# Patient Record
Sex: FEMALE | Race: WHITE | NOT HISPANIC OR LATINO
[De-identification: names, ages, dates, MRNs, and addresses within clinical notes are randomized per-mention and may not be internally consistent; named-entity substitution may affect disease eponyms.]

---

## 2017-05-15 ENCOUNTER — APPOINTMENT (OUTPATIENT)
Dept: OBGYN | Facility: CLINIC | Age: 37
End: 2017-05-15

## 2017-05-15 VITALS
HEIGHT: 67 IN | WEIGHT: 172 LBS | DIASTOLIC BLOOD PRESSURE: 64 MMHG | HEART RATE: 73 BPM | BODY MASS INDEX: 27 KG/M2 | SYSTOLIC BLOOD PRESSURE: 118 MMHG

## 2017-05-15 DIAGNOSIS — Z80.51 FAMILY HISTORY OF MALIGNANT NEOPLASM OF KIDNEY: ICD-10-CM

## 2017-05-24 ENCOUNTER — TRANSCRIPTION ENCOUNTER (OUTPATIENT)
Age: 37
End: 2017-05-24

## 2017-06-19 ENCOUNTER — APPOINTMENT (OUTPATIENT)
Dept: INTERNAL MEDICINE | Facility: CLINIC | Age: 37
End: 2017-06-19

## 2017-06-19 VITALS
HEIGHT: 67 IN | BODY MASS INDEX: 26.68 KG/M2 | HEART RATE: 72 BPM | RESPIRATION RATE: 12 BRPM | SYSTOLIC BLOOD PRESSURE: 91 MMHG | OXYGEN SATURATION: 98 % | DIASTOLIC BLOOD PRESSURE: 60 MMHG | WEIGHT: 170 LBS

## 2017-06-24 ENCOUNTER — EMERGENCY (EMERGENCY)
Facility: HOSPITAL | Age: 37
LOS: 1 days | Discharge: HOME | End: 2017-06-24
Attending: EMERGENCY MEDICINE | Admitting: EMERGENCY MEDICINE
Payer: COMMERCIAL

## 2017-06-24 VITALS
HEIGHT: 67 IN | WEIGHT: 167.99 LBS | SYSTOLIC BLOOD PRESSURE: 136 MMHG | OXYGEN SATURATION: 97 % | HEART RATE: 74 BPM | TEMPERATURE: 98 F | RESPIRATION RATE: 17 BRPM | DIASTOLIC BLOOD PRESSURE: 84 MMHG

## 2017-06-24 VITALS
OXYGEN SATURATION: 98 % | RESPIRATION RATE: 16 BRPM | TEMPERATURE: 100 F | HEART RATE: 68 BPM | SYSTOLIC BLOOD PRESSURE: 108 MMHG | DIASTOLIC BLOOD PRESSURE: 68 MMHG

## 2017-06-24 DIAGNOSIS — M67.922 UNSPECIFIED DISORDER OF SYNOVIUM AND TENDON, LEFT UPPER ARM: Chronic | ICD-10-CM

## 2017-06-24 LAB
ALBUMIN SERPL ELPH-MCNC: 3.6 G/DL — SIGNIFICANT CHANGE UP (ref 3.3–5)
ALP SERPL-CCNC: 42 U/L — SIGNIFICANT CHANGE UP (ref 30–120)
ALT FLD-CCNC: 15 U/L DA — SIGNIFICANT CHANGE UP (ref 10–60)
ANION GAP SERPL CALC-SCNC: 6 MMOL/L — SIGNIFICANT CHANGE UP (ref 5–17)
AST SERPL-CCNC: 14 U/L — SIGNIFICANT CHANGE UP (ref 10–40)
BASOPHILS # BLD AUTO: 0.1 K/UL — SIGNIFICANT CHANGE UP (ref 0–0.2)
BASOPHILS NFR BLD AUTO: 1.1 % — SIGNIFICANT CHANGE UP (ref 0–2)
BILIRUB SERPL-MCNC: 0.3 MG/DL — SIGNIFICANT CHANGE UP (ref 0.2–1.2)
BUN SERPL-MCNC: 15 MG/DL — SIGNIFICANT CHANGE UP (ref 7–23)
CALCIUM SERPL-MCNC: 9.1 MG/DL — SIGNIFICANT CHANGE UP (ref 8.4–10.5)
CHLORIDE SERPL-SCNC: 105 MMOL/L — SIGNIFICANT CHANGE UP (ref 96–108)
CK SERPL-CCNC: 27 U/L — SIGNIFICANT CHANGE UP (ref 26–192)
CO2 SERPL-SCNC: 27 MMOL/L — SIGNIFICANT CHANGE UP (ref 22–31)
CREAT SERPL-MCNC: 0.58 MG/DL — SIGNIFICANT CHANGE UP (ref 0.5–1.3)
EOSINOPHIL # BLD AUTO: 0.1 K/UL — SIGNIFICANT CHANGE UP (ref 0–0.5)
EOSINOPHIL NFR BLD AUTO: 0.7 % — SIGNIFICANT CHANGE UP (ref 0–6)
GLUCOSE SERPL-MCNC: 93 MG/DL — SIGNIFICANT CHANGE UP (ref 70–99)
HCT VFR BLD CALC: 37.7 % — SIGNIFICANT CHANGE UP (ref 34.5–45)
HGB BLD-MCNC: 12.3 G/DL — SIGNIFICANT CHANGE UP (ref 11.5–15.5)
LYMPHOCYTES # BLD AUTO: 1.9 K/UL — SIGNIFICANT CHANGE UP (ref 1–3.3)
LYMPHOCYTES # BLD AUTO: 20.6 % — SIGNIFICANT CHANGE UP (ref 13–44)
MCHC RBC-ENTMCNC: 29.3 PG — SIGNIFICANT CHANGE UP (ref 27–34)
MCHC RBC-ENTMCNC: 32.5 GM/DL — SIGNIFICANT CHANGE UP (ref 32–36)
MCV RBC AUTO: 90 FL — SIGNIFICANT CHANGE UP (ref 80–100)
MONOCYTES # BLD AUTO: 0.5 K/UL — SIGNIFICANT CHANGE UP (ref 0–0.9)
MONOCYTES NFR BLD AUTO: 5.7 % — SIGNIFICANT CHANGE UP (ref 2–14)
NEUTROPHILS # BLD AUTO: 6.8 K/UL — SIGNIFICANT CHANGE UP (ref 1.8–7.4)
NEUTROPHILS NFR BLD AUTO: 72 % — SIGNIFICANT CHANGE UP (ref 43–77)
PLATELET # BLD AUTO: 242 K/UL — SIGNIFICANT CHANGE UP (ref 150–400)
POTASSIUM SERPL-MCNC: 3.9 MMOL/L — SIGNIFICANT CHANGE UP (ref 3.5–5.3)
POTASSIUM SERPL-SCNC: 3.9 MMOL/L — SIGNIFICANT CHANGE UP (ref 3.5–5.3)
PROT SERPL-MCNC: 8 G/DL — SIGNIFICANT CHANGE UP (ref 6–8.3)
RBC # BLD: 4.19 M/UL — SIGNIFICANT CHANGE UP (ref 3.8–5.2)
RBC # FLD: 11.8 % — SIGNIFICANT CHANGE UP (ref 10.3–14.5)
SODIUM SERPL-SCNC: 138 MMOL/L — SIGNIFICANT CHANGE UP (ref 135–145)
TROPONIN I SERPL-MCNC: 0 NG/ML — LOW (ref 0.02–0.06)
WBC # BLD: 9.4 K/UL — SIGNIFICANT CHANGE UP (ref 3.8–10.5)
WBC # FLD AUTO: 9.4 K/UL — SIGNIFICANT CHANGE UP (ref 3.8–10.5)

## 2017-06-24 PROCEDURE — 93010 ELECTROCARDIOGRAM REPORT: CPT

## 2017-06-24 PROCEDURE — 99284 EMERGENCY DEPT VISIT MOD MDM: CPT | Mod: 25

## 2017-06-24 PROCEDURE — 36415 COLL VENOUS BLD VENIPUNCTURE: CPT

## 2017-06-24 PROCEDURE — 93005 ELECTROCARDIOGRAM TRACING: CPT

## 2017-06-24 PROCEDURE — 84484 ASSAY OF TROPONIN QUANT: CPT

## 2017-06-24 PROCEDURE — 85027 COMPLETE CBC AUTOMATED: CPT

## 2017-06-24 PROCEDURE — 99284 EMERGENCY DEPT VISIT MOD MDM: CPT

## 2017-06-24 PROCEDURE — 82550 ASSAY OF CK (CPK): CPT

## 2017-06-24 PROCEDURE — 80053 COMPREHEN METABOLIC PANEL: CPT

## 2017-06-24 RX ORDER — MECLIZINE HCL 12.5 MG
1 TABLET ORAL
Qty: 15 | Refills: 0 | OUTPATIENT
Start: 2017-06-24 | End: 2017-06-29

## 2017-06-24 RX ORDER — SODIUM CHLORIDE 9 MG/ML
3 INJECTION INTRAMUSCULAR; INTRAVENOUS; SUBCUTANEOUS ONCE
Qty: 0 | Refills: 0 | Status: COMPLETED | OUTPATIENT
Start: 2017-06-24 | End: 2017-06-24

## 2017-06-24 RX ORDER — MECLIZINE HCL 12.5 MG
25 TABLET ORAL ONCE
Qty: 0 | Refills: 0 | Status: COMPLETED | OUTPATIENT
Start: 2017-06-24 | End: 2017-06-24

## 2017-06-24 RX ORDER — SODIUM CHLORIDE 9 MG/ML
1000 INJECTION INTRAMUSCULAR; INTRAVENOUS; SUBCUTANEOUS ONCE
Qty: 0 | Refills: 0 | Status: COMPLETED | OUTPATIENT
Start: 2017-06-24 | End: 2017-06-24

## 2017-06-24 RX ADMIN — SODIUM CHLORIDE 3 MILLILITER(S): 9 INJECTION INTRAMUSCULAR; INTRAVENOUS; SUBCUTANEOUS at 14:28

## 2017-06-24 RX ADMIN — Medication 25 MILLIGRAM(S): at 14:29

## 2017-06-24 RX ADMIN — SODIUM CHLORIDE 250 MILLILITER(S): 9 INJECTION INTRAMUSCULAR; INTRAVENOUS; SUBCUTANEOUS at 14:29

## 2017-06-24 NOTE — ED PROVIDER NOTE - OBJECTIVE STATEMENT
35 y/o F presents to the ED c/o "heart beating hard" and dizziness today. Pt states that she was at work when she suddenly felt her heart "beating harder than usual" and experienced dizziness. Pt tried to rest, thinking that the symptoms would resolve on their own. However, when she sat up, the sx worsened. Pt also notes that before she arrived to the ED, she experienced tingling in her fingertips and her lips. Pt notes that a couple of years ago, she experienced a similar episode. She notes that today's episode lasted a little longer in length than the previous episode years ago.  Pt denies CP or HA. Nonsmoker. Social ETOH usage. Allergic to penicillin, sulfur and Zithromax. PSHx: ablation for SVT (8 years ago)

## 2017-06-24 NOTE — ED ADULT NURSE NOTE - CHPI ED SYMPTOMS NEG
no tingling/no pain/no vomiting/no chills/no numbness/no fever/no decreased eating/drinking/no nausea

## 2017-06-24 NOTE — ED PROVIDER NOTE - MEDICAL DECISION MAKING DETAILS
Will evaluate the etiology of pt's sudden onset of dizziness and racing heart and correlate with physical exam, lab tests and imaging studies. Will treat accordingly.

## 2017-06-24 NOTE — ED ADULT NURSE NOTE - OBJECTIVE STATEMENT
Pt states while at work she was in a hot environment felt headace and felt she was going to pass out. States she "got herself to a good place" with no LOC. Alert oriented x 3 Skin warm and dry to touch. Color good JOSE CM RSR with no ectopics noted

## 2018-01-08 ENCOUNTER — ASOB RESULT (OUTPATIENT)
Age: 38
End: 2018-01-08

## 2018-01-08 ENCOUNTER — APPOINTMENT (OUTPATIENT)
Dept: OBGYN | Facility: CLINIC | Age: 38
End: 2018-01-08
Payer: COMMERCIAL

## 2018-01-08 VITALS
BODY MASS INDEX: 27.15 KG/M2 | DIASTOLIC BLOOD PRESSURE: 78 MMHG | WEIGHT: 173 LBS | HEIGHT: 67 IN | HEART RATE: 66 BPM | SYSTOLIC BLOOD PRESSURE: 127 MMHG

## 2018-01-08 DIAGNOSIS — Z78.9 OTHER SPECIFIED HEALTH STATUS: ICD-10-CM

## 2018-01-08 DIAGNOSIS — Z31.69 ENCOUNTER FOR OTHER GENERAL COUNSELING AND ADVICE ON PROCREATION: ICD-10-CM

## 2018-01-08 DIAGNOSIS — A09 INFECTIOUS GASTROENTERITIS AND COLITIS, UNSPECIFIED: ICD-10-CM

## 2018-01-08 DIAGNOSIS — Z30.41 ENCOUNTER FOR SURVEILLANCE OF CONTRACEPTIVE PILLS: ICD-10-CM

## 2018-01-08 PROCEDURE — 76817 TRANSVAGINAL US OBSTETRIC: CPT

## 2018-01-08 PROCEDURE — 99214 OFFICE O/P EST MOD 30 MIN: CPT

## 2018-01-08 RX ORDER — DESOGESTREL/ETHINYL ESTRADIOL AND ETHINYL ESTRADIOL 21-5 (28)
0.15-0.02/0.01 KIT ORAL
Qty: 28 | Refills: 6 | Status: COMPLETED | COMMUNITY
Start: 2017-05-15 | End: 2018-01-08

## 2018-01-22 ENCOUNTER — ASOB RESULT (OUTPATIENT)
Age: 38
End: 2018-01-22

## 2018-01-22 ENCOUNTER — TRANSCRIPTION ENCOUNTER (OUTPATIENT)
Age: 38
End: 2018-01-22

## 2018-01-22 ENCOUNTER — APPOINTMENT (OUTPATIENT)
Dept: OBGYN | Facility: CLINIC | Age: 38
End: 2018-01-22
Payer: COMMERCIAL

## 2018-01-22 PROCEDURE — 76817 TRANSVAGINAL US OBSTETRIC: CPT

## 2018-02-04 PROBLEM — Z78.9 NON-SMOKER: Status: ACTIVE | Noted: 2018-02-04

## 2018-02-04 PROBLEM — Z31.69 PRE-CONCEPTION COUNSELING: Status: RESOLVED | Noted: 2017-05-15 | Resolved: 2018-02-04

## 2018-02-05 ENCOUNTER — APPOINTMENT (OUTPATIENT)
Dept: OBGYN | Facility: CLINIC | Age: 38
End: 2018-02-05
Payer: COMMERCIAL

## 2018-02-05 ENCOUNTER — NON-APPOINTMENT (OUTPATIENT)
Age: 38
End: 2018-02-05

## 2018-02-05 ENCOUNTER — RESULT REVIEW (OUTPATIENT)
Age: 38
End: 2018-02-05

## 2018-02-05 ENCOUNTER — TRANSCRIPTION ENCOUNTER (OUTPATIENT)
Age: 38
End: 2018-02-05

## 2018-02-05 VITALS
DIASTOLIC BLOOD PRESSURE: 70 MMHG | WEIGHT: 175 LBS | BODY MASS INDEX: 27.47 KG/M2 | HEIGHT: 67 IN | SYSTOLIC BLOOD PRESSURE: 119 MMHG

## 2018-02-05 LAB
ABO + RH PNL BLD: NORMAL
BACTERIA UR CULT: NORMAL
BASOPHILS # BLD AUTO: 0.04 K/UL
BASOPHILS NFR BLD AUTO: 0.3 %
BLD GP AB SCN SERPL QL: NORMAL
C TRACH RRNA SPEC QL NAA+PROBE: NOT DETECTED
CYTOLOGY CVX/VAG DOC THIN PREP: NORMAL
EOSINOPHIL # BLD AUTO: 0.08 K/UL
EOSINOPHIL NFR BLD AUTO: 0.6 %
HBV SURFACE AG SER QL: NONREACTIVE
HCT VFR BLD CALC: 37.4 %
HCV AB SER QL: NONREACTIVE
HCV S/CO RATIO: 0.15 S/CO
HGB BLD-MCNC: 12.5 G/DL
HIV1+2 AB SPEC QL IA.RAPID: NONREACTIVE
HPV HIGH+LOW RISK DNA PNL CVX: NOT DETECTED
IMM GRANULOCYTES NFR BLD AUTO: 0.2 %
LYMPHOCYTES # BLD AUTO: 1.75 K/UL
LYMPHOCYTES NFR BLD AUTO: 13 %
MAN DIFF?: NORMAL
MCHC RBC-ENTMCNC: 29.4 PG
MCHC RBC-ENTMCNC: 33.4 GM/DL
MCV RBC AUTO: 88 FL
MONOCYTES # BLD AUTO: 0.79 K/UL
MONOCYTES NFR BLD AUTO: 5.9 %
N GONORRHOEA RRNA SPEC QL NAA+PROBE: NOT DETECTED
NEUTROPHILS # BLD AUTO: 10.76 K/UL
NEUTROPHILS NFR BLD AUTO: 80 %
PLATELET # BLD AUTO: 301 K/UL
RBC # BLD: 4.25 M/UL
RBC # FLD: 12.9 %
RUBV IGG FLD-ACNC: 0.7 INDEX
RUBV IGG SER-IMP: NEGATIVE
SOURCE AMPLIFICATION: NORMAL
T GONDII AB SER-IMP: NEGATIVE
T GONDII AB SER-IMP: NEGATIVE
T GONDII IGG SER QL: <3 IU/ML
T GONDII IGM SER QL: <3 AU/ML
VZV AB TITR SER: NEGATIVE
VZV IGG SER IF-ACNC: 97.9 INDEX
WBC # FLD AUTO: 13.45 K/UL

## 2018-02-05 PROCEDURE — 0501F PRENATAL FLOW SHEET: CPT

## 2018-02-06 LAB
B19V IGG SER QL IA: 6.7 INDEX
B19V IGG+IGM SER-IMP: NORMAL
B19V IGG+IGM SER-IMP: POSITIVE
B19V IGM FLD-ACNC: 0.2 INDEX
B19V IGM SER-ACNC: NEGATIVE
BACTERIA UR CULT: NORMAL
LEAD BLD-MCNC: <1 UG/DL
T PALLIDUM AB SER QL IA: NEGATIVE

## 2018-02-08 ENCOUNTER — OTHER (OUTPATIENT)
Age: 38
End: 2018-02-08

## 2018-02-09 LAB
FMR1 GENE MUT ANL BLD/T: NORMAL
HGB A MFR BLD: 97.3 %
HGB A2 MFR BLD: 2.7 %
HGB FRACT BLD-IMP: NORMAL

## 2018-02-21 ENCOUNTER — ASOB RESULT (OUTPATIENT)
Age: 38
End: 2018-02-21

## 2018-02-21 ENCOUNTER — LABORATORY RESULT (OUTPATIENT)
Age: 38
End: 2018-02-21

## 2018-02-21 ENCOUNTER — APPOINTMENT (OUTPATIENT)
Dept: ANTEPARTUM | Facility: CLINIC | Age: 38
End: 2018-02-21
Payer: COMMERCIAL

## 2018-02-21 LAB — CFTR MUT TESTED BLD/T: NORMAL

## 2018-02-21 PROCEDURE — 76813 OB US NUCHAL MEAS 1 GEST: CPT

## 2018-02-21 PROCEDURE — 76801 OB US < 14 WKS SINGLE FETUS: CPT

## 2018-02-21 PROCEDURE — 36416 COLLJ CAPILLARY BLOOD SPEC: CPT

## 2018-03-12 ENCOUNTER — NON-APPOINTMENT (OUTPATIENT)
Age: 38
End: 2018-03-12

## 2018-03-12 ENCOUNTER — APPOINTMENT (OUTPATIENT)
Dept: OBGYN | Facility: CLINIC | Age: 38
End: 2018-03-12
Payer: COMMERCIAL

## 2018-03-12 VITALS
WEIGHT: 175.31 LBS | HEIGHT: 67 IN | SYSTOLIC BLOOD PRESSURE: 117 MMHG | BODY MASS INDEX: 27.52 KG/M2 | DIASTOLIC BLOOD PRESSURE: 72 MMHG

## 2018-03-12 PROCEDURE — 0502F SUBSEQUENT PRENATAL CARE: CPT

## 2018-04-09 ENCOUNTER — NON-APPOINTMENT (OUTPATIENT)
Age: 38
End: 2018-04-09

## 2018-04-09 ENCOUNTER — LABORATORY RESULT (OUTPATIENT)
Age: 38
End: 2018-04-09

## 2018-04-09 ENCOUNTER — APPOINTMENT (OUTPATIENT)
Dept: OBGYN | Facility: CLINIC | Age: 38
End: 2018-04-09
Payer: COMMERCIAL

## 2018-04-09 VITALS
SYSTOLIC BLOOD PRESSURE: 124 MMHG | WEIGHT: 180 LBS | DIASTOLIC BLOOD PRESSURE: 74 MMHG | BODY MASS INDEX: 28.25 KG/M2 | HEIGHT: 67 IN

## 2018-04-09 PROCEDURE — 0502F SUBSEQUENT PRENATAL CARE: CPT

## 2018-04-16 ENCOUNTER — ASOB RESULT (OUTPATIENT)
Age: 38
End: 2018-04-16

## 2018-04-16 ENCOUNTER — APPOINTMENT (OUTPATIENT)
Dept: ANTEPARTUM | Facility: CLINIC | Age: 38
End: 2018-04-16
Payer: COMMERCIAL

## 2018-04-16 PROCEDURE — 76811 OB US DETAILED SNGL FETUS: CPT

## 2018-05-06 ENCOUNTER — OUTPATIENT (OUTPATIENT)
Dept: OUTPATIENT SERVICES | Facility: HOSPITAL | Age: 38
LOS: 1 days | End: 2018-05-06
Payer: COMMERCIAL

## 2018-05-06 DIAGNOSIS — O26.899 OTHER SPECIFIED PREGNANCY RELATED CONDITIONS, UNSPECIFIED TRIMESTER: ICD-10-CM

## 2018-05-06 DIAGNOSIS — Z3A.00 WEEKS OF GESTATION OF PREGNANCY NOT SPECIFIED: ICD-10-CM

## 2018-05-06 DIAGNOSIS — M67.922 UNSPECIFIED DISORDER OF SYNOVIUM AND TENDON, LEFT UPPER ARM: Chronic | ICD-10-CM

## 2018-05-06 LAB
ALBUMIN SERPL ELPH-MCNC: 3.3 G/DL — SIGNIFICANT CHANGE UP (ref 3.3–5)
ALP SERPL-CCNC: 46 U/L — SIGNIFICANT CHANGE UP (ref 40–120)
ALT FLD-CCNC: 25 U/L — SIGNIFICANT CHANGE UP (ref 4–33)
APPEARANCE UR: CLEAR — SIGNIFICANT CHANGE UP
AST SERPL-CCNC: 18 U/L — SIGNIFICANT CHANGE UP (ref 4–32)
BASOPHILS # BLD AUTO: 0.02 K/UL — SIGNIFICANT CHANGE UP (ref 0–0.2)
BASOPHILS NFR BLD AUTO: 0.2 % — SIGNIFICANT CHANGE UP (ref 0–2)
BILIRUB SERPL-MCNC: 0.3 MG/DL — SIGNIFICANT CHANGE UP (ref 0.2–1.2)
BILIRUB UR-MCNC: NEGATIVE — SIGNIFICANT CHANGE UP
BLOOD UR QL VISUAL: NEGATIVE — SIGNIFICANT CHANGE UP
BUN SERPL-MCNC: 7 MG/DL — SIGNIFICANT CHANGE UP (ref 7–23)
CALCIUM SERPL-MCNC: 8.1 MG/DL — LOW (ref 8.4–10.5)
CHLORIDE SERPL-SCNC: 101 MMOL/L — SIGNIFICANT CHANGE UP (ref 98–107)
CO2 SERPL-SCNC: 18 MMOL/L — LOW (ref 22–31)
COLOR SPEC: YELLOW — SIGNIFICANT CHANGE UP
CREAT SERPL-MCNC: 0.51 MG/DL — SIGNIFICANT CHANGE UP (ref 0.5–1.3)
EOSINOPHIL # BLD AUTO: 0.01 K/UL — SIGNIFICANT CHANGE UP (ref 0–0.5)
EOSINOPHIL NFR BLD AUTO: 0.1 % — SIGNIFICANT CHANGE UP (ref 0–6)
GLUCOSE SERPL-MCNC: 108 MG/DL — HIGH (ref 70–99)
GLUCOSE UR-MCNC: NEGATIVE — SIGNIFICANT CHANGE UP
HCT VFR BLD CALC: 33.1 % — LOW (ref 34.5–45)
HGB BLD-MCNC: 10.9 G/DL — LOW (ref 11.5–15.5)
IMM GRANULOCYTES # BLD AUTO: 0.03 # — SIGNIFICANT CHANGE UP
IMM GRANULOCYTES NFR BLD AUTO: 0.3 % — SIGNIFICANT CHANGE UP (ref 0–1.5)
KETONES UR-MCNC: NEGATIVE — SIGNIFICANT CHANGE UP
LEUKOCYTE ESTERASE UR-ACNC: NEGATIVE — SIGNIFICANT CHANGE UP
LYMPHOCYTES # BLD AUTO: 0.48 K/UL — LOW (ref 1–3.3)
LYMPHOCYTES # BLD AUTO: 4.2 % — LOW (ref 13–44)
MCHC RBC-ENTMCNC: 30.1 PG — SIGNIFICANT CHANGE UP (ref 27–34)
MCHC RBC-ENTMCNC: 32.9 % — SIGNIFICANT CHANGE UP (ref 32–36)
MCV RBC AUTO: 91.4 FL — SIGNIFICANT CHANGE UP (ref 80–100)
MONOCYTES # BLD AUTO: 0.29 K/UL — SIGNIFICANT CHANGE UP (ref 0–0.9)
MONOCYTES NFR BLD AUTO: 2.5 % — SIGNIFICANT CHANGE UP (ref 2–14)
MUCOUS THREADS # UR AUTO: SIGNIFICANT CHANGE UP
NEUTROPHILS # BLD AUTO: 10.72 K/UL — HIGH (ref 1.8–7.4)
NEUTROPHILS NFR BLD AUTO: 92.7 % — HIGH (ref 43–77)
NITRITE UR-MCNC: NEGATIVE — SIGNIFICANT CHANGE UP
NON-SQ EPI CELLS # UR AUTO: <1 — SIGNIFICANT CHANGE UP
NRBC # FLD: 0 — SIGNIFICANT CHANGE UP
PH UR: 6 — SIGNIFICANT CHANGE UP (ref 4.6–8)
PLATELET # BLD AUTO: 187 K/UL — SIGNIFICANT CHANGE UP (ref 150–400)
PMV BLD: 11.3 FL — SIGNIFICANT CHANGE UP (ref 7–13)
POTASSIUM SERPL-MCNC: 3.7 MMOL/L — SIGNIFICANT CHANGE UP (ref 3.5–5.3)
POTASSIUM SERPL-SCNC: 3.7 MMOL/L — SIGNIFICANT CHANGE UP (ref 3.5–5.3)
PROT SERPL-MCNC: 6.6 G/DL — SIGNIFICANT CHANGE UP (ref 6–8.3)
PROT UR-MCNC: 10 MG/DL — SIGNIFICANT CHANGE UP
RBC # BLD: 3.62 M/UL — LOW (ref 3.8–5.2)
RBC # FLD: 13.4 % — SIGNIFICANT CHANGE UP (ref 10.3–14.5)
RBC CASTS # UR COMP ASSIST: SIGNIFICANT CHANGE UP (ref 0–?)
SODIUM SERPL-SCNC: 133 MMOL/L — LOW (ref 135–145)
SP GR SPEC: 1.02 — SIGNIFICANT CHANGE UP (ref 1–1.04)
SQUAMOUS # UR AUTO: SIGNIFICANT CHANGE UP
UROBILINOGEN FLD QL: NORMAL MG/DL — SIGNIFICANT CHANGE UP
WBC # BLD: 11.55 K/UL — HIGH (ref 3.8–10.5)
WBC # FLD AUTO: 11.55 K/UL — HIGH (ref 3.8–10.5)
WBC UR QL: SIGNIFICANT CHANGE UP (ref 0–?)

## 2018-05-06 PROCEDURE — 76815 OB US LIMITED FETUS(S): CPT | Mod: 26

## 2018-05-06 PROCEDURE — 76817 TRANSVAGINAL US OBSTETRIC: CPT | Mod: 26

## 2018-05-06 PROCEDURE — 99202 OFFICE O/P NEW SF 15 MIN: CPT | Mod: 25

## 2018-05-06 RX ORDER — SODIUM CHLORIDE 9 MG/ML
1000 INJECTION, SOLUTION INTRAVENOUS ONCE
Qty: 0 | Refills: 0 | Status: COMPLETED | OUTPATIENT
Start: 2018-05-06 | End: 2018-05-06

## 2018-05-06 RX ADMIN — SODIUM CHLORIDE 2000 MILLILITER(S): 9 INJECTION, SOLUTION INTRAVENOUS at 16:20

## 2018-05-07 ENCOUNTER — NON-APPOINTMENT (OUTPATIENT)
Age: 38
End: 2018-05-07

## 2018-05-07 ENCOUNTER — APPOINTMENT (OUTPATIENT)
Dept: OBGYN | Facility: CLINIC | Age: 38
End: 2018-05-07
Payer: COMMERCIAL

## 2018-05-07 VITALS
SYSTOLIC BLOOD PRESSURE: 107 MMHG | HEIGHT: 67 IN | DIASTOLIC BLOOD PRESSURE: 72 MMHG | WEIGHT: 186.25 LBS | BODY MASS INDEX: 29.23 KG/M2

## 2018-05-07 VITALS — DIASTOLIC BLOOD PRESSURE: 71 MMHG | SYSTOLIC BLOOD PRESSURE: 118 MMHG

## 2018-05-07 VITALS — DIASTOLIC BLOOD PRESSURE: 73 MMHG | SYSTOLIC BLOOD PRESSURE: 68 MMHG

## 2018-05-07 VITALS — DIASTOLIC BLOOD PRESSURE: 66 MMHG | SYSTOLIC BLOOD PRESSURE: 98 MMHG

## 2018-05-07 VITALS — TEMPERATURE: 98.1 F

## 2018-05-07 PROCEDURE — 0502F SUBSEQUENT PRENATAL CARE: CPT

## 2018-05-08 LAB — SPECIMEN SOURCE: SIGNIFICANT CHANGE UP

## 2018-05-09 LAB
-  AMIKACIN: SIGNIFICANT CHANGE UP
-  AMPICILLIN/SULBACTAM: SIGNIFICANT CHANGE UP
-  AMPICILLIN: SIGNIFICANT CHANGE UP
-  AZTREONAM: SIGNIFICANT CHANGE UP
-  CEFAZOLIN: SIGNIFICANT CHANGE UP
-  CEFEPIME: SIGNIFICANT CHANGE UP
-  CEFOXITIN: SIGNIFICANT CHANGE UP
-  CEFTAZIDIME: SIGNIFICANT CHANGE UP
-  CEFTRIAXONE: SIGNIFICANT CHANGE UP
-  CIPROFLOXACIN: SIGNIFICANT CHANGE UP
-  ERTAPENEM: SIGNIFICANT CHANGE UP
-  GENTAMICIN: SIGNIFICANT CHANGE UP
-  IMIPENEM: SIGNIFICANT CHANGE UP
-  LEVOFLOXACIN: SIGNIFICANT CHANGE UP
-  MEROPENEM: SIGNIFICANT CHANGE UP
-  NITROFURANTOIN: SIGNIFICANT CHANGE UP
-  PIPERACILLIN/TAZOBACTAM: SIGNIFICANT CHANGE UP
-  TIGECYCLINE: SIGNIFICANT CHANGE UP
-  TOBRAMYCIN: SIGNIFICANT CHANGE UP
-  TRIMETHOPRIM/SULFAMETHOXAZOLE: SIGNIFICANT CHANGE UP
BACTERIA UR CULT: SIGNIFICANT CHANGE UP
METHOD TYPE: SIGNIFICANT CHANGE UP
ORGANISM # SPEC MICROSCOPIC CNT: SIGNIFICANT CHANGE UP
ORGANISM # SPEC MICROSCOPIC CNT: SIGNIFICANT CHANGE UP

## 2018-05-14 ENCOUNTER — OTHER (OUTPATIENT)
Age: 38
End: 2018-05-14

## 2018-05-19 ENCOUNTER — OUTPATIENT (OUTPATIENT)
Dept: OUTPATIENT SERVICES | Facility: HOSPITAL | Age: 38
LOS: 1 days | End: 2018-05-19

## 2018-05-19 DIAGNOSIS — O26.899 OTHER SPECIFIED PREGNANCY RELATED CONDITIONS, UNSPECIFIED TRIMESTER: ICD-10-CM

## 2018-05-19 DIAGNOSIS — M67.922 UNSPECIFIED DISORDER OF SYNOVIUM AND TENDON, LEFT UPPER ARM: Chronic | ICD-10-CM

## 2018-05-19 DIAGNOSIS — Z3A.00 WEEKS OF GESTATION OF PREGNANCY NOT SPECIFIED: ICD-10-CM

## 2018-06-01 ENCOUNTER — RECORD ABSTRACTING (OUTPATIENT)
Age: 38
End: 2018-06-01

## 2018-06-04 ENCOUNTER — APPOINTMENT (OUTPATIENT)
Dept: OBGYN | Facility: CLINIC | Age: 38
End: 2018-06-04
Payer: COMMERCIAL

## 2018-06-04 VITALS
SYSTOLIC BLOOD PRESSURE: 112 MMHG | DIASTOLIC BLOOD PRESSURE: 76 MMHG | WEIGHT: 191 LBS | HEIGHT: 67 IN | BODY MASS INDEX: 29.98 KG/M2

## 2018-06-04 PROCEDURE — 76815 OB US LIMITED FETUS(S): CPT

## 2018-06-04 PROCEDURE — 0502F SUBSEQUENT PRENATAL CARE: CPT

## 2018-06-05 ENCOUNTER — NON-APPOINTMENT (OUTPATIENT)
Age: 38
End: 2018-06-05

## 2018-06-05 LAB
ABO + RH PNL BLD: NORMAL
BASOPHILS # BLD AUTO: 0.02 K/UL
BASOPHILS NFR BLD AUTO: 0.2 %
BLD GP AB SCN SERPL QL: NORMAL
EOSINOPHIL # BLD AUTO: 0.09 K/UL
EOSINOPHIL NFR BLD AUTO: 0.9 %
GLUCOSE 1H P 50 G GLC PO SERPL-MCNC: 134 MG/DL
HCT VFR BLD CALC: 31 %
HGB BLD-MCNC: 10.4 G/DL
IMM GRANULOCYTES NFR BLD AUTO: 0.3 %
LYMPHOCYTES # BLD AUTO: 1.42 K/UL
LYMPHOCYTES NFR BLD AUTO: 14.2 %
MAN DIFF?: NORMAL
MCHC RBC-ENTMCNC: 30.1 PG
MCHC RBC-ENTMCNC: 33.5 GM/DL
MCV RBC AUTO: 89.9 FL
MONOCYTES # BLD AUTO: 0.39 K/UL
MONOCYTES NFR BLD AUTO: 3.9 %
NEUTROPHILS # BLD AUTO: 8.07 K/UL
NEUTROPHILS NFR BLD AUTO: 80.5 %
PLATELET # BLD AUTO: 175 K/UL
RBC # BLD: 3.45 M/UL
RBC # FLD: 13.9 %
WBC # FLD AUTO: 10.02 K/UL

## 2018-06-08 ENCOUNTER — OTHER (OUTPATIENT)
Age: 38
End: 2018-06-08

## 2018-06-25 ENCOUNTER — NON-APPOINTMENT (OUTPATIENT)
Age: 38
End: 2018-06-25

## 2018-06-25 ENCOUNTER — APPOINTMENT (OUTPATIENT)
Dept: OBGYN | Facility: CLINIC | Age: 38
End: 2018-06-25
Payer: COMMERCIAL

## 2018-06-25 VITALS
DIASTOLIC BLOOD PRESSURE: 81 MMHG | SYSTOLIC BLOOD PRESSURE: 128 MMHG | BODY MASS INDEX: 30.29 KG/M2 | HEIGHT: 67 IN | WEIGHT: 193 LBS

## 2018-06-25 VITALS — HEIGHT: 67 IN

## 2018-06-25 PROCEDURE — 90715 TDAP VACCINE 7 YRS/> IM: CPT

## 2018-06-25 PROCEDURE — 90471 IMMUNIZATION ADMIN: CPT

## 2018-06-25 PROCEDURE — 0502F SUBSEQUENT PRENATAL CARE: CPT

## 2018-07-11 ENCOUNTER — APPOINTMENT (OUTPATIENT)
Dept: OBGYN | Facility: CLINIC | Age: 38
End: 2018-07-11
Payer: COMMERCIAL

## 2018-07-11 ENCOUNTER — ASOB RESULT (OUTPATIENT)
Age: 38
End: 2018-07-11

## 2018-07-11 ENCOUNTER — APPOINTMENT (OUTPATIENT)
Dept: ANTEPARTUM | Facility: CLINIC | Age: 38
End: 2018-07-11
Payer: COMMERCIAL

## 2018-07-11 ENCOUNTER — NON-APPOINTMENT (OUTPATIENT)
Age: 38
End: 2018-07-11

## 2018-07-11 VITALS
SYSTOLIC BLOOD PRESSURE: 116 MMHG | DIASTOLIC BLOOD PRESSURE: 74 MMHG | HEIGHT: 67 IN | WEIGHT: 194 LBS | BODY MASS INDEX: 30.45 KG/M2

## 2018-07-11 PROCEDURE — 0502F SUBSEQUENT PRENATAL CARE: CPT

## 2018-07-11 PROCEDURE — 76816 OB US FOLLOW-UP PER FETUS: CPT

## 2018-07-11 PROCEDURE — 76819 FETAL BIOPHYS PROFIL W/O NST: CPT

## 2018-07-23 ENCOUNTER — APPOINTMENT (OUTPATIENT)
Dept: OBGYN | Facility: CLINIC | Age: 38
End: 2018-07-23
Payer: COMMERCIAL

## 2018-07-23 ENCOUNTER — NON-APPOINTMENT (OUTPATIENT)
Age: 38
End: 2018-07-23

## 2018-07-23 VITALS
DIASTOLIC BLOOD PRESSURE: 82 MMHG | WEIGHT: 199 LBS | SYSTOLIC BLOOD PRESSURE: 126 MMHG | HEIGHT: 67 IN | BODY MASS INDEX: 31.23 KG/M2

## 2018-07-23 PROCEDURE — 0502F SUBSEQUENT PRENATAL CARE: CPT

## 2018-08-06 ENCOUNTER — NON-APPOINTMENT (OUTPATIENT)
Age: 38
End: 2018-08-06

## 2018-08-06 ENCOUNTER — APPOINTMENT (OUTPATIENT)
Dept: OBGYN | Facility: CLINIC | Age: 38
End: 2018-08-06
Payer: COMMERCIAL

## 2018-08-06 VITALS
HEIGHT: 67 IN | SYSTOLIC BLOOD PRESSURE: 119 MMHG | DIASTOLIC BLOOD PRESSURE: 80 MMHG | WEIGHT: 198 LBS | BODY MASS INDEX: 31.08 KG/M2

## 2018-08-06 PROCEDURE — 0502F SUBSEQUENT PRENATAL CARE: CPT

## 2018-08-08 LAB
GP B STREP DNA SPEC QL NAA+PROBE: NORMAL
GP B STREP DNA SPEC QL NAA+PROBE: NOT DETECTED
SOURCE GBS: NORMAL

## 2018-08-13 ENCOUNTER — APPOINTMENT (OUTPATIENT)
Dept: OBGYN | Facility: CLINIC | Age: 38
End: 2018-08-13
Payer: COMMERCIAL

## 2018-08-13 VITALS
DIASTOLIC BLOOD PRESSURE: 74 MMHG | WEIGHT: 201.06 LBS | BODY MASS INDEX: 31.56 KG/M2 | SYSTOLIC BLOOD PRESSURE: 115 MMHG | HEIGHT: 67 IN

## 2018-08-13 PROCEDURE — 0502F SUBSEQUENT PRENATAL CARE: CPT

## 2018-08-20 ENCOUNTER — APPOINTMENT (OUTPATIENT)
Dept: OBGYN | Facility: CLINIC | Age: 38
End: 2018-08-20

## 2018-08-20 VITALS
BODY MASS INDEX: 31.55 KG/M2 | WEIGHT: 201 LBS | DIASTOLIC BLOOD PRESSURE: 93 MMHG | HEIGHT: 67 IN | SYSTOLIC BLOOD PRESSURE: 133 MMHG

## 2018-08-20 VITALS — SYSTOLIC BLOOD PRESSURE: 108 MMHG | DIASTOLIC BLOOD PRESSURE: 75 MMHG

## 2018-08-27 ENCOUNTER — NON-APPOINTMENT (OUTPATIENT)
Age: 38
End: 2018-08-27

## 2018-08-27 ENCOUNTER — APPOINTMENT (OUTPATIENT)
Dept: OBGYN | Facility: CLINIC | Age: 38
End: 2018-08-27
Payer: COMMERCIAL

## 2018-08-27 VITALS
SYSTOLIC BLOOD PRESSURE: 118 MMHG | DIASTOLIC BLOOD PRESSURE: 75 MMHG | HEIGHT: 67 IN | BODY MASS INDEX: 31.52 KG/M2 | WEIGHT: 200.8 LBS

## 2018-08-27 PROCEDURE — 0502F SUBSEQUENT PRENATAL CARE: CPT

## 2018-08-29 PROBLEM — R55 SYNCOPE AND COLLAPSE: Chronic | Status: ACTIVE | Noted: 2017-06-24

## 2018-08-29 PROBLEM — I49.9 CARDIAC ARRHYTHMIA, UNSPECIFIED: Chronic | Status: ACTIVE | Noted: 2017-06-24

## 2018-09-04 ENCOUNTER — ASOB RESULT (OUTPATIENT)
Age: 38
End: 2018-09-04

## 2018-09-04 ENCOUNTER — APPOINTMENT (OUTPATIENT)
Dept: ANTEPARTUM | Facility: HOSPITAL | Age: 38
End: 2018-09-04

## 2018-09-04 ENCOUNTER — APPOINTMENT (OUTPATIENT)
Dept: ANTEPARTUM | Facility: CLINIC | Age: 38
End: 2018-09-04
Payer: COMMERCIAL

## 2018-09-04 PROCEDURE — 76816 OB US FOLLOW-UP PER FETUS: CPT

## 2018-09-04 PROCEDURE — 76818 FETAL BIOPHYS PROFILE W/NST: CPT | Mod: 26

## 2018-09-06 ENCOUNTER — APPOINTMENT (OUTPATIENT)
Dept: OBGYN | Facility: CLINIC | Age: 38
End: 2018-09-06
Payer: COMMERCIAL

## 2018-09-06 ENCOUNTER — ASOB RESULT (OUTPATIENT)
Age: 38
End: 2018-09-06

## 2018-09-06 ENCOUNTER — APPOINTMENT (OUTPATIENT)
Dept: ANTEPARTUM | Facility: HOSPITAL | Age: 38
End: 2018-09-06
Payer: COMMERCIAL

## 2018-09-06 VITALS
SYSTOLIC BLOOD PRESSURE: 120 MMHG | HEIGHT: 67 IN | BODY MASS INDEX: 32.05 KG/M2 | WEIGHT: 204.19 LBS | DIASTOLIC BLOOD PRESSURE: 78 MMHG

## 2018-09-06 PROCEDURE — 0502F SUBSEQUENT PRENATAL CARE: CPT

## 2018-09-06 PROCEDURE — 59025 FETAL NON-STRESS TEST: CPT | Mod: 26

## 2018-09-08 ENCOUNTER — NON-APPOINTMENT (OUTPATIENT)
Age: 38
End: 2018-09-08

## 2018-09-08 ENCOUNTER — INPATIENT (INPATIENT)
Facility: HOSPITAL | Age: 38
LOS: 9 days | Discharge: HOME | End: 2018-09-18
Attending: OBSTETRICS & GYNECOLOGY | Admitting: OBSTETRICS & GYNECOLOGY
Payer: COMMERCIAL

## 2018-09-08 VITALS — HEIGHT: 67 IN | WEIGHT: 202.83 LBS

## 2018-09-08 DIAGNOSIS — Z3A.00 WEEKS OF GESTATION OF PREGNANCY NOT SPECIFIED: ICD-10-CM

## 2018-09-08 DIAGNOSIS — O26.899 OTHER SPECIFIED PREGNANCY RELATED CONDITIONS, UNSPECIFIED TRIMESTER: ICD-10-CM

## 2018-09-08 DIAGNOSIS — M67.922 UNSPECIFIED DISORDER OF SYNOVIUM AND TENDON, LEFT UPPER ARM: Chronic | ICD-10-CM

## 2018-09-08 LAB
BASOPHILS # BLD AUTO: 0.04 K/UL — SIGNIFICANT CHANGE UP (ref 0–0.2)
BASOPHILS NFR BLD AUTO: 0.3 % — SIGNIFICANT CHANGE UP (ref 0–2)
BLD GP AB SCN SERPL QL: NEGATIVE — SIGNIFICANT CHANGE UP
EOSINOPHIL # BLD AUTO: 0.06 K/UL — SIGNIFICANT CHANGE UP (ref 0–0.5)
EOSINOPHIL NFR BLD AUTO: 0.5 % — SIGNIFICANT CHANGE UP (ref 0–6)
HCT VFR BLD CALC: 36.7 % — SIGNIFICANT CHANGE UP (ref 34.5–45)
HGB BLD-MCNC: 11.9 G/DL — SIGNIFICANT CHANGE UP (ref 11.5–15.5)
IMM GRANULOCYTES # BLD AUTO: 0.06 # — SIGNIFICANT CHANGE UP
IMM GRANULOCYTES NFR BLD AUTO: 0.5 % — SIGNIFICANT CHANGE UP (ref 0–1.5)
LYMPHOCYTES # BLD AUTO: 18.6 % — SIGNIFICANT CHANGE UP (ref 13–44)
LYMPHOCYTES # BLD AUTO: 2.23 K/UL — SIGNIFICANT CHANGE UP (ref 1–3.3)
MCHC RBC-ENTMCNC: 27.7 PG — SIGNIFICANT CHANGE UP (ref 27–34)
MCHC RBC-ENTMCNC: 32.4 % — SIGNIFICANT CHANGE UP (ref 32–36)
MCV RBC AUTO: 85.5 FL — SIGNIFICANT CHANGE UP (ref 80–100)
MONOCYTES # BLD AUTO: 0.67 K/UL — SIGNIFICANT CHANGE UP (ref 0–0.9)
MONOCYTES NFR BLD AUTO: 5.6 % — SIGNIFICANT CHANGE UP (ref 2–14)
NEUTROPHILS # BLD AUTO: 8.9 K/UL — HIGH (ref 1.8–7.4)
NEUTROPHILS NFR BLD AUTO: 74.5 % — SIGNIFICANT CHANGE UP (ref 43–77)
NRBC # FLD: 0 — SIGNIFICANT CHANGE UP
PLATELET # BLD AUTO: 233 K/UL — SIGNIFICANT CHANGE UP (ref 150–400)
PMV BLD: 11.6 FL — SIGNIFICANT CHANGE UP (ref 7–13)
RBC # BLD: 4.29 M/UL — SIGNIFICANT CHANGE UP (ref 3.8–5.2)
RBC # FLD: 13.7 % — SIGNIFICANT CHANGE UP (ref 10.3–14.5)
RH IG SCN BLD-IMP: NEGATIVE — SIGNIFICANT CHANGE UP
RH IG SCN BLD-IMP: NEGATIVE — SIGNIFICANT CHANGE UP
WBC # BLD: 11.96 K/UL — HIGH (ref 3.8–10.5)
WBC # FLD AUTO: 11.96 K/UL — HIGH (ref 3.8–10.5)

## 2018-09-08 RX ORDER — OXYTOCIN 10 UNIT/ML
333.33 VIAL (ML) INJECTION
Qty: 20 | Refills: 0 | Status: DISCONTINUED | OUTPATIENT
Start: 2018-09-08 | End: 2018-09-08

## 2018-09-08 RX ORDER — SODIUM CHLORIDE 9 MG/ML
1000 INJECTION, SOLUTION INTRAVENOUS ONCE
Qty: 0 | Refills: 0 | Status: DISCONTINUED | OUTPATIENT
Start: 2018-09-08 | End: 2018-09-08

## 2018-09-08 RX ORDER — CETIRIZINE HYDROCHLORIDE 10 MG/1
1 TABLET ORAL
Qty: 0 | Refills: 0 | COMMUNITY

## 2018-09-08 RX ORDER — SODIUM CHLORIDE 9 MG/ML
1000 INJECTION, SOLUTION INTRAVENOUS
Qty: 0 | Refills: 0 | Status: DISCONTINUED | OUTPATIENT
Start: 2018-09-08 | End: 2018-09-08

## 2018-09-08 RX ORDER — INFLUENZA VIRUS VACCINE 15; 15; 15; 15 UG/.5ML; UG/.5ML; UG/.5ML; UG/.5ML
0.5 SUSPENSION INTRAMUSCULAR ONCE
Qty: 0 | Refills: 0 | Status: DISCONTINUED | OUTPATIENT
Start: 2018-09-08 | End: 2018-09-18

## 2018-09-08 NOTE — PATIENT PROFILE OB - ALERT: PERTINENT HISTORY
1st Trimester Sonogram/Fetal Non-Stress Test (NST)/20 Week Level II Sonogram/Ultra Screen at 12 Weeks

## 2018-09-09 ENCOUNTER — TRANSCRIPTION ENCOUNTER (OUTPATIENT)
Age: 38
End: 2018-09-09

## 2018-09-09 DIAGNOSIS — O41.00X0 OLIGOHYDRAMNIOS, UNSPECIFIED TRIMESTER, NOT APPLICABLE OR UNSPECIFIED: ICD-10-CM

## 2018-09-09 LAB
GP B STREP DNA SPEC QL NAA+PROBE: NORMAL
GP B STREP DNA SPEC QL NAA+PROBE: NOT DETECTED
SOURCE GBS: NORMAL
T PALLIDUM AB TITR SER: NEGATIVE — SIGNIFICANT CHANGE UP

## 2018-09-09 RX ORDER — ONDANSETRON 8 MG/1
4 TABLET, FILM COATED ORAL ONCE
Qty: 0 | Refills: 0 | Status: COMPLETED | OUTPATIENT
Start: 2018-09-09 | End: 2018-09-09

## 2018-09-09 RX ORDER — SODIUM CHLORIDE 9 MG/ML
1000 INJECTION, SOLUTION INTRAVENOUS ONCE
Qty: 0 | Refills: 0 | Status: COMPLETED | OUTPATIENT
Start: 2018-09-09 | End: 2018-09-09

## 2018-09-09 RX ORDER — SODIUM CHLORIDE 9 MG/ML
1000 INJECTION, SOLUTION INTRAVENOUS
Qty: 0 | Refills: 0 | Status: DISCONTINUED | OUTPATIENT
Start: 2018-09-09 | End: 2018-09-10

## 2018-09-09 RX ORDER — OXYTOCIN 10 UNIT/ML
333.33 VIAL (ML) INJECTION
Qty: 20 | Refills: 0 | Status: DISCONTINUED | OUTPATIENT
Start: 2018-09-09 | End: 2018-09-10

## 2018-09-09 RX ORDER — CITRIC ACID/SODIUM CITRATE 300-500 MG
15 SOLUTION, ORAL ORAL EVERY 4 HOURS
Qty: 0 | Refills: 0 | Status: DISCONTINUED | OUTPATIENT
Start: 2018-09-09 | End: 2018-09-10

## 2018-09-09 RX ADMIN — ONDANSETRON 4 MILLIGRAM(S): 8 TABLET, FILM COATED ORAL at 13:42

## 2018-09-09 RX ADMIN — SODIUM CHLORIDE 2000 MILLILITER(S): 9 INJECTION, SOLUTION INTRAVENOUS at 19:00

## 2018-09-10 ENCOUNTER — APPOINTMENT (OUTPATIENT)
Dept: ANTEPARTUM | Facility: CLINIC | Age: 38
End: 2018-09-10

## 2018-09-10 ENCOUNTER — TRANSCRIPTION ENCOUNTER (OUTPATIENT)
Age: 38
End: 2018-09-10

## 2018-09-10 ENCOUNTER — APPOINTMENT (OUTPATIENT)
Dept: ANTEPARTUM | Facility: HOSPITAL | Age: 38
End: 2018-09-10

## 2018-09-10 LAB
ALBUMIN SERPL ELPH-MCNC: 2.7 G/DL — LOW (ref 3.3–5)
ALP SERPL-CCNC: 109 U/L — SIGNIFICANT CHANGE UP (ref 40–120)
ALT FLD-CCNC: 8 U/L — SIGNIFICANT CHANGE UP (ref 4–33)
APPEARANCE UR: CLEAR — SIGNIFICANT CHANGE UP
APTT BLD: 26.8 SEC — LOW (ref 27.5–37.4)
AST SERPL-CCNC: 19 U/L — SIGNIFICANT CHANGE UP (ref 4–32)
BACTERIA # UR AUTO: NEGATIVE — SIGNIFICANT CHANGE UP
BASOPHILS # BLD AUTO: 0.06 K/UL — SIGNIFICANT CHANGE UP (ref 0–0.2)
BASOPHILS NFR BLD AUTO: 0.5 % — SIGNIFICANT CHANGE UP (ref 0–2)
BILIRUB SERPL-MCNC: 0.6 MG/DL — SIGNIFICANT CHANGE UP (ref 0.2–1.2)
BILIRUB UR-MCNC: NEGATIVE — SIGNIFICANT CHANGE UP
BLD GP AB SCN SERPL QL: NEGATIVE — SIGNIFICANT CHANGE UP
BLOOD UR QL VISUAL: HIGH
BUN SERPL-MCNC: 7 MG/DL — SIGNIFICANT CHANGE UP (ref 7–23)
CALCIUM SERPL-MCNC: 8.5 MG/DL — SIGNIFICANT CHANGE UP (ref 8.4–10.5)
CHLORIDE SERPL-SCNC: 105 MMOL/L — SIGNIFICANT CHANGE UP (ref 98–107)
CO2 SERPL-SCNC: 19 MMOL/L — LOW (ref 22–31)
COLOR SPEC: SIGNIFICANT CHANGE UP
CREAT ?TM UR-MCNC: 85.1 MG/DL — SIGNIFICANT CHANGE UP
CREAT SERPL-MCNC: 0.66 MG/DL — SIGNIFICANT CHANGE UP (ref 0.5–1.3)
EOSINOPHIL # BLD AUTO: 0.02 K/UL — SIGNIFICANT CHANGE UP (ref 0–0.5)
EOSINOPHIL NFR BLD AUTO: 0.2 % — SIGNIFICANT CHANGE UP (ref 0–6)
FIBRINOGEN PPP-MCNC: 792 MG/DL — HIGH (ref 310–510)
GLUCOSE SERPL-MCNC: 66 MG/DL — LOW (ref 70–99)
GLUCOSE UR-MCNC: NEGATIVE — SIGNIFICANT CHANGE UP
HCT VFR BLD CALC: 32.8 % — LOW (ref 34.5–45)
HGB BLD-MCNC: 10.8 G/DL — LOW (ref 11.5–15.5)
HYALINE CASTS # UR AUTO: NEGATIVE — SIGNIFICANT CHANGE UP
IMM GRANULOCYTES # BLD AUTO: 0.05 # — SIGNIFICANT CHANGE UP
IMM GRANULOCYTES NFR BLD AUTO: 0.4 % — SIGNIFICANT CHANGE UP (ref 0–1.5)
INR BLD: 0.98 — SIGNIFICANT CHANGE UP (ref 0.88–1.17)
KETONES UR-MCNC: SIGNIFICANT CHANGE UP
LDH SERPL L TO P-CCNC: 169 U/L — SIGNIFICANT CHANGE UP (ref 135–225)
LEUKOCYTE ESTERASE UR-ACNC: NEGATIVE — SIGNIFICANT CHANGE UP
LYMPHOCYTES # BLD AUTO: 1.94 K/UL — SIGNIFICANT CHANGE UP (ref 1–3.3)
LYMPHOCYTES # BLD AUTO: 16.4 % — SIGNIFICANT CHANGE UP (ref 13–44)
MCHC RBC-ENTMCNC: 28.4 PG — SIGNIFICANT CHANGE UP (ref 27–34)
MCHC RBC-ENTMCNC: 32.9 % — SIGNIFICANT CHANGE UP (ref 32–36)
MCV RBC AUTO: 86.3 FL — SIGNIFICANT CHANGE UP (ref 80–100)
MONOCYTES # BLD AUTO: 0.74 K/UL — SIGNIFICANT CHANGE UP (ref 0–0.9)
MONOCYTES NFR BLD AUTO: 6.3 % — SIGNIFICANT CHANGE UP (ref 2–14)
NEUTROPHILS # BLD AUTO: 9.01 K/UL — HIGH (ref 1.8–7.4)
NEUTROPHILS NFR BLD AUTO: 76.2 % — SIGNIFICANT CHANGE UP (ref 43–77)
NITRITE UR-MCNC: NEGATIVE — SIGNIFICANT CHANGE UP
NRBC # FLD: 0 — SIGNIFICANT CHANGE UP
PH UR: 6.5 — SIGNIFICANT CHANGE UP (ref 5–8)
PLATELET # BLD AUTO: 191 K/UL — SIGNIFICANT CHANGE UP (ref 150–400)
PMV BLD: 12 FL — SIGNIFICANT CHANGE UP (ref 7–13)
POTASSIUM SERPL-MCNC: 4 MMOL/L — SIGNIFICANT CHANGE UP (ref 3.5–5.3)
POTASSIUM SERPL-SCNC: 4 MMOL/L — SIGNIFICANT CHANGE UP (ref 3.5–5.3)
PROT SERPL-MCNC: 6.2 G/DL — SIGNIFICANT CHANGE UP (ref 6–8.3)
PROT UR-MCNC: 10 — SIGNIFICANT CHANGE UP
PROT UR-MCNC: 15.7 MG/DL — SIGNIFICANT CHANGE UP
PROTHROM AB SERPL-ACNC: 10.9 SEC — SIGNIFICANT CHANGE UP (ref 9.8–13.1)
RBC # BLD: 3.8 M/UL — SIGNIFICANT CHANGE UP (ref 3.8–5.2)
RBC # FLD: 13.9 % — SIGNIFICANT CHANGE UP (ref 10.3–14.5)
RBC CASTS # UR COMP ASSIST: SIGNIFICANT CHANGE UP (ref 0–?)
RH IG SCN BLD-IMP: NEGATIVE — SIGNIFICANT CHANGE UP
SODIUM SERPL-SCNC: 134 MMOL/L — LOW (ref 135–145)
SP GR SPEC: 1.01 — SIGNIFICANT CHANGE UP (ref 1–1.04)
SQUAMOUS # UR AUTO: SIGNIFICANT CHANGE UP
URATE SERPL-MCNC: 4.9 MG/DL — SIGNIFICANT CHANGE UP (ref 2.5–7)
UROBILINOGEN FLD QL: NORMAL — SIGNIFICANT CHANGE UP
WBC # BLD: 11.82 K/UL — HIGH (ref 3.8–10.5)
WBC # FLD AUTO: 11.82 K/UL — HIGH (ref 3.8–10.5)
WBC UR QL: SIGNIFICANT CHANGE UP (ref 0–?)

## 2018-09-10 PROCEDURE — 59510 CESAREAN DELIVERY: CPT | Mod: U9,UB,GC

## 2018-09-10 RX ORDER — OXYTOCIN 10 UNIT/ML
333.33 VIAL (ML) INJECTION
Qty: 20 | Refills: 0 | Status: DISCONTINUED | OUTPATIENT
Start: 2018-09-10 | End: 2018-09-11

## 2018-09-10 RX ORDER — DOCUSATE SODIUM 100 MG
100 CAPSULE ORAL
Qty: 0 | Refills: 0 | Status: DISCONTINUED | OUTPATIENT
Start: 2018-09-13 | End: 2018-09-18

## 2018-09-10 RX ORDER — FERROUS SULFATE 325(65) MG
325 TABLET ORAL DAILY
Qty: 0 | Refills: 0 | Status: DISCONTINUED | OUTPATIENT
Start: 2018-09-13 | End: 2018-09-16

## 2018-09-10 RX ORDER — OXYCODONE HYDROCHLORIDE 5 MG/1
5 TABLET ORAL
Qty: 0 | Refills: 0 | Status: COMPLETED | OUTPATIENT
Start: 2018-09-10 | End: 2018-09-17

## 2018-09-10 RX ORDER — OXYTOCIN 10 UNIT/ML
41.67 VIAL (ML) INJECTION
Qty: 20 | Refills: 0 | Status: DISCONTINUED | OUTPATIENT
Start: 2018-09-10 | End: 2018-09-11

## 2018-09-10 RX ORDER — TETANUS TOXOID, REDUCED DIPHTHERIA TOXOID AND ACELLULAR PERTUSSIS VACCINE, ADSORBED 5; 2.5; 8; 8; 2.5 [IU]/.5ML; [IU]/.5ML; UG/.5ML; UG/.5ML; UG/.5ML
0.5 SUSPENSION INTRAMUSCULAR ONCE
Qty: 0 | Refills: 0 | Status: DISCONTINUED | OUTPATIENT
Start: 2018-09-13 | End: 2018-09-18

## 2018-09-10 RX ORDER — SODIUM CHLORIDE 9 MG/ML
500 INJECTION, SOLUTION INTRAVENOUS ONCE
Qty: 0 | Refills: 0 | Status: COMPLETED | OUTPATIENT
Start: 2018-09-10 | End: 2018-09-10

## 2018-09-10 RX ORDER — SODIUM CHLORIDE 9 MG/ML
1000 INJECTION, SOLUTION INTRAVENOUS
Qty: 0 | Refills: 0 | Status: DISCONTINUED | OUTPATIENT
Start: 2018-09-10 | End: 2018-09-11

## 2018-09-10 RX ORDER — SODIUM CHLORIDE 9 MG/ML
1000 INJECTION, SOLUTION INTRAVENOUS
Qty: 0 | Refills: 0 | Status: DISCONTINUED | OUTPATIENT
Start: 2018-09-11 | End: 2018-09-14

## 2018-09-10 RX ORDER — OXYCODONE HYDROCHLORIDE 5 MG/1
10 TABLET ORAL
Qty: 0 | Refills: 0 | Status: DISCONTINUED | OUTPATIENT
Start: 2018-09-13 | End: 2018-09-18

## 2018-09-10 RX ORDER — NALOXONE HYDROCHLORIDE 4 MG/.1ML
0.1 SPRAY NASAL
Qty: 0 | Refills: 0 | Status: DISCONTINUED | OUTPATIENT
Start: 2018-09-13 | End: 2018-09-18

## 2018-09-10 RX ORDER — GLYCERIN ADULT
1 SUPPOSITORY, RECTAL RECTAL AT BEDTIME
Qty: 0 | Refills: 0 | Status: DISCONTINUED | OUTPATIENT
Start: 2018-09-13 | End: 2018-09-18

## 2018-09-10 RX ORDER — KETOROLAC TROMETHAMINE 30 MG/ML
30 SYRINGE (ML) INJECTION EVERY 6 HOURS
Qty: 0 | Refills: 0 | Status: DISCONTINUED | OUTPATIENT
Start: 2018-09-10 | End: 2018-09-11

## 2018-09-10 RX ORDER — LANOLIN
1 OINTMENT (GRAM) TOPICAL
Qty: 0 | Refills: 0 | Status: DISCONTINUED | OUTPATIENT
Start: 2018-09-13 | End: 2018-09-18

## 2018-09-10 RX ORDER — OXYCODONE HYDROCHLORIDE 5 MG/1
5 TABLET ORAL
Qty: 0 | Refills: 0 | Status: DISCONTINUED | OUTPATIENT
Start: 2018-09-13 | End: 2018-09-18

## 2018-09-10 RX ORDER — ONDANSETRON 8 MG/1
4 TABLET, FILM COATED ORAL EVERY 6 HOURS
Qty: 0 | Refills: 0 | Status: DISCONTINUED | OUTPATIENT
Start: 2018-09-10 | End: 2018-09-11

## 2018-09-10 RX ORDER — DIPHENHYDRAMINE HCL 50 MG
25 CAPSULE ORAL EVERY 6 HOURS
Qty: 0 | Refills: 0 | Status: DISCONTINUED | OUTPATIENT
Start: 2018-09-13 | End: 2018-09-18

## 2018-09-10 RX ORDER — SIMETHICONE 80 MG/1
80 TABLET, CHEWABLE ORAL EVERY 4 HOURS
Qty: 0 | Refills: 0 | Status: DISCONTINUED | OUTPATIENT
Start: 2018-09-13 | End: 2018-09-18

## 2018-09-10 RX ORDER — IBUPROFEN 200 MG
600 TABLET ORAL EVERY 6 HOURS
Qty: 0 | Refills: 0 | Status: DISCONTINUED | OUTPATIENT
Start: 2018-09-10 | End: 2018-09-11

## 2018-09-10 RX ORDER — HYDROMORPHONE HYDROCHLORIDE 2 MG/ML
0.5 INJECTION INTRAMUSCULAR; INTRAVENOUS; SUBCUTANEOUS
Qty: 0 | Refills: 0 | Status: DISCONTINUED | OUTPATIENT
Start: 2018-09-10 | End: 2018-09-11

## 2018-09-10 RX ORDER — OXYCODONE HYDROCHLORIDE 5 MG/1
5 TABLET ORAL EVERY 4 HOURS
Qty: 0 | Refills: 0 | Status: DISCONTINUED | OUTPATIENT
Start: 2018-09-10 | End: 2018-09-11

## 2018-09-10 RX ORDER — OXYTOCIN 10 UNIT/ML
2 VIAL (ML) INJECTION
Qty: 30 | Refills: 0 | Status: DISCONTINUED | OUTPATIENT
Start: 2018-09-10 | End: 2018-09-10

## 2018-09-10 RX ADMIN — HYDROMORPHONE HYDROCHLORIDE 0.5 MILLIGRAM(S): 2 INJECTION INTRAMUSCULAR; INTRAVENOUS; SUBCUTANEOUS at 23:22

## 2018-09-10 RX ADMIN — Medication 2 MILLIUNIT(S)/MIN: at 15:49

## 2018-09-10 RX ADMIN — SODIUM CHLORIDE 1500 MILLILITER(S): 9 INJECTION, SOLUTION INTRAVENOUS at 23:50

## 2018-09-10 RX ADMIN — ONDANSETRON 4 MILLIGRAM(S): 8 TABLET, FILM COATED ORAL at 23:22

## 2018-09-10 RX ADMIN — Medication 125 MILLIUNIT(S)/MIN: at 21:38

## 2018-09-10 NOTE — PROVIDER CONTACT NOTE (OTHER) - ACTION/TREATMENT ORDERED:
Dr. Puente bedside to assess pt. V/E performed by MD. No clots expressed. EBL:200mL Will cont. to monitor pt.

## 2018-09-10 NOTE — PROVIDER CONTACT NOTE (OTHER) - ASSESSMENT
Pt. denies feeling lightheaded, dizzy, SOB, chest pain; Lungs: clear to auscultation; Fundus: Firm @umbilicus with heavy lochia; Urine: yellow concentrated

## 2018-09-10 NOTE — CHART NOTE - NSCHARTNOTEFT_GEN_A_CORE
R3 OB Postpartum Note    Patient seen and examined at bedside in PACU for result of increased post-op vaginal bleeding.  200 cc blood and clots on bib.  Patient is without complaint, denies lightheadedness, CP, SOB, and pain is well controlled.  Bimanual exam performed, no clots or tissue noted, fundus and MARCY firm.      Physical exam:    Vital Signs Last 24 Hrs  T(C): 36.7 (10 Sep 2018 21:15), Max: 36.7 (10 Sep 2018 21:15)  T(F): 98.1 (10 Sep 2018 21:15), Max: 98.1 (10 Sep 2018 21:15)  HR: 91 (10 Sep 2018 23:00) (89 - 103)  BP: 152/62 (10 Sep 2018 23:00) (104/48 - 152/62)  BP(mean): 85 (10 Sep 2018 23:00) (63 - 95)  RR: 22 (10 Sep 2018 23:00) (18 - 22)  SpO2: 98% (10 Sep 2018 23:00) (98% - 100%)    09-09 @ 07:01  -  09-10 @ 07:00  --------------------------------------------------------  IN: 4000 mL / OUT: 0 mL / NET: 4000 mL    09-10 @ 07:01  -  09-10 @ 23:32  --------------------------------------------------------  IN: 3625 mL / OUT: 2100 mL / NET: 1525 mL        Gen: NAD  CV RRR S1S2  Pulm CTAB  Abdomen: Soft, appropriate post-op tenderness, non- distended , firm uterine fundus at umbilicus.  Incision: Clean, dry, and intact with steri strips  Pelvic: Normal lochia noted  Ext: No calf tenderness    LABS:                        10.8   11.82 )-----------( 191      ( 10 Sep 2018 18:16 )             32.8     ABO Interpretation: A (09-10 @ 18:02)  Rh Interpretation: Negative (09-10 @ 18:02)  Antibody Screen: Negative (09-10 @ 18:02)    09-10-18 @ 18:16      134<L>  |  105  |  7   ----------------------------<  66<L>  4.0   |  19<L>  |  0.66        Ca    8.5      10 Sep 2018 18:16    TPro  6.2  /  Alb  2.7<L>  /  TBili  0.6  /  DBili  x   /  AST  19  /  ALT  8   /  AlkPhos  109  09-10-18 @ 18:16        37y POD0 s/p pLTCS with  now with additional 200 cc of bleeding.  Vital signs are stable, UOP is adequate, and uterus is firm and there is no further bleeding.  Patient is without complaint.  -Conitnue routine postop care  -Monitor VS, pad counts  -AM CBC    dw Dr Kg Puente PGY3

## 2018-09-11 ENCOUNTER — RESULT REVIEW (OUTPATIENT)
Age: 38
End: 2018-09-11

## 2018-09-11 LAB
ALBUMIN SERPL ELPH-MCNC: 2.2 G/DL — LOW (ref 3.3–5)
ALBUMIN SERPL ELPH-MCNC: 2.4 G/DL — LOW (ref 3.3–5)
ALBUMIN SERPL ELPH-MCNC: 2.6 G/DL — LOW (ref 3.3–5)
ALP SERPL-CCNC: 45 U/L — SIGNIFICANT CHANGE UP (ref 40–120)
ALP SERPL-CCNC: 46 U/L — SIGNIFICANT CHANGE UP (ref 40–120)
ALP SERPL-CCNC: 74 U/L — SIGNIFICANT CHANGE UP (ref 40–120)
ALP SERPL-CCNC: 77 U/L — SIGNIFICANT CHANGE UP (ref 40–120)
ALP SERPL-CCNC: 89 U/L — SIGNIFICANT CHANGE UP (ref 40–120)
ALT FLD-CCNC: 10 U/L — SIGNIFICANT CHANGE UP (ref 4–33)
ALT FLD-CCNC: 13 U/L — SIGNIFICANT CHANGE UP (ref 4–33)
ALT FLD-CCNC: 13 U/L — SIGNIFICANT CHANGE UP (ref 4–33)
ANISOCYTOSIS BLD QL: SLIGHT — SIGNIFICANT CHANGE UP
APTT BLD: 27.3 SEC — LOW (ref 27.5–37.4)
APTT BLD: 28.5 SEC — SIGNIFICANT CHANGE UP (ref 27.5–37.4)
APTT BLD: 29.4 SEC — SIGNIFICANT CHANGE UP (ref 27.5–37.4)
APTT BLD: 29.7 SEC — SIGNIFICANT CHANGE UP (ref 27.5–37.4)
APTT BLD: 35 SEC — SIGNIFICANT CHANGE UP (ref 27.5–37.4)
APTT BLD: 35.4 SEC — SIGNIFICANT CHANGE UP (ref 27.5–37.4)
APTT BLD: 42.5 SEC — HIGH (ref 27.5–37.4)
APTT BLD: 56.4 SEC — HIGH (ref 27.5–37.4)
AST SERPL-CCNC: 24 U/L — SIGNIFICANT CHANGE UP (ref 4–32)
AST SERPL-CCNC: 27 U/L — SIGNIFICANT CHANGE UP (ref 4–32)
AST SERPL-CCNC: 42 U/L — HIGH (ref 4–32)
AST SERPL-CCNC: 43 U/L — HIGH (ref 4–32)
AST SERPL-CCNC: 44 U/L — HIGH (ref 4–32)
BASE EXCESS BLDA CALC-SCNC: -3.4 MMOL/L — SIGNIFICANT CHANGE UP
BASE EXCESS BLDA CALC-SCNC: -3.8 MMOL/L — SIGNIFICANT CHANGE UP
BASE EXCESS BLDA CALC-SCNC: -4.5 MMOL/L — SIGNIFICANT CHANGE UP
BASE EXCESS BLDA CALC-SCNC: -4.7 MMOL/L — SIGNIFICANT CHANGE UP
BASE EXCESS BLDA CALC-SCNC: -5.3 MMOL/L — SIGNIFICANT CHANGE UP
BASE EXCESS BLDA CALC-SCNC: -6.5 MMOL/L — SIGNIFICANT CHANGE UP
BASE EXCESS BLDA CALC-SCNC: -6.8 MMOL/L — SIGNIFICANT CHANGE UP
BASE EXCESS BLDA CALC-SCNC: -7 MMOL/L — SIGNIFICANT CHANGE UP
BASE EXCESS BLDA CALC-SCNC: -7 MMOL/L — SIGNIFICANT CHANGE UP
BASE EXCESS BLDA CALC-SCNC: -7.7 MMOL/L — SIGNIFICANT CHANGE UP
BASOPHILS # BLD AUTO: 0.01 K/UL — SIGNIFICANT CHANGE UP (ref 0–0.2)
BASOPHILS # BLD AUTO: 0.01 K/UL — SIGNIFICANT CHANGE UP (ref 0–0.2)
BASOPHILS # BLD AUTO: 0.02 K/UL — SIGNIFICANT CHANGE UP (ref 0–0.2)
BASOPHILS # BLD AUTO: 0.03 K/UL — SIGNIFICANT CHANGE UP (ref 0–0.2)
BASOPHILS NFR BLD AUTO: 0.1 % — SIGNIFICANT CHANGE UP (ref 0–2)
BASOPHILS NFR BLD AUTO: 0.2 % — SIGNIFICANT CHANGE UP (ref 0–2)
BASOPHILS NFR SPEC: 0 % — SIGNIFICANT CHANGE UP (ref 0–2)
BILIRUB SERPL-MCNC: 1.2 MG/DL — SIGNIFICANT CHANGE UP (ref 0.2–1.2)
BILIRUB SERPL-MCNC: 1.7 MG/DL — HIGH (ref 0.2–1.2)
BILIRUB SERPL-MCNC: 1.8 MG/DL — HIGH (ref 0.2–1.2)
BILIRUB SERPL-MCNC: 2.3 MG/DL — HIGH (ref 0.2–1.2)
BILIRUB SERPL-MCNC: 2.6 MG/DL — HIGH (ref 0.2–1.2)
BLASTS # FLD: 0 % — SIGNIFICANT CHANGE UP (ref 0–0)
BUN SERPL-MCNC: 12 MG/DL — SIGNIFICANT CHANGE UP (ref 7–23)
BUN SERPL-MCNC: 14 MG/DL — SIGNIFICANT CHANGE UP (ref 7–23)
BUN SERPL-MCNC: 15 MG/DL — SIGNIFICANT CHANGE UP (ref 7–23)
BUN SERPL-MCNC: 17 MG/DL — SIGNIFICANT CHANGE UP (ref 7–23)
BUN SERPL-MCNC: 17 MG/DL — SIGNIFICANT CHANGE UP (ref 7–23)
BUN SERPL-MCNC: 19 MG/DL — SIGNIFICANT CHANGE UP (ref 7–23)
CA-I BLDA-SCNC: 0.94 MMOL/L — LOW (ref 1.15–1.29)
CA-I BLDA-SCNC: 0.95 MMOL/L — LOW (ref 1.15–1.29)
CA-I BLDA-SCNC: 1.04 MMOL/L — LOW (ref 1.15–1.29)
CA-I BLDA-SCNC: 1.12 MMOL/L — LOW (ref 1.15–1.29)
CA-I BLDA-SCNC: 1.14 MMOL/L — LOW (ref 1.15–1.29)
CA-I BLDA-SCNC: 1.14 MMOL/L — LOW (ref 1.15–1.29)
CA-I BLDA-SCNC: 1.22 MMOL/L — SIGNIFICANT CHANGE UP (ref 1.15–1.29)
CALCIUM SERPL-MCNC: 7.6 MG/DL — LOW (ref 8.4–10.5)
CALCIUM SERPL-MCNC: 7.8 MG/DL — LOW (ref 8.4–10.5)
CALCIUM SERPL-MCNC: 7.8 MG/DL — LOW (ref 8.4–10.5)
CALCIUM SERPL-MCNC: 7.9 MG/DL — LOW (ref 8.4–10.5)
CALCIUM SERPL-MCNC: 8.1 MG/DL — LOW (ref 8.4–10.5)
CALCIUM SERPL-MCNC: 8.3 MG/DL — LOW (ref 8.4–10.5)
CHLORIDE BLDA-SCNC: 108 MMOL/L — SIGNIFICANT CHANGE UP (ref 96–108)
CHLORIDE SERPL-SCNC: 102 MMOL/L — SIGNIFICANT CHANGE UP (ref 98–107)
CHLORIDE SERPL-SCNC: 103 MMOL/L — SIGNIFICANT CHANGE UP (ref 98–107)
CHLORIDE SERPL-SCNC: 104 MMOL/L — SIGNIFICANT CHANGE UP (ref 98–107)
CHLORIDE SERPL-SCNC: 104 MMOL/L — SIGNIFICANT CHANGE UP (ref 98–107)
CHLORIDE SERPL-SCNC: 106 MMOL/L — SIGNIFICANT CHANGE UP (ref 98–107)
CHLORIDE SERPL-SCNC: 106 MMOL/L — SIGNIFICANT CHANGE UP (ref 98–107)
CO2 SERPL-SCNC: 17 MMOL/L — LOW (ref 22–31)
CO2 SERPL-SCNC: 17 MMOL/L — LOW (ref 22–31)
CO2 SERPL-SCNC: 18 MMOL/L — LOW (ref 22–31)
CO2 SERPL-SCNC: 19 MMOL/L — LOW (ref 22–31)
CO2 SERPL-SCNC: 20 MMOL/L — LOW (ref 22–31)
CO2 SERPL-SCNC: 20 MMOL/L — LOW (ref 22–31)
CREAT SERPL-MCNC: 1.19 MG/DL — SIGNIFICANT CHANGE UP (ref 0.5–1.3)
CREAT SERPL-MCNC: 1.31 MG/DL — HIGH (ref 0.5–1.3)
CREAT SERPL-MCNC: 1.63 MG/DL — HIGH (ref 0.5–1.3)
CREAT SERPL-MCNC: 1.75 MG/DL — HIGH (ref 0.5–1.3)
CREAT SERPL-MCNC: 1.92 MG/DL — HIGH (ref 0.5–1.3)
CREAT SERPL-MCNC: 2.11 MG/DL — HIGH (ref 0.5–1.3)
EOSINOPHIL # BLD AUTO: 0 K/UL — SIGNIFICANT CHANGE UP (ref 0–0.5)
EOSINOPHIL NFR BLD AUTO: 0 % — SIGNIFICANT CHANGE UP (ref 0–6)
EOSINOPHIL NFR FLD: 0 % — SIGNIFICANT CHANGE UP (ref 0–6)
FIBRINOGEN PPP-MCNC: 228 MG/DL — LOW (ref 310–510)
FIBRINOGEN PPP-MCNC: 255.8 MG/DL — LOW (ref 310–510)
FIBRINOGEN PPP-MCNC: 315.5 MG/DL — SIGNIFICANT CHANGE UP (ref 310–510)
FIBRINOGEN PPP-MCNC: 328 MG/DL — SIGNIFICANT CHANGE UP (ref 310–510)
FIBRINOGEN PPP-MCNC: 384.2 MG/DL — SIGNIFICANT CHANGE UP (ref 310–510)
FIBRINOGEN PPP-MCNC: 423 MG/DL — SIGNIFICANT CHANGE UP (ref 310–510)
FIBRINOGEN PPP-MCNC: 445 MG/DL — SIGNIFICANT CHANGE UP (ref 310–510)
FIBRINOGEN PPP-MCNC: 471.9 MG/DL — SIGNIFICANT CHANGE UP (ref 310–510)
GIANT PLATELETS BLD QL SMEAR: PRESENT — SIGNIFICANT CHANGE UP
GLUCOSE BLDA-MCNC: 101 MG/DL — HIGH (ref 70–99)
GLUCOSE BLDA-MCNC: 111 MG/DL — HIGH (ref 70–99)
GLUCOSE BLDA-MCNC: 117 MG/DL — HIGH (ref 70–99)
GLUCOSE BLDA-MCNC: 120 MG/DL — HIGH (ref 70–99)
GLUCOSE BLDA-MCNC: 124 MG/DL — HIGH (ref 70–99)
GLUCOSE BLDA-MCNC: 142 MG/DL — HIGH (ref 70–99)
GLUCOSE BLDA-MCNC: 154 MG/DL — HIGH (ref 70–99)
GLUCOSE BLDA-MCNC: 99 MG/DL — SIGNIFICANT CHANGE UP (ref 70–99)
GLUCOSE SERPL-MCNC: 113 MG/DL — HIGH (ref 70–99)
GLUCOSE SERPL-MCNC: 119 MG/DL — HIGH (ref 70–99)
GLUCOSE SERPL-MCNC: 146 MG/DL — HIGH (ref 70–99)
GLUCOSE SERPL-MCNC: 88 MG/DL — SIGNIFICANT CHANGE UP (ref 70–99)
GLUCOSE SERPL-MCNC: 89 MG/DL — SIGNIFICANT CHANGE UP (ref 70–99)
GLUCOSE SERPL-MCNC: 93 MG/DL — SIGNIFICANT CHANGE UP (ref 70–99)
HCO3 BLDA-SCNC: 18 MMOL/L — LOW (ref 22–26)
HCO3 BLDA-SCNC: 19 MMOL/L — LOW (ref 22–26)
HCO3 BLDA-SCNC: 20 MMOL/L — LOW (ref 22–26)
HCO3 BLDA-SCNC: 21 MMOL/L — LOW (ref 22–26)
HCO3 BLDA-SCNC: 22 MMOL/L — SIGNIFICANT CHANGE UP (ref 22–26)
HCT VFR BLD CALC: 20 % — CRITICAL LOW (ref 34.5–45)
HCT VFR BLD CALC: 20 % — CRITICAL LOW (ref 34.5–45)
HCT VFR BLD CALC: 20.5 % — CRITICAL LOW (ref 34.5–45)
HCT VFR BLD CALC: 22.9 % — LOW (ref 34.5–45)
HCT VFR BLD CALC: 24.5 % — LOW (ref 34.5–45)
HCT VFR BLD CALC: 24.6 % — LOW (ref 34.5–45)
HCT VFR BLD CALC: 25.3 % — LOW (ref 34.5–45)
HCT VFR BLD CALC: 25.3 % — LOW (ref 34.5–45)
HCT VFR BLDA CALC: 21.7 % — LOW (ref 34.5–46.5)
HCT VFR BLDA CALC: 22.7 % — LOW (ref 34.5–46.5)
HCT VFR BLDA CALC: 23.2 % — LOW (ref 34.5–46.5)
HCT VFR BLDA CALC: 24.5 % — LOW (ref 34.5–46.5)
HCT VFR BLDA CALC: 24.6 % — LOW (ref 34.5–46.5)
HCT VFR BLDA CALC: 25.9 % — LOW (ref 34.5–46.5)
HCT VFR BLDA CALC: 26.6 % — LOW (ref 34.5–46.5)
HCT VFR BLDA CALC: 27.5 % — LOW (ref 34.5–46.5)
HGB BLD-MCNC: 6.9 G/DL — CRITICAL LOW (ref 11.5–15.5)
HGB BLD-MCNC: 7 G/DL — CRITICAL LOW (ref 11.5–15.5)
HGB BLD-MCNC: 7 G/DL — CRITICAL LOW (ref 11.5–15.5)
HGB BLD-MCNC: 8 G/DL — LOW (ref 11.5–15.5)
HGB BLD-MCNC: 8.1 G/DL — LOW (ref 11.5–15.5)
HGB BLD-MCNC: 8.4 G/DL — LOW (ref 11.5–15.5)
HGB BLD-MCNC: 8.8 G/DL — LOW (ref 11.5–15.5)
HGB BLD-MCNC: 9 G/DL — LOW (ref 11.5–15.5)
HGB BLDA-MCNC: 6.9 G/DL — CRITICAL LOW (ref 11.5–15.5)
HGB BLDA-MCNC: 7.3 G/DL — LOW (ref 11.5–15.5)
HGB BLDA-MCNC: 7.4 G/DL — LOW (ref 11.5–15.5)
HGB BLDA-MCNC: 7.9 G/DL — LOW (ref 11.5–15.5)
HGB BLDA-MCNC: 7.9 G/DL — LOW (ref 11.5–15.5)
HGB BLDA-MCNC: 8.3 G/DL — LOW (ref 11.5–15.5)
HGB BLDA-MCNC: 8.6 G/DL — LOW (ref 11.5–15.5)
HGB BLDA-MCNC: 8.9 G/DL — LOW (ref 11.5–15.5)
IMM GRANULOCYTES # BLD AUTO: 0.04 # — SIGNIFICANT CHANGE UP
IMM GRANULOCYTES # BLD AUTO: 0.05 # — SIGNIFICANT CHANGE UP
IMM GRANULOCYTES # BLD AUTO: 0.07 # — SIGNIFICANT CHANGE UP
IMM GRANULOCYTES # BLD AUTO: 0.09 # — SIGNIFICANT CHANGE UP
IMM GRANULOCYTES NFR BLD AUTO: 0.3 % — SIGNIFICANT CHANGE UP (ref 0–1.5)
IMM GRANULOCYTES NFR BLD AUTO: 0.4 % — SIGNIFICANT CHANGE UP (ref 0–1.5)
IMM GRANULOCYTES NFR BLD AUTO: 0.4 % — SIGNIFICANT CHANGE UP (ref 0–1.5)
IMM GRANULOCYTES NFR BLD AUTO: 0.5 % — SIGNIFICANT CHANGE UP (ref 0–1.5)
INR BLD: 1.01 — SIGNIFICANT CHANGE UP (ref 0.88–1.17)
INR BLD: 1.11 — SIGNIFICANT CHANGE UP (ref 0.88–1.17)
INR BLD: 1.12 — SIGNIFICANT CHANGE UP (ref 0.88–1.17)
INR BLD: 1.14 — SIGNIFICANT CHANGE UP (ref 0.88–1.17)
INR BLD: 1.19 — HIGH (ref 0.88–1.17)
INR BLD: 1.19 — HIGH (ref 0.88–1.17)
INR BLD: 1.42 — HIGH (ref 0.88–1.17)
INR BLD: 1.69 — HIGH (ref 0.88–1.17)
LACTATE BLDA-SCNC: 0.8 MMOL/L — SIGNIFICANT CHANGE UP (ref 0.5–2)
LACTATE BLDA-SCNC: 0.9 MMOL/L — SIGNIFICANT CHANGE UP (ref 0.5–2)
LACTATE BLDA-SCNC: 1.8 MMOL/L — SIGNIFICANT CHANGE UP (ref 0.5–2)
LACTATE BLDA-SCNC: 2.1 MMOL/L — HIGH (ref 0.5–2)
LACTATE BLDA-SCNC: 2.4 MMOL/L — HIGH (ref 0.5–2)
LACTATE BLDA-SCNC: 2.8 MMOL/L — HIGH (ref 0.5–2)
LACTATE SERPL-SCNC: 1.6 MMOL/L — SIGNIFICANT CHANGE UP (ref 0.5–2)
LACTATE SERPL-SCNC: 2.6 MMOL/L — HIGH (ref 0.5–2)
LACTATE SERPL-SCNC: 4.8 MMOL/L — CRITICAL HIGH (ref 0.5–2)
LDH SERPL L TO P-CCNC: 654 U/L — HIGH (ref 135–225)
LYMPHOCYTES # BLD AUTO: 0.4 K/UL — LOW (ref 1–3.3)
LYMPHOCYTES # BLD AUTO: 0.7 K/UL — LOW (ref 1–3.3)
LYMPHOCYTES # BLD AUTO: 1 K/UL — SIGNIFICANT CHANGE UP (ref 1–3.3)
LYMPHOCYTES # BLD AUTO: 1.37 K/UL — SIGNIFICANT CHANGE UP (ref 1–3.3)
LYMPHOCYTES # BLD AUTO: 3.2 % — LOW (ref 13–44)
LYMPHOCYTES # BLD AUTO: 5.4 % — LOW (ref 13–44)
LYMPHOCYTES # BLD AUTO: 6 % — LOW (ref 13–44)
LYMPHOCYTES # BLD AUTO: 7.4 % — LOW (ref 13–44)
LYMPHOCYTES NFR SPEC AUTO: 2.6 % — LOW (ref 13–44)
MAGNESIUM SERPL-MCNC: 1.4 MG/DL — LOW (ref 1.6–2.6)
MAGNESIUM SERPL-MCNC: 1.8 MG/DL — SIGNIFICANT CHANGE UP (ref 1.6–2.6)
MCHC RBC-ENTMCNC: 28.5 PG — SIGNIFICANT CHANGE UP (ref 27–34)
MCHC RBC-ENTMCNC: 28.8 PG — SIGNIFICANT CHANGE UP (ref 27–34)
MCHC RBC-ENTMCNC: 29.8 PG — SIGNIFICANT CHANGE UP (ref 27–34)
MCHC RBC-ENTMCNC: 29.8 PG — SIGNIFICANT CHANGE UP (ref 27–34)
MCHC RBC-ENTMCNC: 30.1 PG — SIGNIFICANT CHANGE UP (ref 27–34)
MCHC RBC-ENTMCNC: 30.4 PG — SIGNIFICANT CHANGE UP (ref 27–34)
MCHC RBC-ENTMCNC: 30.7 PG — SIGNIFICANT CHANGE UP (ref 27–34)
MCHC RBC-ENTMCNC: 30.7 PG — SIGNIFICANT CHANGE UP (ref 27–34)
MCHC RBC-ENTMCNC: 32.9 % — SIGNIFICANT CHANGE UP (ref 32–36)
MCHC RBC-ENTMCNC: 33.7 % — SIGNIFICANT CHANGE UP (ref 32–36)
MCHC RBC-ENTMCNC: 34.3 % — SIGNIFICANT CHANGE UP (ref 32–36)
MCHC RBC-ENTMCNC: 34.8 % — SIGNIFICANT CHANGE UP (ref 32–36)
MCHC RBC-ENTMCNC: 34.9 % — SIGNIFICANT CHANGE UP (ref 32–36)
MCHC RBC-ENTMCNC: 35 % — SIGNIFICANT CHANGE UP (ref 32–36)
MCHC RBC-ENTMCNC: 35 % — SIGNIFICANT CHANGE UP (ref 32–36)
MCHC RBC-ENTMCNC: 35.6 % — SIGNIFICANT CHANGE UP (ref 32–36)
MCV RBC AUTO: 85.1 FL — SIGNIFICANT CHANGE UP (ref 80–100)
MCV RBC AUTO: 85.4 FL — SIGNIFICANT CHANGE UP (ref 80–100)
MCV RBC AUTO: 86.3 FL — SIGNIFICANT CHANGE UP (ref 80–100)
MCV RBC AUTO: 86.6 FL — SIGNIFICANT CHANGE UP (ref 80–100)
MCV RBC AUTO: 86.6 FL — SIGNIFICANT CHANGE UP (ref 80–100)
MCV RBC AUTO: 86.9 FL — SIGNIFICANT CHANGE UP (ref 80–100)
MCV RBC AUTO: 87.1 FL — SIGNIFICANT CHANGE UP (ref 80–100)
MCV RBC AUTO: 87.7 FL — SIGNIFICANT CHANGE UP (ref 80–100)
METAMYELOCYTES # FLD: 0.9 % — SIGNIFICANT CHANGE UP (ref 0–1)
MICROCYTES BLD QL: SLIGHT — SIGNIFICANT CHANGE UP
MONOCYTES # BLD AUTO: 0.65 K/UL — SIGNIFICANT CHANGE UP (ref 0–0.9)
MONOCYTES # BLD AUTO: 0.68 K/UL — SIGNIFICANT CHANGE UP (ref 0–0.9)
MONOCYTES # BLD AUTO: 0.77 K/UL — SIGNIFICANT CHANGE UP (ref 0–0.9)
MONOCYTES # BLD AUTO: 1.05 K/UL — HIGH (ref 0–0.9)
MONOCYTES NFR BLD AUTO: 3.5 % — SIGNIFICANT CHANGE UP (ref 2–14)
MONOCYTES NFR BLD AUTO: 5.7 % — SIGNIFICANT CHANGE UP (ref 2–14)
MONOCYTES NFR BLD AUTO: 5.8 % — SIGNIFICANT CHANGE UP (ref 2–14)
MONOCYTES NFR BLD AUTO: 6.1 % — SIGNIFICANT CHANGE UP (ref 2–14)
MONOCYTES NFR BLD: 1.7 % — LOW (ref 2–9)
MYELOCYTES NFR BLD: 0 % — SIGNIFICANT CHANGE UP (ref 0–0)
NEUTROPHIL AB SER-ACNC: 77.4 % — HIGH (ref 43–77)
NEUTROPHILS # BLD AUTO: 10.3 K/UL — HIGH (ref 1.8–7.4)
NEUTROPHILS # BLD AUTO: 11.44 K/UL — HIGH (ref 1.8–7.4)
NEUTROPHILS # BLD AUTO: 15.96 K/UL — HIGH (ref 1.8–7.4)
NEUTROPHILS # BLD AUTO: 16.72 K/UL — HIGH (ref 1.8–7.4)
NEUTROPHILS NFR BLD AUTO: 86.2 % — HIGH (ref 43–77)
NEUTROPHILS NFR BLD AUTO: 87.8 % — HIGH (ref 43–77)
NEUTROPHILS NFR BLD AUTO: 90.2 % — HIGH (ref 43–77)
NEUTROPHILS NFR BLD AUTO: 90.6 % — HIGH (ref 43–77)
NEUTS BAND # BLD: 17.4 % — HIGH (ref 0–6)
NRBC # FLD: 0 — SIGNIFICANT CHANGE UP
NRBC # FLD: 0.02 — SIGNIFICANT CHANGE UP
NRBC # FLD: 0.02 — SIGNIFICANT CHANGE UP
NRBC # FLD: 0.03 — SIGNIFICANT CHANGE UP
OTHER - HEMATOLOGY %: 0 — SIGNIFICANT CHANGE UP
OVALOCYTES BLD QL SMEAR: SLIGHT — SIGNIFICANT CHANGE UP
PCO2 BLDA: 30 MMHG — LOW (ref 32–48)
PCO2 BLDA: 32 MMHG — SIGNIFICANT CHANGE UP (ref 32–48)
PCO2 BLDA: 34 MMHG — SIGNIFICANT CHANGE UP (ref 32–48)
PCO2 BLDA: 35 MMHG — SIGNIFICANT CHANGE UP (ref 32–48)
PCO2 BLDA: 37 MMHG — SIGNIFICANT CHANGE UP (ref 32–48)
PCO2 BLDA: 38 MMHG — SIGNIFICANT CHANGE UP (ref 32–48)
PCO2 BLDA: 38 MMHG — SIGNIFICANT CHANGE UP (ref 32–48)
PCO2 BLDA: 39 MMHG — SIGNIFICANT CHANGE UP (ref 32–48)
PCO2 BLDA: 40 MMHG — SIGNIFICANT CHANGE UP (ref 32–48)
PCO2 BLDA: 43 MMHG — SIGNIFICANT CHANGE UP (ref 32–48)
PH BLDA: 7.29 PH — LOW (ref 7.35–7.45)
PH BLDA: 7.29 PH — LOW (ref 7.35–7.45)
PH BLDA: 7.3 PH — LOW (ref 7.35–7.45)
PH BLDA: 7.31 PH — LOW (ref 7.35–7.45)
PH BLDA: 7.35 PH — SIGNIFICANT CHANGE UP (ref 7.35–7.45)
PH BLDA: 7.36 PH — SIGNIFICANT CHANGE UP (ref 7.35–7.45)
PH BLDA: 7.4 PH — SIGNIFICANT CHANGE UP (ref 7.35–7.45)
PHOSPHATE SERPL-MCNC: 5.7 MG/DL — HIGH (ref 2.5–4.5)
PHOSPHATE SERPL-MCNC: 6.2 MG/DL — HIGH (ref 2.5–4.5)
PLATELET # BLD AUTO: 101 K/UL — LOW (ref 150–400)
PLATELET # BLD AUTO: 116 K/UL — LOW (ref 150–400)
PLATELET # BLD AUTO: 124 K/UL — LOW (ref 150–400)
PLATELET # BLD AUTO: 129 K/UL — LOW (ref 150–400)
PLATELET # BLD AUTO: 69 K/UL — LOW (ref 150–400)
PLATELET # BLD AUTO: 72 K/UL — LOW (ref 150–400)
PLATELET # BLD AUTO: 75 K/UL — LOW (ref 150–400)
PLATELET # BLD AUTO: 79 K/UL — LOW (ref 150–400)
PLATELET COUNT - ESTIMATE: SIGNIFICANT CHANGE UP
PMV BLD: 10.4 FL — SIGNIFICANT CHANGE UP (ref 7–13)
PMV BLD: 10.6 FL — SIGNIFICANT CHANGE UP (ref 7–13)
PMV BLD: 10.7 FL — SIGNIFICANT CHANGE UP (ref 7–13)
PMV BLD: 11 FL — SIGNIFICANT CHANGE UP (ref 7–13)
PMV BLD: 11.1 FL — SIGNIFICANT CHANGE UP (ref 7–13)
PMV BLD: 11.4 FL — SIGNIFICANT CHANGE UP (ref 7–13)
PMV BLD: 11.5 FL — SIGNIFICANT CHANGE UP (ref 7–13)
PMV BLD: 11.7 FL — SIGNIFICANT CHANGE UP (ref 7–13)
PO2 BLDA: 111 MMHG — HIGH (ref 83–108)
PO2 BLDA: 131 MMHG — HIGH (ref 83–108)
PO2 BLDA: 134 MMHG — HIGH (ref 83–108)
PO2 BLDA: 174 MMHG — HIGH (ref 83–108)
PO2 BLDA: 232 MMHG — HIGH (ref 83–108)
PO2 BLDA: 252 MMHG — HIGH (ref 83–108)
PO2 BLDA: 292 MMHG — HIGH (ref 83–108)
PO2 BLDA: 334 MMHG — HIGH (ref 83–108)
PO2 BLDA: 345 MMHG — HIGH (ref 83–108)
PO2 BLDA: 369 MMHG — HIGH (ref 83–108)
POIKILOCYTOSIS BLD QL AUTO: SLIGHT — SIGNIFICANT CHANGE UP
POTASSIUM BLDA-SCNC: 4.6 MMOL/L — HIGH (ref 3.4–4.5)
POTASSIUM BLDA-SCNC: 4.6 MMOL/L — HIGH (ref 3.4–4.5)
POTASSIUM BLDA-SCNC: 5 MMOL/L — HIGH (ref 3.4–4.5)
POTASSIUM BLDA-SCNC: 5 MMOL/L — HIGH (ref 3.4–4.5)
POTASSIUM BLDA-SCNC: 5.6 MMOL/L — HIGH (ref 3.4–4.5)
POTASSIUM BLDA-SCNC: 5.8 MMOL/L — HIGH (ref 3.4–4.5)
POTASSIUM BLDA-SCNC: 5.8 MMOL/L — HIGH (ref 3.4–4.5)
POTASSIUM BLDA-SCNC: 5.9 MMOL/L — HIGH (ref 3.4–4.5)
POTASSIUM BLDA-SCNC: 6.4 MMOL/L — CRITICAL HIGH (ref 3.4–4.5)
POTASSIUM SERPL-MCNC: 4.2 MMOL/L — SIGNIFICANT CHANGE UP (ref 3.5–5.3)
POTASSIUM SERPL-MCNC: 4.7 MMOL/L — SIGNIFICANT CHANGE UP (ref 3.5–5.3)
POTASSIUM SERPL-MCNC: 4.9 MMOL/L — SIGNIFICANT CHANGE UP (ref 3.5–5.3)
POTASSIUM SERPL-MCNC: 5.4 MMOL/L — HIGH (ref 3.5–5.3)
POTASSIUM SERPL-MCNC: 5.4 MMOL/L — HIGH (ref 3.5–5.3)
POTASSIUM SERPL-MCNC: 6.4 MMOL/L — CRITICAL HIGH (ref 3.5–5.3)
POTASSIUM SERPL-SCNC: 4.2 MMOL/L — SIGNIFICANT CHANGE UP (ref 3.5–5.3)
POTASSIUM SERPL-SCNC: 4.7 MMOL/L — SIGNIFICANT CHANGE UP (ref 3.5–5.3)
POTASSIUM SERPL-SCNC: 4.9 MMOL/L — SIGNIFICANT CHANGE UP (ref 3.5–5.3)
POTASSIUM SERPL-SCNC: 5.4 MMOL/L — HIGH (ref 3.5–5.3)
POTASSIUM SERPL-SCNC: 5.4 MMOL/L — HIGH (ref 3.5–5.3)
POTASSIUM SERPL-SCNC: 6.4 MMOL/L — CRITICAL HIGH (ref 3.5–5.3)
PROMYELOCYTES # FLD: 0 % — SIGNIFICANT CHANGE UP (ref 0–0)
PROT SERPL-MCNC: 4.3 G/DL — LOW (ref 6–8.3)
PROT SERPL-MCNC: 4.6 G/DL — LOW (ref 6–8.3)
PROT SERPL-MCNC: 4.7 G/DL — LOW (ref 6–8.3)
PROTHROM AB SERPL-ACNC: 11.6 SEC — SIGNIFICANT CHANGE UP (ref 9.8–13.1)
PROTHROM AB SERPL-ACNC: 12.4 SEC — SIGNIFICANT CHANGE UP (ref 9.8–13.1)
PROTHROM AB SERPL-ACNC: 12.5 SEC — SIGNIFICANT CHANGE UP (ref 9.8–13.1)
PROTHROM AB SERPL-ACNC: 13.1 SEC — SIGNIFICANT CHANGE UP (ref 9.8–13.1)
PROTHROM AB SERPL-ACNC: 13.3 SEC — HIGH (ref 9.8–13.1)
PROTHROM AB SERPL-ACNC: 13.7 SEC — HIGH (ref 9.8–13.1)
PROTHROM AB SERPL-ACNC: 16.4 SEC — HIGH (ref 9.8–13.1)
PROTHROM AB SERPL-ACNC: 19 SEC — HIGH (ref 9.8–13.1)
RBC # BLD: 2.28 M/UL — LOW (ref 3.8–5.2)
RBC # BLD: 2.35 M/UL — LOW (ref 3.8–5.2)
RBC # BLD: 2.4 M/UL — LOW (ref 3.8–5.2)
RBC # BLD: 2.63 M/UL — LOW (ref 3.8–5.2)
RBC # BLD: 2.82 M/UL — LOW (ref 3.8–5.2)
RBC # BLD: 2.84 M/UL — LOW (ref 3.8–5.2)
RBC # BLD: 2.92 M/UL — LOW (ref 3.8–5.2)
RBC # BLD: 2.93 M/UL — LOW (ref 3.8–5.2)
RBC # FLD: 14 % — SIGNIFICANT CHANGE UP (ref 10.3–14.5)
RBC # FLD: 14.3 % — SIGNIFICANT CHANGE UP (ref 10.3–14.5)
RBC # FLD: 14.5 % — SIGNIFICANT CHANGE UP (ref 10.3–14.5)
RBC # FLD: 14.5 % — SIGNIFICANT CHANGE UP (ref 10.3–14.5)
RBC # FLD: 14.6 % — HIGH (ref 10.3–14.5)
RBC # FLD: 15.3 % — HIGH (ref 10.3–14.5)
REVIEW TO FOLLOW: YES — SIGNIFICANT CHANGE UP
SAO2 % BLDA: 98.4 % — SIGNIFICANT CHANGE UP (ref 95–99)
SAO2 % BLDA: 98.6 % — SIGNIFICANT CHANGE UP (ref 95–99)
SAO2 % BLDA: 98.9 % — SIGNIFICANT CHANGE UP (ref 95–99)
SAO2 % BLDA: 99.2 % — HIGH (ref 95–99)
SAO2 % BLDA: 99.4 % — HIGH (ref 95–99)
SAO2 % BLDA: 99.5 % — HIGH (ref 95–99)
SAO2 % BLDA: 99.6 % — HIGH (ref 95–99)
SAO2 % BLDA: 99.8 % — HIGH (ref 95–99)
SAO2 % BLDA: 99.8 % — HIGH (ref 95–99)
SAO2 % BLDA: 99.9 % — HIGH (ref 95–99)
SODIUM BLDA-SCNC: 127 MMOL/L — LOW (ref 136–146)
SODIUM BLDA-SCNC: 129 MMOL/L — LOW (ref 136–146)
SODIUM BLDA-SCNC: 131 MMOL/L — LOW (ref 136–146)
SODIUM BLDA-SCNC: 132 MMOL/L — LOW (ref 136–146)
SODIUM BLDA-SCNC: 133 MMOL/L — LOW (ref 136–146)
SODIUM BLDA-SCNC: 133 MMOL/L — LOW (ref 136–146)
SODIUM SERPL-SCNC: 133 MMOL/L — LOW (ref 135–145)
SODIUM SERPL-SCNC: 134 MMOL/L — LOW (ref 135–145)
SODIUM SERPL-SCNC: 135 MMOL/L — SIGNIFICANT CHANGE UP (ref 135–145)
SODIUM SERPL-SCNC: 136 MMOL/L — SIGNIFICANT CHANGE UP (ref 135–145)
SODIUM SERPL-SCNC: 137 MMOL/L — SIGNIFICANT CHANGE UP (ref 135–145)
SODIUM SERPL-SCNC: 137 MMOL/L — SIGNIFICANT CHANGE UP (ref 135–145)
URATE SERPL-MCNC: 5.2 MG/DL — SIGNIFICANT CHANGE UP (ref 2.5–7)
VARIANT LYMPHS # BLD: 0 % — SIGNIFICANT CHANGE UP
WBC # BLD: 11.07 K/UL — HIGH (ref 3.8–10.5)
WBC # BLD: 11.73 K/UL — HIGH (ref 3.8–10.5)
WBC # BLD: 12.67 K/UL — HIGH (ref 3.8–10.5)
WBC # BLD: 17.1 K/UL — HIGH (ref 3.8–10.5)
WBC # BLD: 18.46 K/UL — HIGH (ref 3.8–10.5)
WBC # BLD: 18.46 K/UL — HIGH (ref 3.8–10.5)
WBC # BLD: 18.5 K/UL — HIGH (ref 3.8–10.5)
WBC # BLD: 22.7 K/UL — HIGH (ref 3.8–10.5)
WBC # FLD AUTO: 11.07 K/UL — HIGH (ref 3.8–10.5)
WBC # FLD AUTO: 11.73 K/UL — HIGH (ref 3.8–10.5)
WBC # FLD AUTO: 12.67 K/UL — HIGH (ref 3.8–10.5)
WBC # FLD AUTO: 17.1 K/UL — HIGH (ref 3.8–10.5)
WBC # FLD AUTO: 18.46 K/UL — HIGH (ref 3.8–10.5)
WBC # FLD AUTO: 18.46 K/UL — HIGH (ref 3.8–10.5)
WBC # FLD AUTO: 18.5 K/UL — HIGH (ref 3.8–10.5)
WBC # FLD AUTO: 22.7 K/UL — HIGH (ref 3.8–10.5)

## 2018-09-11 PROCEDURE — 99254 IP/OBS CNSLTJ NEW/EST MOD 60: CPT | Mod: GC

## 2018-09-11 PROCEDURE — 49060 DRAIN OPEN RETROPERI ABSCESS: CPT | Mod: GC

## 2018-09-11 PROCEDURE — 49002 REOPENING OF ABDOMEN: CPT | Mod: 59,GC

## 2018-09-11 PROCEDURE — 88342 IMHCHEM/IMCYTCHM 1ST ANTB: CPT | Mod: 26

## 2018-09-11 PROCEDURE — 71045 X-RAY EXAM CHEST 1 VIEW: CPT | Mod: 26

## 2018-09-11 PROCEDURE — 74018 RADEX ABDOMEN 1 VIEW: CPT | Mod: 26

## 2018-09-11 PROCEDURE — 88307 TISSUE EXAM BY PATHOLOGIST: CPT | Mod: 26

## 2018-09-11 PROCEDURE — 58150 TOTAL HYSTERECTOMY: CPT

## 2018-09-11 RX ORDER — OXYCODONE HYDROCHLORIDE 5 MG/1
5 TABLET ORAL
Qty: 0 | Refills: 0 | Status: DISCONTINUED | OUTPATIENT
Start: 2018-09-11 | End: 2018-09-11

## 2018-09-11 RX ORDER — SODIUM CHLORIDE 9 MG/ML
500 INJECTION, SOLUTION INTRAVENOUS ONCE
Qty: 0 | Refills: 0 | Status: DISCONTINUED | OUTPATIENT
Start: 2018-09-11 | End: 2018-09-11

## 2018-09-11 RX ORDER — MIDAZOLAM HYDROCHLORIDE 1 MG/ML
2 INJECTION, SOLUTION INTRAMUSCULAR; INTRAVENOUS ONCE
Qty: 0 | Refills: 0 | Status: DISCONTINUED | OUTPATIENT
Start: 2018-09-11 | End: 2018-09-11

## 2018-09-11 RX ORDER — MAGNESIUM SULFATE 500 MG/ML
2 VIAL (ML) INJECTION ONCE
Qty: 0 | Refills: 0 | Status: COMPLETED | OUTPATIENT
Start: 2018-09-11 | End: 2018-09-11

## 2018-09-11 RX ORDER — ALBUMIN HUMAN 25 %
250 VIAL (ML) INTRAVENOUS ONCE
Qty: 0 | Refills: 0 | Status: DISCONTINUED | OUTPATIENT
Start: 2018-09-11 | End: 2018-09-11

## 2018-09-11 RX ORDER — PROPOFOL 10 MG/ML
20 INJECTION, EMULSION INTRAVENOUS
Qty: 1000 | Refills: 0 | Status: DISCONTINUED | OUTPATIENT
Start: 2018-09-11 | End: 2018-09-12

## 2018-09-11 RX ORDER — CHLORHEXIDINE GLUCONATE 213 G/1000ML
15 SOLUTION TOPICAL
Qty: 0 | Refills: 0 | Status: DISCONTINUED | OUTPATIENT
Start: 2018-09-11 | End: 2018-09-12

## 2018-09-11 RX ORDER — PROPOFOL 10 MG/ML
20 INJECTION, EMULSION INTRAVENOUS
Qty: 1000 | Refills: 0 | Status: DISCONTINUED | OUTPATIENT
Start: 2018-09-11 | End: 2018-09-11

## 2018-09-11 RX ORDER — FENTANYL CITRATE 50 UG/ML
2 INJECTION INTRAVENOUS
Qty: 2500 | Refills: 0 | Status: DISCONTINUED | OUTPATIENT
Start: 2018-09-11 | End: 2018-09-12

## 2018-09-11 RX ORDER — SODIUM CHLORIDE 9 MG/ML
1000 INJECTION, SOLUTION INTRAVENOUS ONCE
Qty: 0 | Refills: 0 | Status: COMPLETED | OUTPATIENT
Start: 2018-09-11 | End: 2018-09-11

## 2018-09-11 RX ORDER — CHLORHEXIDINE GLUCONATE 213 G/1000ML
1 SOLUTION TOPICAL
Qty: 0 | Refills: 0 | Status: DISCONTINUED | OUTPATIENT
Start: 2018-09-11 | End: 2018-09-13

## 2018-09-11 RX ADMIN — FENTANYL CITRATE 2 MICROGRAM(S)/KG/HR: 50 INJECTION INTRAVENOUS at 16:00

## 2018-09-11 RX ADMIN — Medication 0.2 MILLIGRAM(S): at 02:35

## 2018-09-11 RX ADMIN — SODIUM CHLORIDE 125 MILLILITER(S): 9 INJECTION, SOLUTION INTRAVENOUS at 00:00

## 2018-09-11 RX ADMIN — CHLORHEXIDINE GLUCONATE 15 MILLILITER(S): 213 SOLUTION TOPICAL at 18:35

## 2018-09-11 RX ADMIN — FENTANYL CITRATE 18.54 MICROGRAM(S)/KG/HR: 50 INJECTION INTRAVENOUS at 19:40

## 2018-09-11 RX ADMIN — PROPOFOL 11.12 MICROGRAM(S)/KG/MIN: 10 INJECTION, EMULSION INTRAVENOUS at 21:01

## 2018-09-11 RX ADMIN — FENTANYL CITRATE 2 MICROGRAM(S)/KG/HR: 50 INJECTION INTRAVENOUS at 19:40

## 2018-09-11 RX ADMIN — SODIUM CHLORIDE 125 MILLILITER(S): 9 INJECTION, SOLUTION INTRAVENOUS at 19:40

## 2018-09-11 RX ADMIN — SODIUM CHLORIDE 1000 MILLILITER(S): 9 INJECTION, SOLUTION INTRAVENOUS at 02:52

## 2018-09-11 RX ADMIN — FENTANYL CITRATE 9.27 MICROGRAM(S)/KG/HR: 50 INJECTION INTRAVENOUS at 15:59

## 2018-09-11 RX ADMIN — HYDROMORPHONE HYDROCHLORIDE 0.5 MILLIGRAM(S): 2 INJECTION INTRAMUSCULAR; INTRAVENOUS; SUBCUTANEOUS at 00:00

## 2018-09-11 RX ADMIN — Medication 50 GRAM(S): at 18:36

## 2018-09-11 RX ADMIN — MIDAZOLAM HYDROCHLORIDE 2 MILLIGRAM(S): 1 INJECTION, SOLUTION INTRAMUSCULAR; INTRAVENOUS at 18:40

## 2018-09-11 RX ADMIN — SODIUM CHLORIDE 125 MILLILITER(S): 9 INJECTION, SOLUTION INTRAVENOUS at 05:05

## 2018-09-11 RX ADMIN — PROPOFOL 11.12 MICROGRAM(S)/KG/MIN: 10 INJECTION, EMULSION INTRAVENOUS at 15:59

## 2018-09-11 RX ADMIN — PROPOFOL 11.12 MICROGRAM(S)/KG/MIN: 10 INJECTION, EMULSION INTRAVENOUS at 19:40

## 2018-09-11 RX ADMIN — OXYCODONE HYDROCHLORIDE 5 MILLIGRAM(S): 5 TABLET ORAL at 06:07

## 2018-09-11 RX ADMIN — SODIUM CHLORIDE 125 MILLILITER(S): 9 INJECTION, SOLUTION INTRAVENOUS at 15:59

## 2018-09-11 NOTE — CHART NOTE - NSCHARTNOTEFT_GEN_A_CORE
OB Attg  Pt reports feeling warm but denies CP/SOB/Nausea and vomiting.  Vital Signs Last 24 Hrs  T(C): 36.9 (11 Sep 2018 01:00), Max: 36.9 (11 Sep 2018 01:00)  T(F): 98.4 (11 Sep 2018 01:00), Max: 98.4 (11 Sep 2018 01:00)  HR: 101 (11 Sep 2018 02:45) (89 - 125)  BP: 94/49 (11 Sep 2018 02:30) (91/63 - 152/62)  BP(mean): 59 (11 Sep 2018 02:30) (59 - 95)  RR: 13 (11 Sep 2018 02:45) (13 - 24)  SpO2: 96% (11 Sep 2018 02:45) (95% - 100%)    I&O's Detail    09 Sep 2018 07:01  -  10 Sep 2018 07:00  --------------------------------------------------------  IN:    Lactated Ringers IV Bolus: 1000 mL    lactated ringers.: 3000 mL  Total IN: 4000 mL    OUT:  Total OUT: 0 mL    Total NET: 4000 mL      10 Sep 2018 07:01  -  11 Sep 2018 02:58  --------------------------------------------------------  IN:    Lactated Ringers IV Bolus: 1500 mL    lactated ringers.: 1375 mL    lactated ringers.: 125 mL    Other: 2000 mL    oxytocin Infusion: 625 mL  Total IN: 5625 mL    OUT:    Estimated Blood Loss: 1400 mL    Indwelling Catheter - Urethral: 1220 mL  Total OUT: 2620 mL    Total NET: 3005 mL    Abd--fundus firm  Pelvic--total since OR of blood expressed per vagina 500-600cc  Previous lung scan and FAST scan findings documented  Ext--Venodynes      Lactate, Blood (09.11.18 @ 01:20)    Lactate, Blood: 4.8 mmol/L  Complete Blood Count (09.11.18 @ 00:45)                             8.1    22.70 )-----------( 101      ( 11 Sep 2018 00:45 )             24.6   09-11    136  |  102  |  12  ----------------------------<  93  4.2   |  17<L>  |  1.19    Ca    7.9<L>      11 Sep 2018 01:20    TPro  4.7<L>  /  Alb  2.4<L>  /  TBili  2.6<H>  /  DBili  x   /  AST  44<H>  /  ALT  10  /  AlkPhos  89  09-11    PPH due to intermittent atomy of MARCY with oligouria, increased coags and creatinine    No evidence of intraabdominal bleeding  will resuscitate with IVF-1L bolus and possible albumin  Methergine x 1 dose given IM.  Labile BPs prior to delivery.  although suspicion low for preeclampsia and prot/cr ratio wnl, given inc Creatinine will administer Carboprost as uterotonic if needed. Tordaol/NSAIDS held  Rpt labs in 1-2hrs  SICU consulted--input appreciated given during huddle

## 2018-09-11 NOTE — BRIEF OPERATIVE NOTE - PROCEDURE
<<-----Click on this checkbox to enter Procedure Exploratory laparotomy  09/11/2018    Active  AMARILIS

## 2018-09-11 NOTE — CONSULT NOTE ADULT - ASSESSMENT
ASSESSMENT:  37y Female POD1 from primary cesarian section, POD 0 from re-op for hemoperitoneum, now s/p cesarian hysterectomy with total EBL 7L. Pt remains intubated and sedated.     PLAN:   Neurologic:       Respiratory:         Cardiovascular:         Gastrointestinal/Nutrition:         Renal/Genitourinary:   -Strict Is&Os  -replete lytes as necessary      Hematologic:         Infectious Disease: CHARLES        Tubes/Lines/Drains:         Endocrine: CHARLES        Disposition: SICU      Critical Care Diagnoses: ASSESSMENT:  37y Female POD1 from primary cesarian section, POD 0 from re-op for hemoperitoneum, now s/p cesarian hysterectomy with total EBL 7L. Pt remains intubated and sedated.     PLAN:   Neurologic:   -sedation via propofol and fentanyl gtt     Respiratory:   -Volume AC 12/450/5/40  -f/u ABG      Cardiovascular: s/p 9u pRBCs, 7u FFP, 4u platelets 4u cryo. Hemodynamically stable during OR, never required pressors  -hx of SVT s/p ablation in 2011, no subsequent episodes, no home medications         Gastrointestinal/Nutrition:   -NPO      Renal/Genitourinary: pt anuric overnight,  in OR  -Strict Is&Os  -Hyperkalemia intraop, K 6.2  -f/u BMP  -replete lytes as necessary      Hematologic:   -pt initially thought to be in DIC, preop plt 72, PT:13.7, PTT:65.4, INR: 1.19  -Coags have since normalized: PT:12.5, PTT:29.4. INR:1.12  -Trend H/H      Infectious Disease: CHARLES        Tubes/Lines/Drains:   2 R PIVs  R radial A line  [place]        Endocrine: CHARLES        Disposition: SICU      Critical Care Diagnoses: ASSESSMENT:  37y Female POD1 from primary cesarian section, POD 0 from re-op for hemoperitoneum, now s/p cesarian hysterectomy with total EBL 7L. Pt remains intubated and sedated.     PLAN:   Neurologic:   -sedation via propofol and fentanyl gtt     Respiratory:   -Volume AC 12/450/5/40  -f/u ABG  -will reassess for extubation in AM      Cardiovascular: s/p 9u pRBCs, 7u FFP, 4u platelets 4u cryo. Hemodynamically stable during OR, never required pressors  -hx of SVT s/p ablation in 2011, no subsequent episodes, no home medications         Gastrointestinal/Nutrition:   -NPO      Renal/Genitourinary: pt anuric overnight,  in OR  -Strict Is&Os  -Hyperkalemia intraop, K 6.2  -f/u BMP  -replete lytes as necessary      Hematologic:   -receiving 2units pRBCs   -f/u post-transfusion H/H  -pt initially thought to be in DIC, preop plt 72, PT:13.7, PTT:65.4, INR: 1.19  -Coags have since normalized: PT:12.5, PTT:29.4. INR:1.12  -Trend H/H      Infectious Disease: CHRALES        Tubes/Lines/Drains:   2 R PIVs  R radial A line  [place]        Endocrine: CHARLES  -monitor glucose via BMP      Disposition: SICU ASSESSMENT:  37y Female POD1 from primary cesarian section, POD 0 from re-op for hemoperitoneum, now s/p cesarian hysterectomy with total EBL 7L. Pt remains intubated and sedated.     PLAN:   Neurologic:   -sedation via propofol and fentanyl gtt     Respiratory:   -Volume AC 12/450/5/40  -f/u ABG  -will reassess for extubation in AM  -continue to monitor as lung sounds were coarse upon arrival to SICU      Cardiovascular: s/p 9u pRBCs, 7u FFP, 4u platelets 4u cryo. Hemodynamically stable during OR, never required pressors  -hx of SVT s/p ablation in 2011, no subsequent episodes, no home medications         Gastrointestinal/Nutrition:   -NPO      Renal/Genitourinary: pt anuric overnight,  in OR  -Strict Is&Os  -Hyperkalemia intraop, K 6.2  -f/u BMP  -replete lytes as necessary      Hematologic:   -receiving 2units pRBCs   -f/u post-transfusion H/H  -pt initially thought to be in DIC, preop plt 72, PT:13.7, PTT:65.4, INR: 1.19  -Coags have since normalized: PT:12.5, PTT:29.4. INR:1.12  -Trend H/H      Infectious Disease: CHARLES  -monitor temps      Tubes/Lines/Drains:   2 R PIVs  R radial A line  [place]        Endocrine: CHARLES  -monitor glucose via BMP      Disposition: SICU

## 2018-09-11 NOTE — CONSULT NOTE ADULT - SUBJECTIVE AND OBJECTIVE BOX
SICU Consultation Note  =====================================================  37F PMHx of SVT s/p ablation therapy now s/p emergent C/S for prolonged labor with concern for post partum hemorrhage verus DIC. Patient currently hemodynamically stable in OB PACU, mentating with minimal pain and minimal further blood loss. SICU called to bedside to evaluate patient. Patient seen at bedside with OB team, reports no chest pain, no shortness of breath, no nausea, vomiting, diarrhea and moderate, but controlled abdominal pain. Minimal further vaginal bleeding. Per report, patient was found to have 2-3 blood-soaked pads and chucks which have now been cleaned.     Per discussion with OB team, intraoperative blood loss was average for case duration (~800-900mL), with Methergine given once for uterine atony with improvement of tone. Post operative vaginal blood loss was significant with approximately an additional 500-700mL of blood lost. SICU was consulted for associated ALANIS with oliguria as well as concern for DIC in setting of downtrending platelets, H/H, fibrinogen, and uptrending INR.    Allergies: Omnipaque 140 (Anaphylaxis)  penicillin (Anaphylaxis)  Tylenol (Anaphylaxis)  Zithromax Z-Alessandro (Anaphylaxis)    PAST MEDICAL & SURGICAL HISTORY:  Vasovagal syncope  Supraventricular dysrhythmia  Tendinopathy of left biceps tendon    FAMILY HISTORY: non contributory    SOCIAL HISTORY: denies toxic habits    ADVANCE DIRECTIVES: Full code    REVIEW OF SYSTEMS:  ***  General: anxious about post op course, baby doing well  Skin/Breast: Non-Contributory  Ophthalmologic: Non-Contributory  ENMT: Non-Contributory  Respiratory and Thorax: No SOB, No CP  Cardiovascular: No palpitations  Gastrointestinal: abdominal pain  Genitourinary: Non-Contributory  Musculoskeletal: Non-Contributory  Neurological: No weakness, numbness  Psychiatric: Non-Contributory  Hematology/Lymphatics: Non-Contributory  Endocrine: Non-Contributory  Allergic/Immunologic: Non-Contributory    HOME MEDICATIONS: reviewed    CURRENT MEDICATIONS: reviewed    VITAL SIGNS, INS/OUTS (last 24 hours):  --------------------------------------------------------------------------------------  Vital Signs Last 24 Hrs  T(C): 36.1, Max: 37.8   T(F): 96.9, Max: 100   HR: 84 (65-86)  BP: 113/52 (91/63 - 152/62)  BP(mean): 67  (59 - 95)  RR: 12  (12 - 26)  SpO2: 97%  (95% - 100%)    I&O  00:00 - 03:00 - UOP 0    --------------------------------------------------------------------------------------    EXAM  NEUROLOGY  RASS: 0   	  Exam: Normal, NAD, alert, oriented x 3, no focal deficits    HEENT  Exam: Normocephalic, atraumatic.  EOMI    RESPIRATORY  Exam: Lungs clear to auscultation, bilateral crackles at bases Normal expansion/effort  Comfortable on RA    CARDIOVASCULAR  Exam: S1, S2. mild tachycardia.  Peripheral edema    GI/NUTRITION  Exam: Abdomen soft, appropriately tender  Wound: CDI  Current Diet:  NPO    VASCULAR  Exam: Extremities warm, pink, well-perfused    MUSCULOSKELETAL  Exam: All extremities moving spontaneously without limitations    SKIN:  Exam: Good skin turgor, no skin breakdown      HEMATOLOGIC  [x] DVT Prophylaxis:   Transfusions:	[] PRBC	[] Platelets		[] FFP	[] Cryoprecipitate    Tubes/Lines/Drains   [x] Peripheral IV x 2  [] Central Venous Line     	[] R	[] L	[] IJ	[] Fem	[] SC	Date Placed:   [] Arterial Line		[] R	[] L	[] Fem	[] Rad	[] Ax	Date Placed:   [] PICC:         	[] Midline		[] Mediport  [x] Urinary Catheter		Date Placed:     LABS  --------------------------------------------------------------------------------------                        8.1    22.7 )-----------( 101      ( 11 Sep 2018 00:45 )             24.6     09-11    134  |  102  |  12  ----------------------------<  93  4.2   |  17<L> |  1.19    PT/INR - ( 11 Sep 2018 01:20 )   PT: 19.0 SEC;   INR: 1.69          PTT - ( 11 Sep 2018 01:20 )  PTT:42.5 SEC    Fibrinogen: 228 <L>

## 2018-09-11 NOTE — BRIEF OPERATIVE NOTE - OPERATION/FINDINGS
2800 cc of intraperitoneal blood. Area of bleeding on right hysterotomy extension, repaired. Endometriosis on posterior uterus and in cul-de-sac.

## 2018-09-11 NOTE — DISCHARGE NOTE OB - HOSPITAL COURSE
Pt admitted for induction of labor due to oligohydramnios.  She received cytotec and Pitocin.  At 5-6cm, FHT revealed a bradycardia with no cervical change in several hours.  Pt transferred to OR and fetal heart rate remained low.  She delivered a viable female infant via C/S.  A few hours postop, pt had a PPH and received IVF resuscitation and uterotonics.  Pt improved.  Otherwise, uncomplicated Postop course. Pt admitted for induction of labor due to oligohydramnios.  She received cytotec and Pitocin.  At 5-6cm, FHT revealed a bradycardia with no cervical change in several hours.  Pt transferred to OR and fetal heart rate remained low.  She delivered a viable female infant via C/S.  A few hours postop, pt had a PPH and received IVF resuscitation and uterotonics.  She developed anuria for several hours and went into DIC due to intraabdominal hemorrhage. She was taken back to the operating room and underwent massive transfusion protocol and hysterectomy (see operative report for full details). She recovered postoperatively in the SICU for 2 days. Vital signs and Hct remained stable. She was making adequate urine but due to the hemorrhage she went into acute renal failure 2/2 ATN. Cr went up to 3.7 and then decreased. Pt admitted for induction of labor due to oligohydramnios.  She received cytotec and Pitocin.  At 5-6cm, FHT revealed a bradycardia with no cervical change in several hours.  Pt transferred to OR and fetal heart rate remained low.  She delivered a viable female infant via C/S.  A few hours postop, pt had a PPH and received IVF resuscitation and uterotonics.  She developed anuria for several hours and went into DIC due to intraabdominal hemorrhage. She was taken back to the operating room and underwent massive transfusion protocol and hysterectomy (see operative report for full details). She recovered postoperatively in the SICU for 2 days. Vital signs and Hct remained stable. She was making adequate urine but due to the hemorrhage she went into acute renal failure 2/2 ATN. Cr went up to 3.7 and then decreased. She continued to recover on postpartum and was discharged home in stable condition on postop day Pt admitted for induction of labor due to oligohydramnios.  She received cytotec and Pitocin.  At 5-6cm, FHT revealed a bradycardia with no cervical change in several hours.  Pt transferred to OR and fetal heart rate remained low.  She delivered a viable female infant via C/S.  A few hours postop, pt had a PPH and received IVF resuscitation and uterotonics.  She developed anuria for several hours and went into DIC due to intraabdominal hemorrhage. She was taken back to the operating room and underwent massive transfusion protocol and hysterectomy (see operative report for full details). She recovered postoperatively in the SICU for 2 days. Vital signs and Hct remained stable. She was making adequate urine but due to the hemorrhage she went into acute renal failure 2/2 ATN. Cr went up to 3.7 and then decreased to 1.9 by day of discharge. She continued to recover on postpartum and was discharged home in stable condition on postop day 8.

## 2018-09-11 NOTE — CHART NOTE - NSCHARTNOTEFT_GEN_A_CORE
R3 OB    Pt seen and examined with MFM. Pt oliguric since 11pm despite IV bolus 1.5L, 125 LR per hour, and 2U PRBC. last Hct prior to transfusion 20. FAST performed at bedside, positive for intraabdominal fluid in hepatorenal and splenorenal gutters. MTP/blood bank called, anesthesia, pvt OBGYN, and charge RN notified. Pt taken emergently to OR for suspected intraabdominal bleeding. SICU aware and will have bed ready for patient postoperatively.     Loyda Flores PGY3

## 2018-09-11 NOTE — CHART NOTE - NSCHARTNOTEFT_GEN_A_CORE
Attending Note     went to evaluate pt for tachycardia and low urine output  Pt reports feeling well   minimal bleeding   no chills  no SOB     -120s  UOP < 50 cc/hour   -90/60s  Gen in NAD , pale   CV: tachycardia   Lungs: decreased BS at base, crackls at bases  Abd soft, obese, no rebound, tenderness   Perineum; chrissy blood, after examined pt had gush of blood   Ext 2 + edema, SCDs on bilateral      FAST scan neg visually seen by myself   Lung scans: B Line at bilateral basese, no B lines at apices    A/P: POD 0.5 s/p pLTCS with prolonged IOL now tachycardic and low urine output  low suspicion for intraabdominal bleeding given benign exam and negative fast scan   most likely equilibrating from EBL, will check labs now     Dr. Saul updated and evaluated pt when she had gush of blood          Clarisa Asif

## 2018-09-11 NOTE — BRIEF OPERATIVE NOTE - IV INFUSIONS - BLOOD PRODUCTS
7 u pRBC, 7u FFP, 4u plts, 4u cryo (between first exploratory laparotomy and second ex-lap/hysterectomy)

## 2018-09-11 NOTE — BRIEF OPERATIVE NOTE - PROCEDURE
<<-----Click on this checkbox to enter Procedure Salpingectomy, bilateral  2018    Active  MFRIBETHANIE  Hysterectomy after  section  2018    Active  MFRIMER

## 2018-09-11 NOTE — DISCHARGE NOTE OB - CARE PLAN
Principal Discharge DX:	 delivery delivered  Goal:	Recovery  Assessment and plan of treatment:	Regular  Secondary Diagnosis:	Other immediate postpartum hemorrhage  Goal:	improvement  Assessment and plan of treatment:	uterotonics and fluid resuscitation Principal Discharge DX:	 delivery delivered  Goal:	Recovery  Assessment and plan of treatment:	Follow up in the office for a post operative visit in 1 week.  Secondary Diagnosis:	Other immediate postpartum hemorrhage  Goal:	improvement  Assessment and plan of treatment:	Take iron and vitamin c daily for anemia. Follow up in the office 1 week after discharge. Principal Discharge DX:	 delivery delivered  Goal:	Recovery  Assessment and plan of treatment:	Follow up in the office for a post operative visit in 1 week.  Secondary Diagnosis:	Other immediate postpartum hemorrhage  Goal:	improvement  Assessment and plan of treatment:	Take iron and vitamin c daily for anemia. Follow up in the office 1 week after discharge.  Secondary Diagnosis:	DIC (disseminated intravascular coagulation)  Goal:	Recovery  Secondary Diagnosis:	Acute blood loss as cause of postoperative anemia  Goal:	Recovery  Assessment and plan of treatment:	Take iron and vitamin c daily for anemia. Follow up in the office 1 week after discharge.

## 2018-09-11 NOTE — CONSULT NOTE ADULT - ASSESSMENT
--------------------------------------------------------------------------------------    ASSESSMENT:  37F PMHx of SVT s/p ablation therapy now s/p emergent C/S for prolonged labor with concern for post partum hemorrhage verus DIC in stable condition    Discussed situation with OB attending and team during huddle. Concern for PPH in setting of DIC. Patient known to have lost at least 1.5L at this time when accounting for intraop and postop losses. Clinically stable at this time. Aggressive resuscitation should be continued to maintain adequate volume. If there is a significant concern for DIC, blood product should be given with caution and only after appropriate physiologic boluses of crystalloids have been administered. Per conversation with OB attending, patient has been offered a spot in SICU, however, OB team defers transfer/admission at this time and will resuscitate patient on the floor. Patient will be followed on continuum. Plan is to administer 1000mL LR bolus and 500mL Albumin in setting of ALANIS, DIC?, PPH?. Continue to monitor UOP and follow up plan of care with repeat blood work upon completion of resuscitative measures. SICU attending updated of plan. Will continue to follow patient    Call with questions  [phone]

## 2018-09-11 NOTE — DISCHARGE NOTE OB - SECONDARY DIAGNOSIS.
Other immediate postpartum hemorrhage DIC (disseminated intravascular coagulation) Acute blood loss as cause of postoperative anemia

## 2018-09-11 NOTE — CONSULT NOTE ADULT - SUBJECTIVE AND OBJECTIVE BOX
SICU Consultation Note  =====================================================  HPI: 37y female POD1 from primary cesarian section, . Pt noted to be anuric since 11pm 9/10 despite fluid resuscitation and 2upRBCs, H/H: 6.9/20.5, FAST +. Pt taken emergently to the OR. Small R uterine extension and bleeding from the posterior wall were noted. Bleeding was controlled and a BRISEIDA drain was placed. Incision was closed. Continuous oozing was noted from the drain site and decision was made to reopen the incision. Generalized bleeding was noted from the posterior uterine wall and adnexa and the decision was made to perform a hysterectomy. Total EBL 7L.   Pt remained intubated. Transferred to the SICU for hemodynamic monitoring.     Surgery Information  ***  Case Duration:      EBL:       IV Fluids:       Blood Products:         Complications:        HISTORY  37y Female  Allergies: Omnipaque 140 (Anaphylaxis)  penicillin (Anaphylaxis)  Tylenol (Anaphylaxis)  Zithromax Z-Alessandro (Anaphylaxis)    PAST MEDICAL & SURGICAL HISTORY:  Vasovagal syncope  Supraventricular dysrhythmia  Tendinopathy of left biceps tendon          HOME MEDICATIONS:  denies    CURRENT MEDICATIONS:   --------------------------------------------------------------------------------------  Neurologic Medications  HYDROmorphone  Injectable 0.5 milliGRAM(s) IV Push every 3 hours PRN Severe Pain (7 - 10)  ondansetron Injectable 4 milliGRAM(s) IV Push every 6 hours PRN Nausea  oxyCODONE    IR 5 milliGRAM(s) Oral every 4 hours PRN Severe Pain (7 - 10)  oxyCODONE    IR 5 milliGRAM(s) Oral every 3 hours    Respiratory Medications    Cardiovascular Medications    Gastrointestinal Medications  lactated ringers. 1000 milliLiter(s) IV Continuous <Continuous>    Genitourinary Medications  oxytocin Infusion 41.667 milliUNIT(s)/Min IV Continuous <Continuous>  oxytocin Infusion 333.333 milliUNIT(s)/Min IV Continuous <Continuous>  oxytocin Infusion 41.667 milliUNIT(s)/Min IV Continuous <Continuous>    Hematologic/Oncologic Medications  influenza   Vaccine 0.5 milliLiter(s) IntraMuscular once    Antimicrobial/Immunologic Medications    Endocrine/Metabolic Medications    Topical/Other Medications    --------------------------------------------------------------------------------------    VITAL SIGNS, INS/OUTS (last 24 hours):  --------------------------------------------------------------------------------------  ICU Vital Signs Last 24 Hrs  T(C): 37.7 (11 Sep 2018 07:30), Max: 37.8 (11 Sep 2018 04:00)  T(F): 99.9 (11 Sep 2018 07:30), Max: 100 (11 Sep 2018 04:00)  HR: 93 (11 Sep 2018 08:45) (89 - 125)  BP: 103/63 (11 Sep 2018 08:45) (91/63 - 152/62)  BP(mean): 73 (11 Sep 2018 08:45) (59 - 95)  ABP: --  ABP(mean): --  RR: 20 (11 Sep 2018 08:45) (13 - 26)  SpO2: 95% (11 Sep 2018 08:45) (95% - 100%)      --------------------------------------------------------------------------------------    EXAM  NEUROLOGY  Sedated    HEENT  Exam: Normocephalic, atraumatic.     RESPIRATORY  Exam: Lungs clear to auscultation, Normal expansion/effort.  ***  Mechanical Ventilation:     CARDIOVASCULAR  Exam: S1, S2.  Regular rate and rhythm.  Peripheral edema  ***    GI/NUTRITION  Exam: Abdomen soft, Non-tender, Non-distended.  Gastrostomy / Jejunostomy / Nasogastric tube in place.  Colostomy / Ileostomy.  ***  Wound:   ***  Current Diet:  NPO ***    VASCULAR  Exam: Extremities warm, pink, well-perfused.  ***    MUSCULOSKELETAL  Exam: All extremities moving spontaneously without limitations.  ***    SKIN:  Exam: Good skin turgor, no skin breakdown.  ***    METABOLIC/FLUIDS/ELECTROLYTES  lactated ringers. 1000 milliLiter(s) IV Continuous <Continuous>      HEMATOLOGIC  [x] DVT Prophylaxis:   Transfusions:	[] PRBC	[] Platelets		[] FFP	[] Cryoprecipitate    INFECTIOUS DISEASE  Antimicrobials/Immunologic Medications:  influenza   Vaccine 0.5 milliLiter(s) IntraMuscular once    Day #  	of    ***    Tubes/Lines/Drains  ***  [x] Peripheral IV  [] Central Venous Line     	[] R	[] L	[] IJ	[] Fem	[] SC	Date Placed:   [] Arterial Line		[] R	[] L	[] Fem	[] Rad	[] Ax	Date Placed:   [] PICC:         	[] Midline		[] Mediport  [] Urinary Catheter		Date Placed:     LABS  --------------------------------------------------------------------------------------    I&O's Detail    10 Sep 2018 07:01  -  11 Sep 2018 07:00  --------------------------------------------------------  IN:    Lactated Ringers IV Bolus: 1500 mL    lactated ringers.: 125 mL    lactated ringers.: 1375 mL    lactated ringers.: 375 mL    Other: 2000 mL    oxytocin Infusion: 875 mL    Packed Red Blood Cells: 592 mL  Total IN: 6842 mL    OUT:    Estimated Blood Loss: 1400 mL    Indwelling Catheter - Urethral: 1220 mL  Total OUT: 2620 mL    Total NET: 4222 mL      11 Sep 2018 07:01  -  11 Sep 2018 15:03  --------------------------------------------------------  IN:    lactated ringers.: 125 mL  Total IN: 125 mL    OUT:  Total OUT: 0 mL    Total NET: 125 mL                                7.0    12.67 )-----------( 129      ( 11 Sep 2018 13:00 )             20.0       09-11    137  |  104  |  15  ----------------------------<  146<H>  6.4<HH>   |  17<L>  |  1.63<H>    Ca    8.1<L>      11 Sep 2018 13:00    TPro  4.7<L>  /  Alb  2.6<L>  /  TBili  1.8<H>  /  DBili  x   /  AST  24  /  ALT  13  /  AlkPhos  46  09-11      CAPILLARY BLOOD GLUCOSE          LIVER FUNCTIONS - ( 11 Sep 2018 13:00 )  Alb: 2.6 g/dL / Pro: 4.7 g/dL / ALK PHOS: 46 u/L / ALT: 13 u/L / AST: 24 u/L / GGT: x             PT/INR - ( 11 Sep 2018 13:00 )   PT: 12.5 SEC;   INR: 1.12          PTT - ( 11 Sep 2018 13:00 )  PTT:29.4 SEC    Urinalysis Basic - ( 10 Sep 2018 18:16 )    Color: LIGHT YELLOW / Appearance: CLEAR / S.015 / pH: 6.5  Gluc: NEGATIVE / Ketone: SMALL  / Bili: NEGATIVE / Urobili: NORMAL   Blood: MODERATE / Protein: 10 / Nitrite: NEGATIVE   Leuk Esterase: NEGATIVE / RBC: 0-2 / WBC 3-5   Sq Epi: OCC / Non Sq Epi: x / Bacteria: NEGATIVE      -------------------------------------------------------------------------------------- SICU Consultation Note  =====================================================  HPI: 37y female PMHx of SVT s/p ablation in  POD1 from primary cesarian section,  +500 PPH. Pt noted to be anuric since 11pm 9/10 despite fluid resuscitation and 2upRBCs, H/H: 6.9/20.5, plt 72, FAST +. Pt taken emergently to the OR. Small R uterine extension and bleeding from the posterior wall were noted. Bleeding was controlled and a BRISEIDA drain was placed. Incision was closed. Continuous oozing was noted from the drain site and decision was made to reopen the incision. Generalized bleeding was noted from the posterior uterine wall and adnexa and the decision was made to perform a hysterectomy and bilateral salpingectomy. Total EBL 7L.  during case  Pt remained intubated. Transferred to the SICU for hemodynamic monitoring.     Surgery Information    Case Duration: 6 hrs     EBL: 5.5L      IV Fluids: 4L LR      Blood Products: 7u pRBCs, 7u FFP, 4u platelets, 4u cryo                HISTORY  37y Female  Allergies: Omnipaque 140 (Anaphylaxis)  penicillin (Anaphylaxis)  Tylenol (Anaphylaxis)  Zithromax Z-Alessandro (Anaphylaxis)    PAST MEDICAL & SURGICAL HISTORY:  Vasovagal syncope  Supraventricular dysrhythmia  Tendinopathy of left biceps tendon          HOME MEDICATIONS:  denies    CURRENT MEDICATIONS:   --------------------------------------------------------------------------------------  Neurologic Medications  HYDROmorphone  Injectable 0.5 milliGRAM(s) IV Push every 3 hours PRN Severe Pain (7 - 10)  ondansetron Injectable 4 milliGRAM(s) IV Push every 6 hours PRN Nausea  oxyCODONE    IR 5 milliGRAM(s) Oral every 4 hours PRN Severe Pain (7 - 10)  oxyCODONE    IR 5 milliGRAM(s) Oral every 3 hours    Respiratory Medications    Cardiovascular Medications    Gastrointestinal Medications  lactated ringers. 1000 milliLiter(s) IV Continuous <Continuous>    Genitourinary Medications  oxytocin Infusion 41.667 milliUNIT(s)/Min IV Continuous <Continuous>  oxytocin Infusion 333.333 milliUNIT(s)/Min IV Continuous <Continuous>  oxytocin Infusion 41.667 milliUNIT(s)/Min IV Continuous <Continuous>    Hematologic/Oncologic Medications  influenza   Vaccine 0.5 milliLiter(s) IntraMuscular once    Antimicrobial/Immunologic Medications    Endocrine/Metabolic Medications    Topical/Other Medications    --------------------------------------------------------------------------------------    VITAL SIGNS, INS/OUTS (last 24 hours):  --------------------------------------------------------------------------------------  ICU Vital Signs Last 24 Hrs  T(C): 37.7 (11 Sep 2018 07:30), Max: 37.8 (11 Sep 2018 04:00)  T(F): 99.9 (11 Sep 2018 07:30), Max: 100 (11 Sep 2018 04:00)  HR: 93 (11 Sep 2018 08:45) (89 - 125)  BP: 103/63 (11 Sep 2018 08:45) (91/63 - 152/62)  BP(mean): 73 (11 Sep 2018 08:45) (59 - 95)  ABP: --  ABP(mean): --  RR: 20 (11 Sep 2018 08:45) (13 - 26)  SpO2: 95% (11 Sep 2018 08:45) (95% - 100%)      --------------------------------------------------------------------------------------    EXAM  NEUROLOGY  Sedated    HEENT  Exam: Normocephalic, atraumatic.     RESPIRATORY  Exam: Coarse lung sounds bilaterally.  Mechanical Ventilation: Volume AC 40    CARDIOVASCULAR  Exam: S1, S2.  Regular rate and rhythm.  Moderate peripheral edema     GI/NUTRITION  Exam: Abdomen soft, badage in place, BRISEIDA drain from R abdomen  Current Diet:  NPO     VASCULAR  Exam: Extremities warm, pink, well-perfused.       SKIN:  Exam: Good skin turgor, no skin breakdown.      METABOLIC/FLUIDS/ELECTROLYTES  lactated ringers. 1000 milliLiter(s) IV Continuous <Continuous>      HEMATOLOGIC  [x] DVT Prophylaxis:   Transfusions:	[x] 9u PRBC	[x] 4u Platelets    [x] 7u FFP	[x] 4u Cryoprecipitate    INFECTIOUS DISEASE  Antimicrobials/Immunologic Medications:  influenza   Vaccine 0.5 milliLiter(s) IntraMuscular once        Tubes/Lines/Drains  ***  [x] Peripheral IV  [] Central Venous Line     	[] R	[] L	[] IJ	[] Fem	[] SC	Date Placed:   [x] Arterial Line		[x] R	[] L	[] Fem	[x] Rad	[] Ax	Date Placed:   [] PICC:         	[] Midline		[] Mediport  [x] Urinary Catheter		Date Placed:     LABS  --------------------------------------------------------------------------------------    I&O's Detail    10 Sep 2018 07:01  -  11 Sep 2018 07:00  --------------------------------------------------------  IN:    Lactated Ringers IV Bolus: 1500 mL    lactated ringers.: 125 mL    lactated ringers.: 1375 mL    lactated ringers.: 375 mL    Other: 2000 mL    oxytocin Infusion: 875 mL    Packed Red Blood Cells: 592 mL  Total IN: 6842 mL    OUT:    Estimated Blood Loss: 1400 mL    Indwelling Catheter - Urethral: 1220 mL  Total OUT: 2620 mL    Total NET: 4222 mL      11 Sep 2018 07:01  -  11 Sep 2018 15:03  --------------------------------------------------------  IN:    lactated ringers.: 125 mL  Total IN: 125 mL    OUT:  Total OUT: 0 mL    Total NET: 125 mL                                7.0    12.67 )-----------( 129      ( 11 Sep 2018 13:00 )             20.0       09-11    137  |  104  |  15  ----------------------------<  146<H>  6.4<HH>   |  17<L>  |  1.63<H>    Ca    8.1<L>      11 Sep 2018 13:00    TPro  4.7<L>  /  Alb  2.6<L>  /  TBili  1.8<H>  /  DBili  x   /  AST  24  /  ALT  13  /  AlkPhos  46  09-11      CAPILLARY BLOOD GLUCOSE          LIVER FUNCTIONS - ( 11 Sep 2018 13:00 )  Alb: 2.6 g/dL / Pro: 4.7 g/dL / ALK PHOS: 46 u/L / ALT: 13 u/L / AST: 24 u/L / GGT: x             PT/INR - ( 11 Sep 2018 13:00 )   PT: 12.5 SEC;   INR: 1.12          PTT - ( 11 Sep 2018 13:00 )  PTT:29.4 SEC    Urinalysis Basic - ( 10 Sep 2018 18:16 )    Color: LIGHT YELLOW / Appearance: CLEAR / S.015 / pH: 6.5  Gluc: NEGATIVE / Ketone: SMALL  / Bili: NEGATIVE / Urobili: NORMAL   Blood: MODERATE / Protein: 10 / Nitrite: NEGATIVE   Leuk Esterase: NEGATIVE / RBC: 0-2 / WBC 3-5   Sq Epi: OCC / Non Sq Epi: x / Bacteria: NEGATIVE      --------------------------------------------------------------------------------------

## 2018-09-11 NOTE — PROGRESS NOTE ADULT - PROBLEM SELECTOR PLAN 1
Neuro: c/w oral analgesia as needed, patient has tylenol allergy  CV: tachycardic but hemodynamically stable, normotensive  Pulm: Slight orthopnea, but saturating well on RA when upright.  Continue to encourage incentive spirometry.  Monitor O2 sats, symptoms and other signs of pulmonary edema  GI: NPO, advance diet when UOP improves  : ALANIS with oliguria, likely pre-renal 2/2 acute blood loss.  Will evaluate FeNA when patient makes some urine.  s/p 1500 IVF bolus post-op, c/w LR@125  Heme: ambulation, SCDs, and heparin for DVT ppx.  No sign of continued intraabdominal bleeding.  Vaginal bleeding improved s/p methergine.  H/H continues to drop, likely 2/2 equilibration from intra and post-op blood loss.  2uPRBC ordered   Appreciate SICU recommendations    FITO Puente PGY3

## 2018-09-11 NOTE — PROGRESS NOTE ADULT - ATTENDING COMMENTS
Patient seen and examined at bedside, called to re-eval do to increased BRISEIDA output  BRISEIDA out 575 over 1 hour. red bloody, not serosanguinous in appearance  Vitals 120s/70s, pulse 60-80s  u/o 10cc 1.5 hrs  NAD  Abd distended, appropriately tend post op  incision c/d/i dressing dry  Plan  Heme - evaluated patient at beside, recommend active transfusion of blood products asap, concern for DIC, 1:1 transfusion in an obstetrical patient post partum hemorrhage is recommended.   discussed with Dr. Starr, who will await blood draw results and start transfusion. start with FFP as patient just received 2 u blood from 3-6 pm   u/o low, decreased from last few hours 20-30cc hourly previously, no 10 cc over 1.5 hrs  GI npo, sedated  Pulm stable, intubated  call gyn oncology team with any changes in patient status Patient seen and examined at bedside, called to re-eval do to increased BRISEIDA output  BRISEIDA out 575 over 1 hour. red bloody, not serosanguinous in appearance  Vitals 120s/70s, pulse 60-80s  u/o 10cc 1.5 hrs  NAD  Abd distended, appropriately tend post op  incision c/d/i dressing dry  Plan  Heme - evaluated patient at beside, recommend active transfusion of blood products asap, concern for DIC, 1:1 transfusion in an obstetrical patient post partum hemorrhage is recommended.   discussed with Dr. Starr, who will await blood draw results and start transfusion. start with FFP as patient just received 2 u blood from 3-6 pm   u/o low, decreased from last few hours 20-30cc hourly previously, now 10 cc over 1.5 hrs  GI npo, sedated  Pulm stable, intubated  call gyn oncology team with any changes in patient status

## 2018-09-11 NOTE — PROGRESS NOTE ADULT - ASSESSMENT
36 yo  POD#1 s/p emergent c/s with acute blood loss anemia.  Patient is s/p 900 cc intraop blood loss with an additional 500 cc vaginal blood loss postpartum.  Patient is mildly tachycardic with no urine output for the past 3 hours.  She remains asymptomatic and hemodynamically stable.  Physical exam and fast scan are reassuring and suspicion is low for intraabdominal bleeding.

## 2018-09-11 NOTE — PROGRESS NOTE ADULT - SUBJECTIVE AND OBJECTIVE BOX
R[facility] GYN ONC Progress Note:     S:     O:   ICU Vital Signs Last 24 Hrs  T(C): 36.1 (11 Sep 2018 16:00), Max: 37.8 (11 Sep 2018 04:00)  T(F): 96.9 (11 Sep 2018 16:00), Max: 100 (11 Sep 2018 04:00)  HR: 74 (11 Sep 2018 18:57) (65 - 125)  BP: 113/52 (11 Sep 2018 15:35) (91/63 - 152/62)  BP(mean): 67 (11 Sep 2018 15:35) (59 - 95)  ABP: 117/71 (11 Sep 2018 18:00) (115/69 - 138/78)  ABP(mean): 89 (11 Sep 2018 18:00) (87 - 100)  RR: 12 (11 Sep 2018 18:00) (12 - 26)  SpO2: 94% (11 Sep 2018 18:57) (94% - 100%)      10 Sep 2018 07:01  -  11 Sep 2018 07:00  --------------------------------------------------------  IN:    Lactated Ringers IV Bolus: 1500 mL    lactated ringers.: 125 mL    lactated ringers.: 1375 mL    lactated ringers.: 375 mL    Other: 2000 mL    oxytocin Infusion: 875 mL    Packed Red Blood Cells: 592 mL  Total IN: 6842 mL    OUT:    Estimated Blood Loss: 1400 mL    Indwelling Catheter - Urethral: 1220 mL  Total OUT: 2620 mL    Total NET: 4222 mL      11 Sep 2018 07:01  -  11 Sep 2018 19:08  --------------------------------------------------------  IN:    fentaNYL  Infusion: 37.2 mL    lactated ringers.: 625 mL    Other: 67324 mL    Packed Red Blood Cells: 600 mL    propofol Infusion: 52.9 mL  Total IN: 40526.1 mL    OUT:    Bulb: 10 mL    Estimated Blood Loss: 5500 mL    Indwelling Catheter - Urethral: 400 mL  Total OUT: 5910 mL    Total NET: 6401.1 mL    Physical Exam:  Gen: intubated and sedated R4 GYN ONC Progress Note:     S: Patient sedated and intubated. Hemodynamically stable. Increased BRISEIDA output of bright red blood at this time. Ordered for 2uPRBCs.    O:   ICU Vital Signs Last 24 Hrs  T(C): 36.1 (11 Sep 2018 16:00), Max: 37.8 (11 Sep 2018 04:00)  T(F): 96.9 (11 Sep 2018 16:00), Max: 100 (11 Sep 2018 04:00)  HR: 74 (11 Sep 2018 18:57) (65 - 125)  BP: 113/52 (11 Sep 2018 15:35) (91/63 - 152/62)  BP(mean): 67 (11 Sep 2018 15:35) (59 - 95)  ABP: 117/71 (11 Sep 2018 18:00) (115/69 - 138/78)  ABP(mean): 89 (11 Sep 2018 18:00) (87 - 100)  RR: 12 (11 Sep 2018 18:00) (12 - 26)  SpO2: 94% (11 Sep 2018 18:57) (94% - 100%)      10 Sep 2018 07:01  -  11 Sep 2018 07:00  --------------------------------------------------------  IN:    Lactated Ringers IV Bolus: 1500 mL    lactated ringers.: 125 mL    lactated ringers.: 1375 mL    lactated ringers.: 375 mL    Other: 2000 mL    oxytocin Infusion: 875 mL    Packed Red Blood Cells: 592 mL  Total IN: 6842 mL    OUT:    Estimated Blood Loss: 1400 mL    Indwelling Catheter - Urethral: 1220 mL  Total OUT: 2620 mL    Total NET: 4222 mL      11 Sep 2018 07:01  -  11 Sep 2018 19:08  --------------------------------------------------------  IN:    fentaNYL  Infusion: 37.2 mL    lactated ringers.: 625 mL    Other: 45094 mL    Packed Red Blood Cells: 600 mL    propofol Infusion: 52.9 mL  Total IN: 57769.1 mL    OUT:    Bulb: 10 mL    Estimated Blood Loss: 5500 mL    Indwelling Catheter - Urethral: 400 mL  Total OUT: 5910 mL    Total NET: 6401.1 mL    Physical Exam:  Gen: intubated and sedated   Abd: soft, distended  BRISEIDA output increasing with bright red blood, as per OB team at the bedside 575ccs/40 minutes    Labs:                       7.0    11.73 )-----------( 116      ( 11 Sep 2018 15:27 ) ->2uPRBCs             20.0     PT/INR - ( 11 Sep 2018 15:27 )   PT: 12.4 SEC;   INR: 1.11       PTT - ( 11 Sep 2018 15:27 )  PTT:28.5 SEC [facility] GYN ONC Progress Note:     S: Patient sedated and intubated. Hemodynamically stable. Increased BRISEIDA output of bright red blood at this time. Ordered for 2uPRBCs.    O:   ICU Vital Signs Last 24 Hrs  T(C): 36.1 (11 Sep 2018 16:00), Max: 37.8 (11 Sep 2018 04:00)  T(F): 96.9 (11 Sep 2018 16:00), Max: 100 (11 Sep 2018 04:00)  HR: 74 (11 Sep 2018 18:57) (65 - 125)  BP: 113/52 (11 Sep 2018 15:35) (91/63 - 152/62)  BP(mean): 67 (11 Sep 2018 15:35) (59 - 95)  ABP: 117/71 (11 Sep 2018 18:00) (115/69 - 138/78)  ABP(mean): 89 (11 Sep 2018 18:00) (87 - 100)  RR: 12 (11 Sep 2018 18:00) (12 - 26)  SpO2: 94% (11 Sep 2018 18:57) (94% - 100%)      10 Sep 2018 07:01  -  11 Sep 2018 07:00  --------------------------------------------------------  IN:    Lactated Ringers IV Bolus: 1500 mL    lactated ringers.: 125 mL    lactated ringers.: 1375 mL    lactated ringers.: 375 mL    Other: 2000 mL    oxytocin Infusion: 875 mL    Packed Red Blood Cells: 592 mL  Total IN: 6842 mL    OUT:    Estimated Blood Loss: 1400 mL    Indwelling Catheter - Urethral: 1220 mL  Total OUT: 2620 mL    Total NET: 4222 mL      11 Sep 2018 07:01  -  11 Sep 2018 19:08  --------------------------------------------------------  IN:    fentaNYL  Infusion: 37.2 mL    lactated ringers.: 625 mL    Other: 39155 mL    Packed Red Blood Cells: 600 mL    propofol Infusion: 52.9 mL  Total IN: 00290.1 mL    OUT:    Bulb: 10 mL    Estimated Blood Loss: 5500 mL    Indwelling Catheter - Urethral: 400 mL  Total OUT: 5910 mL    Total NET: 6401.1 mL    Physical Exam:  Gen: intubated and sedated   Abd: soft, distended, appropriate post op tenderness, no rebound or guarding, dressing is clean and dry  BRISEIDA output increasing with bright red blood, as per OB team at the bedside 575ccs/40 minutes    Labs:                       7.0    11.73 )-----------( 116      ( 11 Sep 2018 15:27 ) ->2uPRBCs             20.0     PT/INR - ( 11 Sep 2018 15:27 )   PT: 12.4 SEC;   INR: 1.11       PTT - ( 11 Sep 2018 15:27 )  PTT:28.5 SEC

## 2018-09-11 NOTE — CHART NOTE - NSCHARTNOTEFT_GEN_A_CORE
R3 OB Postpartum Note    Patient seen and examined with Dr Asif at bedside in PACU for low urine output and tachycardia.  Patient has not yet been out of bed.  She is tolerating sips of water without nausea.  She denies CP, SOB, palpitations, and lightheadedness.  Pain is controlled with dilaudid.  Vaginal bleeding is well controlled since episode of post-op bleeding earlier.     Physical exam:    Vital Signs Last 24 Hrs  T(C): 36.7 (10 Sep 2018 21:15), Max: 36.7 (10 Sep 2018 21:15)  T(F): 98.1 (10 Sep 2018 21:15), Max: 98.1 (10 Sep 2018 21:15)  HR: 114 (11 Sep 2018 00:30) (89 - 114)  BP: 110/64 (11 Sep 2018 00:30) (104/48 - 152/62)  BP(mean): 74 (11 Sep 2018 00:30) (63 - 95)  RR: 19 (11 Sep 2018 00:30) (18 - 22)  SpO2: 96% (11 Sep 2018 00:30) (95% - 100%)     @ 07:01  -  09-10 @ 07:00  --------------------------------------------------------  IN: 4000 mL / OUT: 0 mL / NET: 4000 mL    09-10 @ 07:11 @ 01:05  --------------------------------------------------------  IN: 4375 mL / OUT: 2320 mL / NET: 2055 mL        Gen: NAD  CV Mild tachycardia (100s)  Pulm b/l crackles and bases  Abdomen: Soft, appropriate post-op tenderness, non- distended , firm uterine fundus at umbilicus.  Incision: Dressing clean, dry, and intact  Pelvic: Normal lochia noted  Ext: No calf tenderness. 2+ pitting edema b/l    FAST scan: neg    Lung sono: B lines at b/l bases, only A lines at apexes      LABS:                        10.8   11.82 )-----------( 191      ( 10 Sep 2018 18:16 )             32.8     ABO Interpretation: A (09-10 @ 18:02)  Rh Interpretation: Negative (09-10 @ 18:02)  Antibody Screen: Negative (09-10 @ 18:02)    09-10-18 @ 18:16      134<L>  |  105  |  7   ----------------------------<  66<L>  4.0   |  19<L>  |  0.66        Ca    8.5      10 Sep 2018 18:16    TPro  6.2  /  Alb  2.7<L>  /  TBili  0.6  /  DBili  x   /  AST  19  /  ALT  8   /  AlkPhos  109  09-10-18 @ 18:16        37y POD1 s/p  section hemodynamically stable and meeting all appropriate post-op milestones  -Encourage Ambulation  -Continue with regular diet  -Heparin, SCDs, and ambulation for DVT ppx  -Discontinue benz  -Check CBC  -Analgesia as needed R3 OB Postpartum Note    Patient seen and examined with Dr Asif at bedside in PACU for low urine output and tachycardia.  Patient has not yet been out of bed.  She is tolerating sips of water without nausea.  She denies CP, SOB, palpitations, and lightheadedness.  Pain is controlled with dilaudid.  Vaginal bleeding is well controlled since episode of post-op bleeding earlier.     5 minutes after initial evaluation, RN called to inform of another gush of blood on the bib - estimated to be 100 cc.  Dr Saul and Dr Victoria assisted in evaluating patient.    Physical exam:    Vital Signs Last 24 Hrs  T(C): 36.7 (10 Sep 2018 21:15), Max: 36.7 (10 Sep 2018 21:15)  T(F): 98.1 (10 Sep 2018 21:15), Max: 98.1 (10 Sep 2018 21:15)  HR: 114 (11 Sep 2018 00:30) (89 - 114)  BP: 110/64 (11 Sep 2018 00:30) (104/48 - 152/62)  BP(mean): 74 (11 Sep 2018 00:30) (63 - 95)  RR: 19 (11 Sep 2018 00:30) (18 - 22)  SpO2: 96% (11 Sep 2018 00:30) (95% - 100%)     @ :  -  09-10 @ 07:00  --------------------------------------------------------  IN: 4000 mL / OUT: 0 mL / NET: 4000 mL    09-10 @ 07:  -  11 @ 01:05  --------------------------------------------------------  IN: 4375 mL / OUT: 2320 mL / NET: 2055 mL        Gen: NAD  CV Mild tachycardia (100s)  Pulm b/l crackles and bases  Abdomen: Soft, appropriate post-op tenderness, non- distended , firm uterine fundus at umbilicus.  Incision: Dressing clean, dry, and intact  Pelvic: Normal lochia noted  Ext: No calf tenderness. 2+ pitting edema b/l    FAST scan: neg    Lung sono: B lines at b/l bases, only A lines at apexes      LABS:                        10.8   11.82 )-----------( 191      ( 10 Sep 2018 18:16 )             32.8     ABO Interpretation: A (09-10 @ 18:02)  Rh Interpretation: Negative (09-10 @ 18:)  Antibody Screen: Negative (09-10 @ 18:)    09-10-18 @ 18:16      134<L>  |  105  |  7   ----------------------------<  66<L>  4.0   |  19<L>  |  0.66        Ca    8.5      10 Sep 2018 18:16    TPro  6.2  /  Alb  2.7<L>  /  TBili  0.6  /  DBili  x   /  AST  19  /  ALT  8   /  AlkPhos  109  09-10-18 @ 18:16        37y POD1 s/p  section hemodynamically stable and meeting all appropriate post-op milestones  -Encourage Ambulation  -Continue with regular diet  -Heparin, SCDs, and ambulation for DVT ppx  -Discontinue benz  -Check CBC  -Analgesia as needed R3 OB Postpartum Note    Patient seen and examined with Dr Asif at bedside in PACU for low urine output and tachycardia.  Patient has not yet been out of bed.  She is tolerating sips of water without nausea.  She denies CP, SOB, palpitations, and lightheadedness.  Pain is controlled with dilaudid.  Vaginal bleeding is well controlled since episode of post-op bleeding earlier.     5 minutes after initial evaluation, RN called to inform of another gush of blood on the bib - estimated to be 100 cc.  Dr Saul and Dr Victoria assisted in evaluating patient.  Bimanual exam performed, fundus and lower uterine segment firm, no clots expelled.  Second IV started.  Patient without new complaint.    Physical exam:    Vital Signs Last 24 Hrs  T(C): 36.7 (10 Sep 2018 21:15), Max: 36.7 (10 Sep 2018 21:15)  T(F): 98.1 (10 Sep 2018 21:15), Max: 98.1 (10 Sep 2018 21:15)  HR: 114 (11 Sep 2018 00:30) (89 - 114)  BP: 110/64 (11 Sep 2018 00:30) (104/48 - 152/62)  BP(mean): 74 (11 Sep 2018 00:30) (63 - 95)  RR: 19 (11 Sep 2018 00:30) (18 - 22)  SpO2: 96% (11 Sep 2018 00:30) (95% - 100%)     @ :  -  09-10 @ 07:00  --------------------------------------------------------  IN: 4000 mL / OUT: 0 mL / NET: 4000 mL    09-10 @ 07:11 @ 01:05  --------------------------------------------------------  IN: 4375 mL / OUT: 2320 mL / NET: 2055 mL        Gen: NAD  CV Mild tachycardia (100s)  Pulm b/l crackles and bases  Abdomen: Soft, appropriate post-op tenderness, non- distended , firm uterine fundus at umbilicus.  Incision: Dressing clean, dry, and intact  Pelvic: Normal lochia noted  Ext: No calf tenderness. 2+ pitting edema b/l    FAST scan: neg    Lung sono: B lines at b/l bases, only A lines at apexes      LABS:                        10.8   11.82 )-----------( 191      ( 10 Sep 2018 18:16 )             32.8     ABO Interpretation: A (09-10 @ 18:02)  Rh Interpretation: Negative (09-10 @ 18:02)  Antibody Screen: Negative (09-10 @ 18:02)    09-10-18 @ 18:16      134<L>  |  105  |  7   ----------------------------<  66<L>  4.0   |  19<L>  |  0.66        Ca    8.5      10 Sep 2018 18:16    TPro  6.2  /  Alb  2.7<L>  /  TBili  0.6  /  DBili  x   /  AST  19  /  ALT  8   /  AlkPhos  109  09-10-18 @ 18:16        37y POD1 s/p  section with signs of hypovolemia, no signs of intraabdominal bleeding, total EBL 1200 cc.  Differential includes acute blood loss anemia, hypovolemia, ongoing bleeding less likely given exam and ultrasound  -f/u stat CBC, CMP, coags, and lactate   -Monitor bleeding and VS    FITO Puente PGY3

## 2018-09-11 NOTE — PROGRESS NOTE ADULT - ASSESSMENT
A/P: 36yo POD 0 status post  hysterectomy 2/2 massive PPH and DIC currently in SICU intubated and sedated with bright red blood filling in BRISEIDA drain. Patient oliguric. Concern is for DIC. Dr. Walters and Dr. Taylor at bedside. Plan made to Call MTP by Dr. Walters in conjunction w/ SICU resident Dr. Vargas. No surgical intervention at this time.    [place] [id number] d/w Dr. Walters and Dr. Taylor GYN ONC Fellow  Dr. Walters and Dr. Taylor GYN ONC Fellow at the bedside A/P: 36yo POD 0 status post  hysterectomy 2/2 PPH and DIC currently in SICU intubated and sedated with bright red blood filling in BRISEIDA drain. Patient oliguric. Concern is for DIC. Dr. Walters and Dr. Taylor at bedside. Plan made to Call MTP by Dr. Walters in conjunction w/ SICU resident Dr. Vargas. No surgical intervention at this time.    [place] [id number] d/w Dr. Walters and Dr. Taylor GYN ONC Fellow  Dr. Walters and Dr. Taylor GYN ONC Fellow at the bedside

## 2018-09-11 NOTE — DISCHARGE NOTE OB - PLAN OF CARE
Recovery Regular improvement uterotonics and fluid resuscitation Follow up in the office for a post operative visit in 1 week. Take iron and vitamin c daily for anemia. Follow up in the office 1 week after discharge.

## 2018-09-11 NOTE — CONSULT NOTE ADULT - ATTENDING COMMENTS
Seen and examined.  Chart and note reviewed, case discussed with Dr. Frimer, Fleischer and OB team.      Acute posthemorrhage anemia r/o DIC  a.  Transfuse pRBC as needed.  Maintain high pRBC:FFP ratio    Hypotension  a.  Continue IVF at 125ml/hr  b.  Attach flowtrac monitor for fluid mgt guidance    Respiratory failure  a.  Obtain ABG Q 2  b.  Continue vent support overnight  c.  Possible CPAP trial in am based on hemodynamic status  d.  Propofol for sedation    ALANIS  a.  Monitor UO  b.  Trend creatinine    Current status and prognosis discussed with spouse  Spend 105 minutes in critical care exclusive of procedures
Seen and examined.  Chart and note reviewed, case discussed with Dr. Frimer, Fleischer and OB team.      Acute posthemorrhage anemia r/o DIC  a.  Transfuse pRBC as needed.  Maintain high pRBC:FFP ratio    Hypotension  a.  Continue IVF at 125ml/hr  b.  Attach flowtrac monitor for fluid mgt guidance    Respiratory failure  a.  Obtain ABG Q 2  b.  Continue vent support overnight  c.  Possible CPAP trial in am based on hemodynamic status  d.  Propofol for sedation    ALANIS  a.  Monitor UO  b.  Trend creatinine    Current status and prognosis discussed with spouse  Spend 105 minutes in critical care exclusive of procedures

## 2018-09-11 NOTE — DISCHARGE NOTE OB - PATIENT PORTAL LINK FT
You can access the [facility] Patient Portal, offered by [facility], by registering with the following website: [email or web address]

## 2018-09-11 NOTE — BRIEF OPERATIVE NOTE - OPERATION/FINDINGS
Called to the OR after reoperation for post partum hemorrhage, after closure of the abdomen, OB team noted significant bleeding from the BRISEIDA drain that was placed. abdomen reopened by OB team and significant oozing noted from all surfaces, plan made by OB team to proceed with hysterectomy and gyn oncology consult was called. AT time of my arrival, patient had EBL of approx 4.5 L, received 4 u pRBCs, 4 FFP, Intraop - noted bleeding from uteroovarian, hysterectomy performed, patient was in DIC during the hysterectomy and continued to receive products, total received 7 u pRBCs, 7 FFP, 3 Cryo, 5 platelets (4packets each).   Bleeding subsided after receiving products and hysterectomy completed. hemostasis assured prior to closure. U/O approx 65 cc for the case, total EBL approx 7L. no pressors requirement through the case.

## 2018-09-11 NOTE — CHART NOTE - NSCHARTNOTEFT_GEN_A_CORE
R4 OB Note    Patient seen at bedside with Dr. Fleischer and Dr. Callahan. Discussion with Dr. Reyna regarding plan for patient. Currently patient with stable vital signs, not requiring pressors. BRISEIDA output has been stable over the past 30 min stable at 100cc. Kapoor in place with clear urine.    Vital Signs Last 24 Hours  T(C): 35.8 (18 @ 20:00), Max: 43 (18 @ 20:00)  HR: 69 (18 @ 20:00) (65 - 125)  BP: 113/52 (18 @ 15:35) (91/63 - 152/62)  RR: 11 (18 @ 20:00) (11 - 26)  SpO2: 95% (18 @ 20:00) (94% - 100%)    I&O's Summary    10 Sep 2018 07:  -  11 Sep 2018 07:00  --------------------------------------------------------  IN: 6842 mL / OUT: 2620 mL / NET: 4222 mL    11 Sep 2018 07:  -  11 Sep 2018 20:43  --------------------------------------------------------  IN: 90622.6 mL / OUT: 6670 mL / NET: 5849.6 mL    BRISEIDA drain 100cc over past 30 minutes.    Physical Exam:  General: intubated  Abdomen: Soft, non-distended  Incision: Pfannenstiel incision CDI    Labs:    Blood Type: A Negative  Antibody Screen: Negative  RPR: Negative               9.0    11.07 )-----------( 124      (  @ 19:27 )             25.3                7.0    11.73 )-----------( 116      (  @ 15:27 )             20.0                7.0    12.67 )-----------( 129      (  @ 13:00 )             20.0                8.8    18.46 )-----------( 79       (  @ 11:28 )             25.3                8.0    17.10 )-----------( 69       (  @ 09:43 )             22.9                8.4    18.50 )-----------( 75       (  @ 09:00 )             24.5                6.9    18.46 )-----------( 72       (  @ 04:10 )             20.5                8.1    22.70 )-----------( 101      (  @ 00:45 )             24.6        19:27    135  |  106  |  19  ----------------------------<  113  5.4   |  19  |  2.11    Ca    7.8       19:27  Phos  6.2     :27  Mg     1.8      19:27    TPro  4.3  /  Alb  2.4  /  TBili  1.7  /  DBili  x   /  AST  27  /  ALT  13  /  AlkPhos  45   15:27    PT/INR - (  @ 19:27 )   PT: 11.6 SEC;   INR: 1.01     PTT - (  19:27 )  PTT:27.3 SEC    Uric Acid: ( 19:27)  --       Fibrinogen: ( 19:27)  471.9    LDH: (:27)  --       MEDICATIONS  (STANDING):  chlorhexidine 0.12% Liquid 15 milliLiter(s) Swish and Spit two times a day  chlorhexidine 4% Liquid 1 Application(s) Topical <User Schedule>  [id number]   Infusion 2 MICROgram(s)/kG/Hr (18.54 mL/Hr) IV Continuous <Continuous>  influenza   Vaccine 0.5 milliLiter(s) IntraMuscular once  lactated ringers. 1000 milliLiter(s) (125 mL/Hr) IV Continuous <Continuous>    A/P 38yo  s/p pLTCS on 9/10 complicated by PPH and return to OR for intrabominal hemorrhage s/p hysterectomy with total EBL of approximately 7L, s/p MTP.    - appreciate SICU care  - agree that transfusion currently not indicated as H/H stable, plts and coags stable  - will monitor BRISEIDA drain output per hour. If BRISEIDA drain output >150-200cc over an hour will consider re-op.   - labs in 4 hours    seen with Dr. Fleischer and Dr. Callahan    [facility] pgy4

## 2018-09-11 NOTE — DISCHARGE NOTE OB - MEDICATION SUMMARY - MEDICATIONS TO TAKE
I will START or STAY ON the medications listed below when I get home from the hospital:    oxyCODONE 5 mg oral tablet  -- 1 tab(s) by mouth every 6 hours MDD:4  -- Indication: For pain as needed    Prenatabs Rx oral tablet  -- 1 tab(s) by mouth once a day  -- Indication: For vitamin signs I will START or STAY ON the medications listed below when I get home from the hospital:    oxyCODONE 5 mg oral tablet  -- 1 tab(s) by mouth every 6 hours MDD:4  -- Indication: For pain as needed    ferrous sulfate 325 mg (65 mg elemental iron) oral delayed release tablet  -- 1 tab(s) by mouth once a day   -- May discolor urine or feces.  Swallow whole.  Do not crush.    -- Indication: For Anemia    Prenatabs Rx oral tablet  -- 1 tab(s) by mouth once a day  -- Indication: For vitamin signs     docusate sodium 100 mg oral capsule  -- 1 cap(s) by mouth 3 times a day  -- Indication: For Constipation    melatonin 3 mg oral tablet  -- 2 tab(s) by mouth once a day (at bedtime)  -- Indication: For sleep aid    ascorbic acid 250 mg oral tablet  -- 1 tab(s) by mouth 3 times a day  -- Indication: For take with iron I will START or STAY ON the medications listed below when I get home from the hospital:    oxyCODONE 5 mg oral tablet  -- 1 tab(s) by mouth every 6 hours MDD:4  -- Indication: For pain as needed    Zofran ODT 4 mg oral tablet, disintegrating  -- 1 tab(s) by mouth every 6 hours, As Needed   -- Indication: For for nausea    Prenatabs Rx oral tablet  -- 1 tab(s) by mouth once a day  -- Indication: For vitamin signs     ferrous sulfate 325 mg (65 mg elemental iron) oral delayed release tablet  -- 1 tab(s) by mouth once a day   -- May discolor urine or feces.  Swallow whole.  Do not crush.    -- Indication: For Anemia    docusate sodium 100 mg oral capsule  -- 1 cap(s) by mouth 3 times a day  -- Indication: For Constipation    melatonin 3 mg oral tablet  -- 2 tab(s) by mouth once a day (at bedtime)  -- Indication: For sleep aid    ascorbic acid 250 mg oral tablet  -- 1 tab(s) by mouth 3 times a day  -- Indication: For take with iron

## 2018-09-12 LAB
ALBUMIN SERPL ELPH-MCNC: 2.1 G/DL — LOW (ref 3.3–5)
ALP SERPL-CCNC: 42 U/L — SIGNIFICANT CHANGE UP (ref 40–120)
ALT FLD-CCNC: 9 U/L — SIGNIFICANT CHANGE UP (ref 4–33)
APTT BLD: 25.8 SEC — LOW (ref 27.5–37.4)
APTT BLD: 26.4 SEC — LOW (ref 27.5–37.4)
APTT BLD: 27 SEC — LOW (ref 27.5–37.4)
AST SERPL-CCNC: 28 U/L — SIGNIFICANT CHANGE UP (ref 4–32)
BASE EXCESS BLDA CALC-SCNC: -3.7 MMOL/L — SIGNIFICANT CHANGE UP
BASE EXCESS BLDA CALC-SCNC: -3.9 MMOL/L — SIGNIFICANT CHANGE UP
BASE EXCESS BLDA CALC-SCNC: -4.1 MMOL/L — SIGNIFICANT CHANGE UP
BASE EXCESS BLDA CALC-SCNC: -4.3 MMOL/L — SIGNIFICANT CHANGE UP
BASE EXCESS BLDA CALC-SCNC: -4.3 MMOL/L — SIGNIFICANT CHANGE UP
BASE EXCESS BLDA CALC-SCNC: -4.6 MMOL/L — SIGNIFICANT CHANGE UP
BASE EXCESS BLDA CALC-SCNC: -4.8 MMOL/L — SIGNIFICANT CHANGE UP
BILIRUB SERPL-MCNC: 0.4 MG/DL — SIGNIFICANT CHANGE UP (ref 0.2–1.2)
BUN SERPL-MCNC: 21 MG/DL — SIGNIFICANT CHANGE UP (ref 7–23)
BUN SERPL-MCNC: 25 MG/DL — HIGH (ref 7–23)
CA-I BLDA-SCNC: 1.1 MMOL/L — LOW (ref 1.15–1.29)
CA-I BLDA-SCNC: 1.12 MMOL/L — LOW (ref 1.15–1.29)
CA-I BLDA-SCNC: 1.17 MMOL/L — SIGNIFICANT CHANGE UP (ref 1.15–1.29)
CALCIUM SERPL-MCNC: 7.5 MG/DL — LOW (ref 8.4–10.5)
CALCIUM SERPL-MCNC: 7.8 MG/DL — LOW (ref 8.4–10.5)
CHLORIDE SERPL-SCNC: 104 MMOL/L — SIGNIFICANT CHANGE UP (ref 98–107)
CHLORIDE SERPL-SCNC: 106 MMOL/L — SIGNIFICANT CHANGE UP (ref 98–107)
CO2 SERPL-SCNC: 18 MMOL/L — LOW (ref 22–31)
CO2 SERPL-SCNC: 18 MMOL/L — LOW (ref 22–31)
CREAT SERPL-MCNC: 2.62 MG/DL — HIGH (ref 0.5–1.3)
CREAT SERPL-MCNC: 3.39 MG/DL — HIGH (ref 0.5–1.3)
FIBRINOGEN PPP-MCNC: 478.8 MG/DL — SIGNIFICANT CHANGE UP (ref 310–510)
FIBRINOGEN PPP-MCNC: 520 MG/DL — HIGH (ref 310–510)
FIBRINOGEN PPP-MCNC: 579 MG/DL — HIGH (ref 310–510)
FIBRINOGEN PPP-MCNC: 642.4 MG/DL — HIGH (ref 310–510)
FIBRINOGEN PPP-MCNC: 724 MG/DL — HIGH (ref 310–510)
GLUCOSE BLDA-MCNC: 100 MG/DL — HIGH (ref 70–99)
GLUCOSE BLDA-MCNC: 94 MG/DL — SIGNIFICANT CHANGE UP (ref 70–99)
GLUCOSE BLDA-MCNC: 99 MG/DL — SIGNIFICANT CHANGE UP (ref 70–99)
GLUCOSE SERPL-MCNC: 87 MG/DL — SIGNIFICANT CHANGE UP (ref 70–99)
GLUCOSE SERPL-MCNC: 97 MG/DL — SIGNIFICANT CHANGE UP (ref 70–99)
HCO3 BLDA-SCNC: 21 MMOL/L — LOW (ref 22–26)
HCO3 BLDA-SCNC: 22 MMOL/L — SIGNIFICANT CHANGE UP (ref 22–26)
HCT VFR BLD CALC: 22.2 % — LOW (ref 34.5–45)
HCT VFR BLD CALC: 22.7 % — LOW (ref 34.5–45)
HCT VFR BLD CALC: 22.7 % — LOW (ref 34.5–45)
HCT VFR BLD CALC: 23.9 % — LOW (ref 34.5–45)
HCT VFR BLD CALC: 23.9 % — LOW (ref 34.5–45)
HCT VFR BLD CALC: 24.2 % — LOW (ref 34.5–45)
HCT VFR BLDA CALC: 24.1 % — LOW (ref 34.5–46.5)
HCT VFR BLDA CALC: 25.4 % — LOW (ref 34.5–46.5)
HCT VFR BLDA CALC: 25.5 % — LOW (ref 34.5–46.5)
HGB BLD-MCNC: 7.8 G/DL — LOW (ref 11.5–15.5)
HGB BLD-MCNC: 7.9 G/DL — LOW (ref 11.5–15.5)
HGB BLD-MCNC: 8 G/DL — LOW (ref 11.5–15.5)
HGB BLD-MCNC: 8.2 G/DL — LOW (ref 11.5–15.5)
HGB BLD-MCNC: 8.3 G/DL — LOW (ref 11.5–15.5)
HGB BLD-MCNC: 8.6 G/DL — LOW (ref 11.5–15.5)
HGB BLDA-MCNC: 7.7 G/DL — LOW (ref 11.5–15.5)
HGB BLDA-MCNC: 8.2 G/DL — LOW (ref 11.5–15.5)
HGB BLDA-MCNC: 8.2 G/DL — LOW (ref 11.5–15.5)
INR BLD: 0.98 — SIGNIFICANT CHANGE UP (ref 0.88–1.17)
INR BLD: 0.99 — SIGNIFICANT CHANGE UP (ref 0.88–1.17)
INR BLD: 1.01 — SIGNIFICANT CHANGE UP (ref 0.88–1.17)
LACTATE BLDA-SCNC: 0.9 MMOL/L — SIGNIFICANT CHANGE UP (ref 0.5–2)
MAGNESIUM SERPL-MCNC: 1.9 MG/DL — SIGNIFICANT CHANGE UP (ref 1.6–2.6)
MCHC RBC-ENTMCNC: 29.2 PG — SIGNIFICANT CHANGE UP (ref 27–34)
MCHC RBC-ENTMCNC: 29.3 PG — SIGNIFICANT CHANGE UP (ref 27–34)
MCHC RBC-ENTMCNC: 29.7 PG — SIGNIFICANT CHANGE UP (ref 27–34)
MCHC RBC-ENTMCNC: 29.8 PG — SIGNIFICANT CHANGE UP (ref 27–34)
MCHC RBC-ENTMCNC: 30.1 PG — SIGNIFICANT CHANGE UP (ref 27–34)
MCHC RBC-ENTMCNC: 30.2 PG — SIGNIFICANT CHANGE UP (ref 27–34)
MCHC RBC-ENTMCNC: 34.3 % — SIGNIFICANT CHANGE UP (ref 32–36)
MCHC RBC-ENTMCNC: 34.7 % — SIGNIFICANT CHANGE UP (ref 32–36)
MCHC RBC-ENTMCNC: 34.8 % — SIGNIFICANT CHANGE UP (ref 32–36)
MCHC RBC-ENTMCNC: 35.1 % — SIGNIFICANT CHANGE UP (ref 32–36)
MCHC RBC-ENTMCNC: 35.2 % — SIGNIFICANT CHANGE UP (ref 32–36)
MCHC RBC-ENTMCNC: 35.5 % — SIGNIFICANT CHANGE UP (ref 32–36)
MCV RBC AUTO: 83.5 FL — SIGNIFICANT CHANGE UP (ref 80–100)
MCV RBC AUTO: 84.2 FL — SIGNIFICANT CHANGE UP (ref 80–100)
MCV RBC AUTO: 84.6 FL — SIGNIFICANT CHANGE UP (ref 80–100)
MCV RBC AUTO: 85.3 FL — SIGNIFICANT CHANGE UP (ref 80–100)
MCV RBC AUTO: 85.7 FL — SIGNIFICANT CHANGE UP (ref 80–100)
MCV RBC AUTO: 86.9 FL — SIGNIFICANT CHANGE UP (ref 80–100)
NRBC # FLD: 0 — SIGNIFICANT CHANGE UP
PCO2 BLDA: 30 MMHG — LOW (ref 32–48)
PCO2 BLDA: 31 MMHG — LOW (ref 32–48)
PCO2 BLDA: 34 MMHG — SIGNIFICANT CHANGE UP (ref 32–48)
PCO2 BLDA: 36 MMHG — SIGNIFICANT CHANGE UP (ref 32–48)
PCO2 BLDA: 36 MMHG — SIGNIFICANT CHANGE UP (ref 32–48)
PH BLDA: 7.36 PH — SIGNIFICANT CHANGE UP (ref 7.35–7.45)
PH BLDA: 7.38 PH — SIGNIFICANT CHANGE UP (ref 7.35–7.45)
PH BLDA: 7.38 PH — SIGNIFICANT CHANGE UP (ref 7.35–7.45)
PH BLDA: 7.42 PH — SIGNIFICANT CHANGE UP (ref 7.35–7.45)
PH BLDA: 7.43 PH — SIGNIFICANT CHANGE UP (ref 7.35–7.45)
PHOSPHATE SERPL-MCNC: 6.4 MG/DL — HIGH (ref 2.5–4.5)
PLATELET # BLD AUTO: 116 K/UL — LOW (ref 150–400)
PLATELET # BLD AUTO: 118 K/UL — LOW (ref 150–400)
PLATELET # BLD AUTO: 122 K/UL — LOW (ref 150–400)
PLATELET # BLD AUTO: 127 K/UL — LOW (ref 150–400)
PLATELET # BLD AUTO: 132 K/UL — LOW (ref 150–400)
PLATELET # BLD AUTO: 135 K/UL — LOW (ref 150–400)
PMV BLD: 10.7 FL — SIGNIFICANT CHANGE UP (ref 7–13)
PMV BLD: 11 FL — SIGNIFICANT CHANGE UP (ref 7–13)
PMV BLD: 11.1 FL — SIGNIFICANT CHANGE UP (ref 7–13)
PMV BLD: 11.1 FL — SIGNIFICANT CHANGE UP (ref 7–13)
PMV BLD: 11.3 FL — SIGNIFICANT CHANGE UP (ref 7–13)
PMV BLD: 11.6 FL — SIGNIFICANT CHANGE UP (ref 7–13)
PO2 BLDA: 115 MMHG — HIGH (ref 83–108)
PO2 BLDA: 151 MMHG — HIGH (ref 83–108)
PO2 BLDA: 153 MMHG — HIGH (ref 83–108)
PO2 BLDA: 156 MMHG — HIGH (ref 83–108)
PO2 BLDA: 157 MMHG — HIGH (ref 83–108)
PO2 BLDA: 162 MMHG — HIGH (ref 83–108)
PO2 BLDA: 88 MMHG — SIGNIFICANT CHANGE UP (ref 83–108)
POTASSIUM BLDA-SCNC: 4.3 MMOL/L — SIGNIFICANT CHANGE UP (ref 3.4–4.5)
POTASSIUM BLDA-SCNC: 4.3 MMOL/L — SIGNIFICANT CHANGE UP (ref 3.4–4.5)
POTASSIUM BLDA-SCNC: 4.5 MMOL/L — SIGNIFICANT CHANGE UP (ref 3.4–4.5)
POTASSIUM SERPL-MCNC: 4.5 MMOL/L — SIGNIFICANT CHANGE UP (ref 3.5–5.3)
POTASSIUM SERPL-MCNC: 4.7 MMOL/L — SIGNIFICANT CHANGE UP (ref 3.5–5.3)
POTASSIUM SERPL-SCNC: 4.5 MMOL/L — SIGNIFICANT CHANGE UP (ref 3.5–5.3)
POTASSIUM SERPL-SCNC: 4.7 MMOL/L — SIGNIFICANT CHANGE UP (ref 3.5–5.3)
PROT SERPL-MCNC: 4.3 G/DL — LOW (ref 6–8.3)
PROTHROM AB SERPL-ACNC: 11 SEC — SIGNIFICANT CHANGE UP (ref 9.8–13.1)
PROTHROM AB SERPL-ACNC: 11.2 SEC — SIGNIFICANT CHANGE UP (ref 9.8–13.1)
PROTHROM AB SERPL-ACNC: 11.3 SEC — SIGNIFICANT CHANGE UP (ref 9.8–13.1)
RBC # BLD: 2.65 M/UL — LOW (ref 3.8–5.2)
RBC # BLD: 2.66 M/UL — LOW (ref 3.8–5.2)
RBC # BLD: 2.66 M/UL — LOW (ref 3.8–5.2)
RBC # BLD: 2.75 M/UL — LOW (ref 3.8–5.2)
RBC # BLD: 2.84 M/UL — LOW (ref 3.8–5.2)
RBC # BLD: 2.86 M/UL — LOW (ref 3.8–5.2)
RBC # FLD: 15.2 % — HIGH (ref 10.3–14.5)
RBC # FLD: 15.5 % — HIGH (ref 10.3–14.5)
RBC # FLD: 15.5 % — HIGH (ref 10.3–14.5)
RBC # FLD: 15.6 % — HIGH (ref 10.3–14.5)
RBC # FLD: 15.7 % — HIGH (ref 10.3–14.5)
RBC # FLD: 15.9 % — HIGH (ref 10.3–14.5)
SAO2 % BLDA: 96.9 % — SIGNIFICANT CHANGE UP (ref 95–99)
SAO2 % BLDA: 97.1 % — SIGNIFICANT CHANGE UP (ref 95–99)
SAO2 % BLDA: 98.1 % — SIGNIFICANT CHANGE UP (ref 95–99)
SAO2 % BLDA: 98.9 % — SIGNIFICANT CHANGE UP (ref 95–99)
SAO2 % BLDA: 99 % — SIGNIFICANT CHANGE UP (ref 95–99)
SAO2 % BLDA: 99.1 % — HIGH (ref 95–99)
SAO2 % BLDA: 99.1 % — HIGH (ref 95–99)
SODIUM BLDA-SCNC: 132 MMOL/L — LOW (ref 136–146)
SODIUM BLDA-SCNC: 134 MMOL/L — LOW (ref 136–146)
SODIUM BLDA-SCNC: 135 MMOL/L — LOW (ref 136–146)
SODIUM SERPL-SCNC: 135 MMOL/L — SIGNIFICANT CHANGE UP (ref 135–145)
SODIUM SERPL-SCNC: 138 MMOL/L — SIGNIFICANT CHANGE UP (ref 135–145)
SPECIMEN SOURCE: SIGNIFICANT CHANGE UP
SPECIMEN SOURCE: SIGNIFICANT CHANGE UP
WBC # BLD: 10.87 K/UL — HIGH (ref 3.8–10.5)
WBC # BLD: 10.97 K/UL — HIGH (ref 3.8–10.5)
WBC # BLD: 11.02 K/UL — HIGH (ref 3.8–10.5)
WBC # BLD: 14.02 K/UL — HIGH (ref 3.8–10.5)
WBC # BLD: 14.76 K/UL — HIGH (ref 3.8–10.5)
WBC # BLD: 14.93 K/UL — HIGH (ref 3.8–10.5)
WBC # FLD AUTO: 10.87 K/UL — HIGH (ref 3.8–10.5)
WBC # FLD AUTO: 10.97 K/UL — HIGH (ref 3.8–10.5)
WBC # FLD AUTO: 11.02 K/UL — HIGH (ref 3.8–10.5)
WBC # FLD AUTO: 14.02 K/UL — HIGH (ref 3.8–10.5)
WBC # FLD AUTO: 14.76 K/UL — HIGH (ref 3.8–10.5)
WBC # FLD AUTO: 14.93 K/UL — HIGH (ref 3.8–10.5)

## 2018-09-12 PROCEDURE — 99291 CRITICAL CARE FIRST HOUR: CPT

## 2018-09-12 PROCEDURE — 99292 CRITICAL CARE ADDL 30 MIN: CPT

## 2018-09-12 PROCEDURE — 71045 X-RAY EXAM CHEST 1 VIEW: CPT | Mod: 26

## 2018-09-12 RX ORDER — CALCIUM GLUCONATE 100 MG/ML
2 VIAL (ML) INTRAVENOUS ONCE
Qty: 0 | Refills: 0 | Status: COMPLETED | OUTPATIENT
Start: 2018-09-12 | End: 2018-09-12

## 2018-09-12 RX ORDER — HEPARIN SODIUM 5000 [USP'U]/ML
5000 INJECTION INTRAVENOUS; SUBCUTANEOUS EVERY 8 HOURS
Qty: 0 | Refills: 0 | Status: DISCONTINUED | OUTPATIENT
Start: 2018-09-12 | End: 2018-09-18

## 2018-09-12 RX ORDER — MIDAZOLAM HYDROCHLORIDE 1 MG/ML
2 INJECTION, SOLUTION INTRAMUSCULAR; INTRAVENOUS ONCE
Qty: 0 | Refills: 0 | Status: DISCONTINUED | OUTPATIENT
Start: 2018-09-12 | End: 2018-09-12

## 2018-09-12 RX ORDER — HYDROMORPHONE HYDROCHLORIDE 2 MG/ML
0.5 INJECTION INTRAMUSCULAR; INTRAVENOUS; SUBCUTANEOUS EVERY 4 HOURS
Qty: 0 | Refills: 0 | Status: DISCONTINUED | OUTPATIENT
Start: 2018-09-12 | End: 2018-09-18

## 2018-09-12 RX ORDER — HYDROMORPHONE HYDROCHLORIDE 2 MG/ML
0.5 INJECTION INTRAMUSCULAR; INTRAVENOUS; SUBCUTANEOUS ONCE
Qty: 0 | Refills: 0 | Status: DISCONTINUED | OUTPATIENT
Start: 2018-09-12 | End: 2018-09-12

## 2018-09-12 RX ADMIN — CHLORHEXIDINE GLUCONATE 15 MILLILITER(S): 213 SOLUTION TOPICAL at 05:01

## 2018-09-12 RX ADMIN — HYDROMORPHONE HYDROCHLORIDE 0.5 MILLIGRAM(S): 2 INJECTION INTRAMUSCULAR; INTRAVENOUS; SUBCUTANEOUS at 16:00

## 2018-09-12 RX ADMIN — CHLORHEXIDINE GLUCONATE 15 MILLILITER(S): 213 SOLUTION TOPICAL at 18:29

## 2018-09-12 RX ADMIN — HYDROMORPHONE HYDROCHLORIDE 0.5 MILLIGRAM(S): 2 INJECTION INTRAMUSCULAR; INTRAVENOUS; SUBCUTANEOUS at 16:15

## 2018-09-12 RX ADMIN — HYDROMORPHONE HYDROCHLORIDE 0.5 MILLIGRAM(S): 2 INJECTION INTRAMUSCULAR; INTRAVENOUS; SUBCUTANEOUS at 20:15

## 2018-09-12 RX ADMIN — HEPARIN SODIUM 5000 UNIT(S): 5000 INJECTION INTRAVENOUS; SUBCUTANEOUS at 21:52

## 2018-09-12 RX ADMIN — Medication 200 GRAM(S): at 06:37

## 2018-09-12 RX ADMIN — FENTANYL CITRATE 2 MICROGRAM(S)/KG/HR: 50 INJECTION INTRAVENOUS at 08:00

## 2018-09-12 RX ADMIN — HYDROMORPHONE HYDROCHLORIDE 0.5 MILLIGRAM(S): 2 INJECTION INTRAMUSCULAR; INTRAVENOUS; SUBCUTANEOUS at 20:30

## 2018-09-12 RX ADMIN — HEPARIN SODIUM 5000 UNIT(S): 5000 INJECTION INTRAVENOUS; SUBCUTANEOUS at 15:23

## 2018-09-12 RX ADMIN — SODIUM CHLORIDE 75 MILLILITER(S): 9 INJECTION, SOLUTION INTRAVENOUS at 10:11

## 2018-09-12 RX ADMIN — CHLORHEXIDINE GLUCONATE 1 APPLICATION(S): 213 SOLUTION TOPICAL at 18:29

## 2018-09-12 RX ADMIN — FENTANYL CITRATE 18.54 MICROGRAM(S)/KG/HR: 50 INJECTION INTRAVENOUS at 05:25

## 2018-09-12 RX ADMIN — FENTANYL CITRATE 18.54 MICROGRAM(S)/KG/HR: 50 INJECTION INTRAVENOUS at 08:00

## 2018-09-12 RX ADMIN — MIDAZOLAM HYDROCHLORIDE 2 MILLIGRAM(S): 1 INJECTION, SOLUTION INTRAMUSCULAR; INTRAVENOUS at 05:15

## 2018-09-12 RX ADMIN — PROPOFOL 11.12 MICROGRAM(S)/KG/MIN: 10 INJECTION, EMULSION INTRAVENOUS at 08:00

## 2018-09-12 NOTE — PROGRESS NOTE ADULT - ASSESSMENT
ASSESSMENT:  37y Female POD1 from primary cesarian section, POD 0 from re-op for hemoperitoneum, now s/p cesarian hysterectomy with total EBL 7L. Pt remains intubated and sedated.     PLAN:   Neurologic:   -sedation via propofol and fentanyl gtt     Respiratory:   -Volume AC 12/450/5/40  -f/u ABG  -will reassess for extubation in AM      Cardiovascular: s/p 9u pRBCs, 7u FFP, 4u platelets 4u cryo. Hemodynamically stable during OR, never required pressors  -hx of SVT s/p ablation in 2011, no subsequent episodes, no home medications   -After transfer to SICU yesterday- 2 more units PRBC was given (9/11/18) - None overnight     Gastrointestinal/Nutrition:   -NPO      Renal/Genitourinary:   -Strict Is&Os  -f/u BMP  -replete lytes as necessary      Hematologic:   -Continue to trend CBC last three has been-  7/20  ----> 9/25.3----> 8/22.7--->7.8/22  -pt initially thought to be in DIC, preop plt 72, PT:13.7, PTT:65.4, INR: 1.19  -Coags have since normalized: PT:12.5, PTT:29.4. INR:1.12  -Trend H/H      Infectious Disease: No acute issues   -monitor temps      Tubes/Lines/Drains:   2 R PIVs  R radial A line  Kapoor        Endocrine: No acute Issues   -monitor glucose via BMP      Disposition: SICU ASSESSMENT:  37y Female POD1 from primary cesarian section, POD 0 from re-op for hemoperitoneum, now s/p cesarian hysterectomy with total EBL 7L. Pt remains intubated and sedated.     PLAN:   Neurologic:   -sedation via propofol and fentanyl gtt     Respiratory:   -Volume AC 12/450/5/40  -f/u ABG  -will reassess for extubation in AM      Cardiovascular: s/p 9u pRBCs, 7u FFP, 4u platelets 4u cryo. Hemodynamically stable during OR, never required pressors  -hx of SVT s/p ablation in 2011, no subsequent episodes, no home medications   -After transfer to SICU yesterday- 2 more units PRBC was given (9/11/18) - None overnight     Gastrointestinal/Nutrition:   -NPO      Renal/Genitourinary: ALANIS   -LR@75 cc/hr  -Strict Is&Os  -f/u BMP  -replete lytes as necessary      Hematologic:   -Continue to trend CBC last three has been-  7/20  ----> 9/25.3----> 8/22.7--->7.8/22  -pt initially thought to be in DIC, preop plt 72, PT:13.7, PTT:65.4, INR: 1.19  -Coags have since normalized: PT:12.5, PTT:29.4. INR:1.12  -Trend H/H q8h      Infectious Disease: No acute issues   -monitor temps    DVT ppx: subq heparin    Tubes/Lines/Drains:   2 R PIVs  R radial A line  Kapoor        Endocrine: No acute Issues   -monitor glucose via BMP      Disposition: SICU ASSESSMENT:  37y Female POD1 from primary cesarian section, POD 0 from re-op for hemoperitoneum, now s/p cesarian hysterectomy with total EBL 7L. Pt remains intubated and sedated.     PLAN:   Neurologic:   -sedation via propofol and fentanyl gtt     Respiratory:   -Volume AC 12/450/5/40  -f/u ABG  -will reassess for extubation in AM      Cardiovascular: s/p 9u pRBCs, 7u FFP, 4u platelets 4u cryo. Hemodynamically stable during OR, never required pressors  -hx of SVT s/p ablation in 2011, no subsequent episodes, no home medications   -After transfer to SICU yesterday- 2 more units PRBC was given (9/11/18) - None overnight     Gastrointestinal/Nutrition:   -NPO      Renal/Genitourinary: ALANIS   -LR@75 cc/hr  -Strict Is&Os  -f/u BMP  -replete lytes as necessary      Hematologic:   -Continue to trend CBC last three has been-  7/20  ----> 9/25.3----> 8/22.7--->7.8/22  -pt initially thought to be in DIC, preop plt 72, PT:13.7, PTT:65.4, INR: 1.19  -Coags have since normalized: PT:12.5, PTT:29.4. INR:1.12  -Trend H/H q6h      Infectious Disease: No acute issues   -monitor temps    DVT ppx: subq heparin    Tubes/Lines/Drains:   2 R PIVs  R radial A line  Kapoor        Endocrine: No acute Issues   -monitor glucose via BMP      Disposition: SICU ASSESSMENT:  37y Female POD1 from primary cesarian section, POD 0 from re-op for hemoperitoneum, now s/p cesarian hysterectomy with total EBL 7L. Pt remains intubated and sedated.     PLAN:   Neurologic:   -sedation via propofol and fentanyl gtt     Respiratory:   -Volume AC 12/450/5/40  -f/u ABG  -will reassess for extubation in AM      Cardiovascular: s/p 9u pRBCs, 7u FFP, 4u platelets 4u cryo. Hemodynamically stable during OR, never required pressors  -hx of SVT s/p ablation in 2011, no subsequent episodes, no home medications   -After transfer to SICU yesterday- 2 more units PRBC was given (9/11/18) - None overnight     Gastrointestinal/Nutrition:   -NPO      Renal/Genitourinary: ALANIS   -LR@75 cc/hr  -Strict Is&Os  -f/u BMP  -replete lytes as necessary      Hematologic:   -Continue to trend CBC last three has been-  7/20  ----> 9/25.3----> 8/22.7--->7.8/22  -pt initially thought to be in DIC, preop plt 72, PT:13.7, PTT:65.4, INR: 1.19  -Coags have since normalized: PT:12.5, PTT:29.4. INR:1.12  -Trend H/H q6h      Infectious Disease: No acute issues   -monitor temps    DVT ppx: subq heparin    Tubes/Lines/Drains:   2 R PIVs  R radial A line  Kapoor    Endocrine: No acute Issues   -monitor glucose via BMP      Disposition: SICU    Critical Care diagnosis  Hemorrhage ASSESSMENT:  37y Female POD1 from primary cesarian section, POD 0 from re-op for hemoperitoneum, now s/p cesarian hysterectomy with total EBL 7L. Pt remains intubated and sedated.     PLAN:   Neurologic:   -sedation via propofol and fentanyl gtt     Respiratory:   -Volume AC 12/450/5/40  -f/u ABG  -will reassess for extubation in AM      Cardiovascular: s/p 9u pRBCs, 7u FFP, 4u platelets 4u cryo. Hemodynamically stable during OR, never required pressors  -hx of SVT s/p ablation in 2011, no subsequent episodes, no home medications   -After transfer to SICU yesterday- 2 more units PRBC was given (9/11/18) - None overnight     Gastrointestinal/Nutrition:   -NPO      Renal/Genitourinary: ALANIS   -LR@75 cc/hr  -Strict Is&Os  -f/u BMP  -replete lytes as necessary      Hematologic:   -Continue to trend CBC last three has been-  7/20  ----> 9/25.3----> 8/22.7--->7.8/22  -pt initially thought to be in DIC, preop plt 72, PT:13.7, PTT:65.4, INR: 1.19  -Coags have since normalized: PT:12.5, PTT:29.4. INR:1.12  -Trend H/H q6h      Infectious Disease: No acute issues   -monitor temps    DVT ppx: subq heparin    Tubes/Lines/Drains:   2 R PIVs  R radial A line  Kapoor    Endocrine: No acute Issues   -monitor glucose via BMP      Disposition: SICU    Critical Care diagnosis  Hemorrhagic shock  ALANIS

## 2018-09-12 NOTE — CHART NOTE - NSCHARTNOTEFT_GEN_A_CORE
R4 GYN ONC PM Note:    Patient seen and examined. Currently extubated and appears well. Holding baby for the first time. Denies CP, SOB, N, V, F, Ch. Denies VB.     ICU Vital Signs Last 24 Hrs  T(C): 36.6 (12 Sep 2018 12:00), Max: 43 (11 Sep 2018 20:00)  T(F): 97.8 (12 Sep 2018 12:00), Max: 109.4 (11 Sep 2018 20:00)  HR: 125 (12 Sep 2018 14:55) (66 - 125)  ABP: 141/72 (12 Sep 2018 14:00) (94/54 - 148/78)  ABP(mean): 96 (12 Sep 2018 14:00) (67 - 105)  RR: 22 (12 Sep 2018 14:55) (10 - 24)  SpO2: 95% (12 Sep 2018 14:55) (94% - 100%)    Physical Exam:  GEN: NAD, AAO x3   Abd: softly distended, appropriately tender, incision c/d/i  BRISEIDA: serosanguinous scant drainage  Extr: NTBL, SCD in place  Pelvic: No VB on pad                                     8.6    14.02 )-----------( 127      ( 12 Sep 2018 12:00 )             24.2                           7.9    10.87 )-----------( 118      ( 12 Sep 2018 06:00 )             22.7                7.8    10.97 )-----------( 122      ( 12 Sep 2018 02:05 )             22.2                           8.0    11.02 )-----------( 116      ( 12 Sep 2018 00:05 )             22.7                           9.0    11.07 )-----------( 124      ( 11 Sep 2018 19:27 )             25.3     37  POD#1 from RTOR for intraabdominal hemorrhage complicated by DIC and resulting in emergency hysterectomy, now in SICU, extubated, H/H stable, and hemodynamically stable. GYN ONC will continue to follow. Appreciate SICU care.     Josué VALLE d/w Dr. Walters GYN ONC R4 GYN ONC PM Note:    Patient seen and examined. Currently extubated and appears well. Holding baby for the first time. Denies CP, SOB, N, V, F, Ch. Denies VB.     ICU Vital Signs Last 24 Hrs  T(C): 36.6 (12 Sep 2018 12:00), Max: 43 (11 Sep 2018 20:00)  T(F): 97.8 (12 Sep 2018 12:00), Max: 109.4 (11 Sep 2018 20:00)  HR: 125 (12 Sep 2018 14:55) (66 - 125)  ABP: 141/72 (12 Sep 2018 14:00) (94/54 - 148/78)  ABP(mean): 96 (12 Sep 2018 14:00) (67 - 105)  RR: 22 (12 Sep 2018 14:55) (10 - 24)  SpO2: 95% (12 Sep 2018 14:55) (94% - 100%)      11 Sep 2018 07:01  -  12 Sep 2018 07:00  --------------------------------------------------------  IN:    fentaNYL  Infusion: 223.2 mL    fentaNYL  Infusion: 46.5 mL    IV PiggyBack: 100 mL    lactated ringers.: 2125 mL    Other: 11200 mL    Packed Red Blood Cells: 600 mL    propofol Infusion: 108.5 mL    propofol Infusion: 300.2 mL  Total IN: 63813.4 mL    OUT:    Bulb: 865 mL    Estimated Blood Loss: 5500 mL    Indwelling Catheter - Urethral: 1100 mL  Total OUT: 7465 mL    Total NET: 7034.4 mL      12 Sep 2018 07:01  -  12 Sep 2018 18:16  --------------------------------------------------------  IN:    fentaNYL  Infusion: 51 mL    lactated ringers.: 575 mL    propofol Infusion: 55.6 mL  Total IN: 681.6 mL    OUT:    Indwelling Catheter - Urethral: 680 mL  Total OUT: 680 mL    Total NET: 1.6 mL    Physical Exam:  GEN: NAD, AAO x3   Abd: softly distended, appropriately tender, incision c/d/i  BRISEIDA: serosanguinous scant drainage  Extr: NTBL, SCD in place  Pelvic: No VB on pad                                     8.6    14.02 )-----------( 127      ( 12 Sep 2018 12:00 )             24.2                           7.9    10.87 )-----------( 118      ( 12 Sep 2018 06:00 )             22.7                7.8    10.97 )-----------( 122      ( 12 Sep 2018 02:05 )             22.2                           8.0    11.02 )-----------( 116      ( 12 Sep 2018 00:05 )             22.7                           9.0    11.07 )-----------( 124      ( 11 Sep 2018 19:27 )             25.3     37  POD#1 from RTOR for intraabdominal hemorrhage complicated by DIC and resulting in emergency hysterectomy, now in SICU, extubated, H/H stable, and hemodynamically stable. GYN ONC will continue to follow. Appreciate SICU care.     Josué VALLE d/w Dr. Walters GYN ONC

## 2018-09-12 NOTE — CHART NOTE - NSCHARTNOTEFT_GEN_A_CORE
Att note (Late entry)  I saw the pat on 9/11 at 20:30 together with ICU Att physician.  Pat appears hemodynamically stable; U/O 50cc/hr,  BRISEIDA drain 75cc over the last hour (significantly lower then the hour prior)  Coagulation profile WNL; No significant drop in Hct  At this time no intervention necessary; Continue monitoring                        _J-P drain, Coagulation, CBC, VS as long as stable will continue conserv management

## 2018-09-12 NOTE — PROGRESS NOTE ADULT - PROBLEM SELECTOR PLAN 1
Neuro: SKYLAR 0, currently on fentanyl and propofol gtt  CV: hemodynamically stable, not requiring pressor support  Pulm: intubated with vent settings stable at 12/450/40/5  GI: NPO  : ALANIS, likely prerenal given patient s/p hemorrhage.  Continue to monitor urine output (benz catheter in place) and creatinine  Heme: SCDs for DVT ppx.  H/H stable s/p intraop massive transfusion and post-op 2uPRBC transfusion.  Coagulopathy has improved.  Continue to monitor labs q 4 hours and for physical exam findings suggestive of continued bleeding.  Dispo: appreciate SICU care    FITO Punete PGY3

## 2018-09-12 NOTE — PROGRESS NOTE ADULT - ATTENDING COMMENTS
Seen in SICU with F and PA. D/W SICU team members. Flow and Labs reviewed. Agree with assessment and plan.

## 2018-09-12 NOTE — CHART NOTE - NSCHARTNOTEFT_GEN_A_CORE
Bedside lung ultrasound performed. B lines visualized bilaterally.      C.Dominik PGY-1 Bedside lung ultrasound performed. No B lines visualized bilaterally.      Roney PGY-1

## 2018-09-12 NOTE — PROGRESS NOTE ADULT - SUBJECTIVE AND OBJECTIVE BOX
R3 Gyn oncology    38 yo  POD#2 s/p pLTCS for non-reassuring fetal heart tracing and POD#1 s/p return to OR for intraabdominal hemorrhage, Gyn Onc consult called for continued bleeding due to DIC and hysterectomy was performed with a total EBL of 7L.     Pt seen and examined at bedside. Patient intubated and sedated, however arousable and responsive to commands. Overnight VS remained stable and UOP inproved. BRISEIDA drain with decreased output over the course of the night.    MEDICATIONS  (STANDING):  chlorhexidine 0.12% Liquid 15 milliLiter(s) Swish and Spit two times a day  chlorhexidine 4% Liquid 1 Application(s) Topical <User Schedule>  fentaNYL   Infusion 2 MICROgram(s)/kG/Hr (18.54 mL/Hr) IV Continuous <Continuous>  influenza   Vaccine 0.5 milliLiter(s) IntraMuscular once  lactated ringers. 1000 milliLiter(s) (125 mL/Hr) IV Continuous <Continuous>  propofol Infusion 20 MICROgram(s)/kG/Min (11.124 mL/Hr) IV Continuous <Continuous>    MEDICATIONS  (PRN):        Vital Signs Last 24 Hrs  T(C): 36.1 (12 Sep 2018 04:00), Max: 43 (11 Sep 2018 20:00)  T(F): 97 (12 Sep 2018 04:00), Max: 109.4 (11 Sep 2018 20:00)  HR: 66 (12 Sep 2018 07:00) (65 - 104)  BP: 113/52 (11 Sep 2018 15:35) (97/60 - 113/52)  BP(mean): 67 (11 Sep 2018 15:35) (67 - 76)  RR: 13 (12 Sep 2018 07:00) (10 - 26)  SpO2: 96% (12 Sep 2018 07:00) (94% - 99%)     @ 07:  -  -12 @ 07:00  --------------------------------------------------------  IN: 85304.4 mL / OUT: 7465 mL / NET: 7034.4 mL        PHYSICAL EXAM:    Gen: NAD, A+0 x 3  CV: RRR  Pulm: bibasilar crackles  Abd: Soft, non  tender,  mildly distended, no rebound or guarding, +BS  Incision: Clean, dry and intact; dressing in place  Extremities: No swelling or calf tenderness, SCDs in place    Labs, additional tests:             7.9    10.87 )-----------( 118      (  @ 06:00 )             22.7                7.8    10.97 )-----------( 122      (  @ 02:05 )             22.2                8.0    11.02 )-----------( 116      (  @ 00:05 )             22.7                9.0    11.07 )-----------( 124      (  @ 19:27 )             25.3                7.0    11.73 )-----------( 116      (  @ 15:27 )             20.0                7.0    12.67 )-----------( 129      (  @ 13:00 )             20.0                8.8    18.46 )-----------( 79       (  @ 11:28 )             25.3                8.0    17.10 )-----------( 69       (  @ 09:43 )             22.9                8.4    18.50 )-----------( 75       (  @ 09:00 )             24.5                6.9    18.46 )-----------( 72       (  @ 04:10 )             20.5                8.1    22.70 )-----------( 101      (  @ 00:45 )             24.6                10.8   11.82 )-----------( 191      ( 09-10 @ 18:16 )             32.8           135  |  104  |  21  ----------------------------<  97  4.7   |  18<L>  |  2.62<H>    Ca    7.5<L>      12 Sep 2018 02:05  Phos  6.4       Mg     1.9         TPro  4.3<L>  /  Alb  2.1<L>  /  TBili  0.4  /  DBili  x   /  AST  28  /  ALT  9   /  AlkPhos  42

## 2018-09-12 NOTE — PROGRESS NOTE ADULT - PROBLEM SELECTOR PLAN 1
Neuro: arousable to verbal stimuli, follows commands  CV: hemodynamically stable, not requiring pressor support  Pulm: intubated on vent, plan for weaning today per SICU  GI: NPO  : ALANIS, likely prerenal given patient s/p hemorrhage.  Continue to monitor urine output (benz catheter in place) and creatinine  Heme: SCDs for DVT ppx.  H/H stable s/p intraop massive transfusion and post-op 2uPRBC transfusion.  Coagulopathy has improved.  Continue to monitor labs and PE  Dispo: appreciate SICU care  Gyn onc will continue to follow  [email or web address]

## 2018-09-12 NOTE — PROGRESS NOTE ADULT - ATTENDING COMMENTS
Agree with above  Pt seen and examined  Pt remains intubated and sedated  BP - 94/54; Hr - 71, afebrile  Dressing - clean/dry/intact  BRISEIDA - output 0 cc for the past 2 hours  U/O 40-60 cc/hr  Hg was 9 (7:30 pm) now 8 (12 am); Coags - WNL; PLT - 116 (was 124 at 7:30 pm)    A/P Pt S/P C/S; C-hys; Blood tx.  Pt in stable condition.    Rec:  1) Cont with present mangement  2) Repeat labs in am  3) Stict I and O's  4) Cont to wean pt from vent  5) DVT prophylaxis  Will follow  MARK ANTHONY Callahan

## 2018-09-12 NOTE — PROGRESS NOTE ADULT - ATTENDING COMMENTS
OB Attg Late entry from 7am  Pt seen and examined by me.  Improvement in urine output and BRISEIDA drain output noted.  SICU management appreciated.  Agree with assessment and plan.

## 2018-09-12 NOTE — PROGRESS NOTE ADULT - ATTENDING COMMENTS
37y Female POD2 from primary cesarian section, POD 1 from re-op for hemoperitoneum, now s/p cesarian hysterectomy with total EBL 7L. Pt remains intubated.     PLAN:   Neurologic:   -off all sedation, mentating well    Respiratory:   -CPAP  -Cuff leak +  -ABGs stable  -plan for extubation      Cardiovascular: s/p 9u pRBCs, 7u FFP, 4u platelets 4u cryo. Hemodynamically stable during OR, never required pressors  -hx of SVT s/p ablation in 2011, no subsequent episodes, no home medications   -After transfer to SICU - 2 more units PRBC was given (9/11/18) - None overnight       Gastrointestinal/Nutrition:   -NPO      Renal/Genitourinary: ALANIS   -LR@75 cc/hr  -Strict Is&Os  -Cr: 2.61, baseline 0.66. Likely due to large volume blood loss intraop. Will continue to trend  -BMP Q8H  -replete lytes as necessary      Hematologic:   -Continue to trend CBC last three has been-  7/20  ----> 9/25.3----> 8/22.7--->7.8/22--->806/24.2  -pt initially thought to be in DIC, preop plt 72, PT:13.7, PTT:65.4, INR: 1.19  -Coags have since normalized  -Trend H/H q8h      Infectious Disease: No acute issues   -monitor temps    DVT ppx: subq heparin    Tubes/Lines/Drains:   2 R PIVs  R radial A line  [place]    Endocrine: No acute Issues   -monitor glucose via BMP      Disposition: SICU    Critical Care diagnosis  Hemorrhagic shock  ALANIS  The patient is a critical care patient with life threatening hemodynamic and metabolic instability in SICU.  I have personally interviewed and examined the patient, reviewed data and laboratory tests/x-rays and all pertinent electronic images.  The SICU team has a constant risk benefit analyzes discussion with the primary team, all consultants, House Staff and PA's on all decisions.  These diagnoses are unrelated to the surgical procedure noted above.  I met with family if needed to get further history, discuss the case and make care decisions for this patient who might not be able to participate.  Time involved in performance of separately billable procedures was not counted toward my critical care time. There is no overlap.  I spent 55-75 minutes  in total providing critical care for the diagnoses, assessment and plan above.

## 2018-09-12 NOTE — PROGRESS NOTE ADULT - SUBJECTIVE AND OBJECTIVE BOX
SICU PM PROGRESS NOTE  ===============================  Interval Events: Pt remains intubated, off sedation, awake and mentating well, able to communicate via writing. Pt has remained hemodynamically stable, no further transfusions needed, H&H stable. BRISEIDA drain output minimal since midnight. UOP greatly improved, >100cc/hr over past 3 hours. Pt is tolerating CPAP well, ABGs stable.     HPI: 37y female PMHx of SVT s/p ablation in 2011 POD1 from primary cesarian section,  +500 PPH. Pt noted to be anuric since 11pm 9/10 despite fluid resuscitation and 2upRBCs, H/H: 6.9/20.5, plt 72, FAST +. Pt taken emergently to the OR. Small R uterine extension and bleeding from the posterior wall were noted. Bleeding was controlled and a BRISEIDA drain was placed. Incision was closed. Continuous oozing was noted from the drain site and decision was made to reopen the incision. Generalized bleeding was noted from the posterior uterine wall and adnexa and the decision was made to perform a hysterectomy and bilateral salpingectomy. Total EBL 7L.  during case  Pt remained intubated. Transferred to the SICU for hemodynamic monitoring.     ICU Vital Signs Last 24 Hrs  T(C): 36.6 (12 Sep 2018 12:00), Max: 43 (11 Sep 2018 20:00)  T(F): 97.8 (12 Sep 2018 12:00), Max: 109.4 (11 Sep 2018 20:00)  HR: 98 (12 Sep 2018 13:00) (65 - 99)  BP: 113/52 (11 Sep 2018 15:35) (113/52 - 113/52)  BP(mean): 67 (11 Sep 2018 15:35) (67 - 67)  ABP: 148/78 (12 Sep 2018 13:00) (94/54 - 148/78)  ABP(mean): 105 (12 Sep 2018 13:00) (67 - 105)  RR: 24 (12 Sep 2018 13:00) (10 - 24)  SpO2: 99% (12 Sep 2018 13:00) (94% - 100%)    --------------------------------------------------------------------------------------      09-11 @ 07:01 - 09-12 @ 07:00  --------------------------------------------------------  IN:    fentaNYL  Infusion: 223.2 mL    fentaNYL  Infusion: 46.5 mL    IV PiggyBack: 100 mL    lactated ringers.: 2125 mL    Other: 90119 mL    Packed Red Blood Cells: 600 mL    propofol Infusion: 108.5 mL    propofol Infusion: 300.2 mL  Total IN: 06634.4 mL    OUT:    Bulb: 865 mL    Estimated Blood Loss: 5500 mL    Indwelling Catheter - Urethral: 1100 mL  Total OUT: 7465 mL    Total NET: 7034.4 mL      09-12 @ 07:01 - 09-12 @ 14:32  --------------------------------------------------------  IN:    fentaNYL  Infusion: 51 mL    lactated ringers.: 575 mL    propofol Infusion: 55.6 mL  Total IN: 681.6 mL    OUT:    Indwelling Catheter - Urethral: 680 mL  Total OUT: 680 mL    Total NET: 1.6 mL          Eden Pan PGY-1  Pager #: [phone]     N/A    Female  --------------------------------------------------------------------------------------    EXAM  NEUROLOGY  GCS 14, off sedation, mentating well    HEENT  Exam: Normocephalic, atraumatic.     RESPIRATORY  Exam: Clear to auscultation bilaterally  Mechanical Ventilation: CPAP    CARDIOVASCULAR  Exam: S1, S2.  Regular rate and rhythm.  Moderate peripheral edema     GI/NUTRITION  Exam: Abdomen soft, bandage in place, CDI,, BRISEIDA drain from R abdomen  Current Diet:  NPO     VASCULAR  Exam: Extremities warm, pink, well-perfused.       SKIN:  Exam: Good skin turgor, no skin breakdown.      METABOLIC/FLUIDS/ELECTROLYTES  lactated ringers. 1000 milliLiter(s) IV Continuous <Continuous>      HEMATOLOGIC  [x] DVT Prophylaxis:   Transfusions:	[x] 9u PRBC	[x] 4u Platelets    [x] 7u FFP	[x] 4u Cryoprecipitate    INFECTIOUS DISEASE  Antimicrobials/Immunologic Medications:  influenza   Vaccine 0.5 milliLiter(s) IntraMuscular once        Tubes/Lines/Drains  ***  [x] Peripheral IV  [] Central Venous Line     	[] R	[] L	[] IJ	[] Fem	[] SC	Date Placed:   [x] Arterial Line		[x] R	[] L	[] Fem	[x] Rad	[] Ax	Date Placed: 9/11  [] PICC:         	[] Midline		[] Mediport  [x] Urinary Catheter		Date Placed:     LABS  -------------------------------------------------------------------------------------                                            8.6                   Neurophils% (auto):   x      (09-12 @ 12:00):    14.02)-----------(127          Lymphocytes% (auto):  x                                             24.2                   Eosinphils% (auto):   x        Manual%: Neutrophils x    ; Lymphocytes x    ; Eosinophils x    ; Bands%: x    ; Blasts x          09-12    135  |  104  |  21  ----------------------------<  97  4.7   |  18<L>  |  2.62<H>    Ca    7.5<L>      12 Sep 2018 02:05  Phos  6.4     09-12  Mg     1.9     09-12    TPro  4.3<L>  /  Alb  2.1<L>  /  TBili  0.4  /  DBili  x   /  AST  28  /  ALT  9   /  AlkPhos  42  09-12    ( 09-12 @ 06:00 )   PT: 11.0 SEC;   INR: 0.99   aPTT: 25.8 SEC    ABG - ( 12 Sep 2018 12:00 )  pH: 7.42  /  pCO2: 31    /  pO2: 156   / HCO3: 21    / Base Excess: -4.3  /  SaO2: 99.1  / Lactate: x          RECENT CULTURES:  09-11 @ 04:28 BLOOD VENOUS         NO ORGANISMS ISOLATED  NO ORGANISMS ISOLATED AT 24 HOURS SICU PM PROGRESS NOTE  ===============================  Interval Events: Pt successfully extubated to face mask, awake and mentating well. Pt has remained hemodynamically stable, no further transfusions needed, H&H stable. BRISEIDA drain output minimal since midnight. UOP greatly improved, >100cc/hr over past 3 hours.     HPI: 37y female PMHx of SVT s/p ablation in 2011 POD1 from primary cesarian section,  +500 PPH. Pt noted to be anuric since 11pm 9/10 despite fluid resuscitation and 2upRBCs, H/H: 6.9/20.5, plt 72, FAST +. Pt taken emergently to the OR. Small R uterine extension and bleeding from the posterior wall were noted. Bleeding was controlled and a BRISEDIA drain was placed. Incision was closed. Continuous oozing was noted from the drain site and decision was made to reopen the incision. Generalized bleeding was noted from the posterior uterine wall and adnexa and the decision was made to perform a hysterectomy and bilateral salpingectomy. Total EBL 7L.  during case Transferred to the SICU for hemodynamic monitoring.     ICU Vital Signs Last 24 Hrs  T(C): 36.6 (12 Sep 2018 12:00), Max: 43 (11 Sep 2018 20:00)  T(F): 97.8 (12 Sep 2018 12:00), Max: 109.4 (11 Sep 2018 20:00)  HR: 98 (12 Sep 2018 13:00) (65 - 99)  BP: 113/52 (11 Sep 2018 15:35) (113/52 - 113/52)  BP(mean): 67 (11 Sep 2018 15:35) (67 - 67)  ABP: 148/78 (12 Sep 2018 13:00) (94/54 - 148/78)  ABP(mean): 105 (12 Sep 2018 13:00) (67 - 105)  RR: 24 (12 Sep 2018 13:00) (10 - 24)  SpO2: 99% (12 Sep 2018 13:00) (94% - 100%)    --------------------------------------------------------------------------------------      09-11 @ 07:01 - 09-12 @ 07:00  --------------------------------------------------------  IN:    fentaNYL  Infusion: 223.2 mL    fentaNYL  Infusion: 46.5 mL    IV PiggyBack: 100 mL    lactated ringers.: 2125 mL    Other: 46460 mL    Packed Red Blood Cells: 600 mL    propofol Infusion: 108.5 mL    propofol Infusion: 300.2 mL  Total IN: 62804.4 mL    OUT:    Bulb: 865 mL    Estimated Blood Loss: 5500 mL    Indwelling Catheter - Urethral: 1100 mL  Total OUT: 7465 mL    Total NET: 7034.4 mL      09-12 @ 07:01 - 09-12 @ 14:32  --------------------------------------------------------  IN:    fentaNYL  Infusion: 51 mL    lactated ringers.: 575 mL    propofol Infusion: 55.6 mL  Total IN: 681.6 mL    OUT:    Indwelling Catheter - Urethral: 680 mL  Total OUT: 680 mL    Total NET: 1.6 mL          Eden Pan PGY-1  Pager #: [phone]     N/A    Female  --------------------------------------------------------------------------------------    EXAM  NEUROLOGY  GCS 14, off sedation, mentating well    HEENT  Exam: Normocephalic, atraumatic.     RESPIRATORY  Exam: Clear to auscultation bilaterally  Mechanical Ventilation: CPAP    CARDIOVASCULAR  Exam: S1, S2.  Regular rate and rhythm.  Moderate peripheral edema     GI/NUTRITION  Exam: Abdomen soft, bandage in place, CDI,, BRISEIDA drain from R abdomen  Current Diet:  NPO     VASCULAR  Exam: Extremities warm, pink, well-perfused.       SKIN:  Exam: Good skin turgor, no skin breakdown.      METABOLIC/FLUIDS/ELECTROLYTES  lactated ringers. 1000 milliLiter(s) IV Continuous <Continuous>      HEMATOLOGIC  [x] DVT Prophylaxis:   Transfusions:	[x] 9u PRBC	[x] 4u Platelets    [x] 7u FFP	[x] 4u Cryoprecipitate    INFECTIOUS DISEASE  Antimicrobials/Immunologic Medications:  influenza   Vaccine 0.5 milliLiter(s) IntraMuscular once        Tubes/Lines/Drains  ***  [x] Peripheral IV  [] Central Venous Line     	[] R	[] L	[] IJ	[] Fem	[] SC	Date Placed:   [x] Arterial Line		[x] R	[] L	[] Fem	[x] Rad	[] Ax	Date Placed: 9/11  [] PICC:         	[] Midline		[] Mediport  [x] Urinary Catheter		Date Placed:     LABS  -------------------------------------------------------------------------------------                                            8.6                   Neurophils% (auto):   x      (09-12 @ 12:00):    14.02)-----------(127          Lymphocytes% (auto):  x                                             24.2                   Eosinphils% (auto):   x        Manual%: Neutrophils x    ; Lymphocytes x    ; Eosinophils x    ; Bands%: x    ; Blasts x          09-12    135  |  104  |  21  ----------------------------<  97  4.7   |  18<L>  |  2.62<H>    Ca    7.5<L>      12 Sep 2018 02:05  Phos  6.4     09-12  Mg     1.9     09-12    TPro  4.3<L>  /  Alb  2.1<L>  /  TBili  0.4  /  DBili  x   /  AST  28  /  ALT  9   /  AlkPhos  42  09-12    ( 09-12 @ 06:00 )   PT: 11.0 SEC;   INR: 0.99   aPTT: 25.8 SEC    ABG - ( 12 Sep 2018 12:00 )  pH: 7.42  /  pCO2: 31    /  pO2: 156   / HCO3: 21    / Base Excess: -4.3  /  SaO2: 99.1  / Lactate: x          RECENT CULTURES:  09-11 @ 04:28 BLOOD VENOUS         NO ORGANISMS ISOLATED  NO ORGANISMS ISOLATED AT 24 HOURS

## 2018-09-12 NOTE — PROGRESS NOTE ADULT - SUBJECTIVE AND OBJECTIVE BOX
R3 OB Postpartum Note    Patient seen and examined at bedside in SICU with Dr Callahan.  No acute events.  Patient sedated on fentanyl and propofol drips but accusable by voice and touch. Patient remains intubated.  SCDs in place.    Physical exam:    Vital Signs Last 24 Hrs  T(C): 36.7 (12 Sep 2018 00:00), Max: 43 (11 Sep 2018 20:00)  T(F): 98 (12 Sep 2018 00:00), Max: 109.4 (11 Sep 2018 20:00)  HR: 76 (12 Sep 2018 00:00) (65 - 125)  BP: 113/52 (11 Sep 2018 15:35) (91/63 - 113/52)  BP(mean): 67 (11 Sep 2018 15:35) (59 - 90)  RR: 11 (12 Sep 2018 00:00) (10 - 26)  SpO2: 94% (12 Sep 2018 00:00) (94% - 99%)    Vent Settings: 12/450/40/5    Gen: NAD  CV RRR S1S2  Pulm diffuse course lung sounds with ventilator   Abdomen: Softly distended, no bowel sounds, dressing clean and dry, R BRISEIDA with minimal serosanguinous drainage  Pelvic: Normal lochia noted  Ext: No calf tenderness, 2+ pitting edema b/l    LABS:                        8.0    11.02 )-----------( 116      ( 12 Sep 2018 00:05 )             22.7                         9.0    11.07 )-----------( 124      ( 11 Sep 2018 19:27 )             25.3                         7.0    11.73 )-----------( 116      ( 11 Sep 2018 15:27 )             20.0                         7.0    12.67 )-----------( 129      ( 11 Sep 2018 13:00 )             20.0                         8.8    18.46 )-----------( 79       ( 11 Sep 2018 11:28 )             25.3                         8.0    17.10 )-----------( 69       ( 11 Sep 2018 09:43 )             22.9                         8.4    18.50 )-----------( 75       ( 11 Sep 2018 09:00 )             24.5                         6.9    18.46 )-----------( 72       ( 11 Sep 2018 04:10 )             20.5                         8.1    22.70 )-----------( 101      ( 11 Sep 2018 00:45 )             24.6                         10.8   11.82 )-----------( 191      ( 10 Sep 2018 18:16 )             32.8     Magnesium, Serum: 1.8 mg/dL (09-11 @ 19:27)  Magnesium, Serum: 1.4 mg/dL (09-11 @ 15:27)    09-11-18 @ 19:27      135  |  106  |  19  ----------------------------<  113<H>  5.4<H>   |  19<L>  |  2.11<H>    09-11-18 @ 15:27      137  |  106  |  17  ----------------------------<  119<H>  4.9   |  20<L>  |  1.92<H>    09-11-18 @ 13:00      137  |  104  |  15  ----------------------------<  146<H>  6.4<HH>   |  17<L>  |  1.63<H>    09-11-18 @ 09:00      133<L>  |  104  |  17  ----------------------------<  88  5.4<H>   |  20<L>  |  1.75<H>    09-11-18 @ 04:10      134<L>  |  103  |  14  ----------------------------<  89  4.7   |  18<L>  |  1.31<H>    09-11-18 @ 01:20      136  |  102  |  12  ----------------------------<  93  4.2   |  17<L>  |  1.19    09-10-18 @ 18:16      134<L>  |  105  |  7   ----------------------------<  66<L>  4.0   |  19<L>  |  0.66        Ca    7.8<L>      11 Sep 2018 19:27  Ca    8.3<L>      11 Sep 2018 15:27  Ca    8.1<L>      11 Sep 2018 13:00  Ca    7.6<L>      11 Sep 2018 09:00  Ca    7.8<L>      11 Sep 2018 04:10  Ca    7.9<L>      11 Sep 2018 01:20  Ca    8.5      10 Sep 2018 18:16  Phos  6.2<H>     09-11  Phos  5.7<H>     09-11  Mg     1.8     09-11  Mg     1.4<L>     09-11    TPro  4.3<L>  /  Alb  2.4<L>  /  TBili  1.7<H>  /  DBili  x   /  AST  27  /  ALT  13  /  AlkPhos  45  09-11-18 @ 15:27  TPro  4.7<L>  /  Alb  2.6<L>  /  TBili  1.8<H>  /  DBili  x   /  AST  24  /  ALT  13  /  AlkPhos  46  09-11-18 @ 13:00  TPro  4.7<L>  /  Alb  2.4<L>  /  TBili  1.2  /  DBili  x   /  AST  43<H>  /  ALT  10  /  AlkPhos  74  09-11-18 @ 09:00  TPro  4.6<L>  /  Alb  2.2<L>  /  TBili  2.3<H>  /  DBili  x   /  AST  42<H>  /  ALT  10  /  AlkPhos  77  09-11-18 @ 04:10  TPro  4.7<L>  /  Alb  2.4<L>  /  TBili  2.6<H>  /  DBili  x   /  AST  44<H>  /  ALT  10  /  AlkPhos  89  09-11-18 @ 01:20  TPro  6.2  /  Alb  2.7<L>  /  TBili  0.6  /  DBili  x   /  AST  19  /  ALT  8   /  AlkPhos  109  09-10-18 @ 18:16

## 2018-09-12 NOTE — PROGRESS NOTE ADULT - ASSESSMENT
38 yo  POD#2 s/p pLTCS for non-reassuring fetal heart tracing and POD#1 s/p return to OR for intraabdominal hemorrhage and hysterectomy with a total EBL of 7L.  Patient intubated however clinical status improving. She is hemodynamically stable, not requiring pressor support, with improvement in UOP and stable Hct throughout the shift.

## 2018-09-12 NOTE — PROGRESS NOTE ADULT - ATTENDING COMMENTS
37y Female POD1 from primary cesarian section, POD 0 from re-op for hemoperitoneum, now s/p cesarian hysterectomy with total EBL 7L. Pt remains intubated and sedated.     PLAN:   Neurologic:   -sedation via propofol and fentanyl gtt     Respiratory:   -Volume AC 12/450/5/40  -f/u ABG  -will reassess for extubation in AM      Cardiovascular: s/p 9u pRBCs, 7u FFP, 4u platelets 4u cryo. Hemodynamically stable during OR, never required pressors  -hx of SVT s/p ablation in 2011, no subsequent episodes, no home medications   -After transfer to SICU yesterday- 2 more units PRBC was given (9/11/18) - None overnight     Gastrointestinal/Nutrition:   -NPO      Renal/Genitourinary: ALANIS   -LR@75 cc/hr  -Strict Is&Os  -f/u BMP  -replete lytes as necessary      Hematologic:   -Continue to trend CBC last three has been-  7/20  ----> 9/25.3----> 8/22.7--->7.8/22  -pt initially thought to be in DIC, preop plt 72, PT:13.7, PTT:65.4, INR: 1.19  -Coags have since normalized: PT:12.5, PTT:29.4. INR:1.12  -Trend H/H q6h      Infectious Disease: No acute issues   -monitor temps    DVT ppx: subq heparin    Tubes/Lines/Drains:   2 R PIVs  R radial A line  Kapoor    Endocrine: No acute Issues   -monitor glucose via BMP      Disposition: SICU    Critical Care diagnosis  Hemorrhagic shock  ALANIS  The patient is a critical care patient with life threatening hemodynamic and metabolic instability in SICU.  I have personally interviewed and examined the patient, reviewed data and laboratory tests/x-rays and all pertinent electronic images.  The SICU team has a constant risk benefit analyzes discussion with the primary team, all consultants, House Staff and PA's on all decisions.  These diagnoses are unrelated to the surgical procedure noted above.  I met with family if needed to get further history, discuss the case and make care decisions for this patient who might not be able to participate.  Time involved in performance of separately billable procedures was not counted toward my critical care time. There is no overlap.  I spent 55-75 minutes  in total providing critical care for the diagnoses, assessment and plan above.

## 2018-09-12 NOTE — PROGRESS NOTE ADULT - ASSESSMENT
ASSESSMENT:  37y Female POD2 from primary cesarian section, POD 1 from re-op for hemoperitoneum, now s/p cesarian hysterectomy with total EBL 7L. Pt remains intubated.     PLAN:   Neurologic:   -off all sedation, mentating well    Respiratory:   -CPAP  -Cuff leak +  -ABGs stable  -plan for extubation      Cardiovascular: s/p 9u pRBCs, 7u FFP, 4u platelets 4u cryo. Hemodynamically stable during OR, never required pressors  -hx of SVT s/p ablation in 2011, no subsequent episodes, no home medications   -After transfer to SICU - 2 more units PRBC was given (9/11/18) - None overnight       Gastrointestinal/Nutrition:   -NPO      Renal/Genitourinary: ALANIS   -LR@75 cc/hr  -Strict Is&Os  -Cr: 2.61, baseline 0.66. Likely due to large volume blood loss intraop. Will continue to trend  -BMP Q8H  -replete lytes as necessary      Hematologic:   -Continue to trend CBC last three has been-  7/20  ----> 9/25.3----> 8/22.7--->7.8/22--->806/24.2  -pt initially thought to be in DIC, preop plt 72, PT:13.7, PTT:65.4, INR: 1.19  -Coags have since normalized  -Trend H/H q8h      Infectious Disease: No acute issues   -monitor temps    DVT ppx: subq heparin    Tubes/Lines/Drains:   2 R PIVs  R radial A line  K[place]oor    Endocrine: No acute Issues   -monitor glucose via BMP      Disposition: SICU    Critical Care diagnosis  Hemorrhagic shock  ALANIS ASSESSMENT:  37y Female POD2 from primary cesarian section, POD 1 from re-op for hemoperitoneum, now s/p cesarian hysterectomy with total EBL 7L. Pt remains intubated.     PLAN:   Neurologic:   -off all sedation, mentating well    Respiratory:   -successfully extubated, O2 sats 95% on face mask  -lung exam improved from yesterday, clear to auscultation bilaterally  -IS       Cardiovascular: s/p 9u pRBCs, 7u FFP, 4u platelets 4u cryo. Hemodynamically stable during OR, never required pressors  -hx of SVT s/p ablation in 2011, no subsequent episodes, no home medications   -After transfer to SICU - 2 more units PRBC was given (9/11/18) - None overnight       Gastrointestinal/Nutrition:   -NPO, consider sips/clears overnight      Renal/Genitourinary: ALANIS   -LR@75 cc/hr  -Strict Is&Os  -Cr: 2.61, baseline 0.66. Likely due to large volume blood loss intraop. Will continue to trend  -BMP Q8H  -replete lytes as necessary      Hematologic:   -Continue to trend CBC last three has been-  7/20  ----> 9/25.3----> 8/22.7--->7.8/22--->806/24.2  -pt initially thought to be in DIC, preop plt 72, PT:13.7, PTT:65.4, INR: 1.19  -Coags have since normalized  -Trend H/H q8h      Infectious Disease: No acute issues   -monitor temps    DVT ppx: subq heparin    Tubes/Lines/Drains:   2 R PIVs  R radial A line  K[place]oor    Endocrine: No acute Issues   -monitor glucose via BMP      Disposition: SICU    Critical Care diagnosis  Hemorrhagic shock  ALANIS

## 2018-09-12 NOTE — PROGRESS NOTE ADULT - SUBJECTIVE AND OBJECTIVE BOX
SICU AM Progress Note  =====================================================    Overnight Events:    Pt remains intubated and ventilated .  On 50 of propofol , 2 of Fentanyl . Stroke Volume of 13 , CI: 2.8 . Urine output : 40/45/30/100 ml , Net : - 4900 ml . Serial H/H trend :   ----> .3----> .7--->7.8. Right sided BRISEIDA output has between 8 pm and 10 pm has been: 100 / 50 /75/ 15 ml / hr . And 0 ml for the last four hours.      HPI: 37y female PMHx of SVT s/p ablation in  POD1 from primary cesarian section,  +500 PPH. Pt noted to be anuric since 11pm 9/10 despite fluid resuscitation and 2upRBCs, H/H: 6.9/20.5, plt 72, FAST +. Pt taken emergently to the OR. Small R uterine extension and bleeding from the posterior wall were noted. Bleeding was controlled and a BRISEIDA drain was placed. Incision was closed. Continuous oozing was noted from the drain site and decision was made to reopen the incision. Generalized bleeding was noted from the posterior uterine wall and adnexa and the decision was made to perform a hysterectomy and bilateral salpingectomy. Total EBL 7L.  during case  Pt remained intubated. Transferred to the SICU for hemodynamic monitoring.     Surgery Information    Case Duration: 6 hrs     EBL: 5.5L      IV Fluids: 4L LR      Blood Products: 7u pRBCs, 7u FFP, 4u platelets, 4u cryo                HISTORY  37y Female  Allergies: Omnipaque 140 (Anaphylaxis)  penicillin (Anaphylaxis)  Tylenol (Anaphylaxis)  Zithromax Z-Alessandro (Anaphylaxis)    PAST MEDICAL & SURGICAL HISTORY:  Vasovagal syncope  Supraventricular dysrhythmia  Tendinopathy of left biceps tendon          HOME MEDICATIONS:  denies    CURRENT MEDICATIONS:   --------------------------------------------------------------------------------------  Neurologic Medications  HYDROmorphone  Injectable 0.5 milliGRAM(s) IV Push every 3 hours PRN Severe Pain (7 - 10)  ondansetron Injectable 4 milliGRAM(s) IV Push every 6 hours PRN Nausea  oxyCODONE    IR 5 milliGRAM(s) Oral every 4 hours PRN Severe Pain (7 - 10)  oxyCODONE    IR 5 milliGRAM(s) Oral every 3 hours    Respiratory Medications    Cardiovascular Medications    Gastrointestinal Medications  lactated ringers. 1000 milliLiter(s) IV Continuous <Continuous>    Genitourinary Medications  oxytocin Infusion 41.667 milliUNIT(s)/Min IV Continuous <Continuous>  oxytocin Infusion 333.333 milliUNIT(s)/Min IV Continuous <Continuous>  oxytocin Infusion 41.667 milliUNIT(s)/Min IV Continuous <Continuous>    Hematologic/Oncologic Medications  influenza   Vaccine 0.5 milliLiter(s) IntraMuscular once    Antimicrobial/Immunologic Medications    Endocrine/Metabolic Medications    Topical/Other Medications    --------------------------------------------------------------------------------------    VITAL SIGNS, INS/OUTS (last 24 hours):  --------------------------------------------------------------------------------------  T(C): 36.7 (18 @ 00:00), Max: 43 (18 @ 20:00)  HR: 82 (18 @ 03:00) (65 - 116)  BP: 113/52 (18 @ 15:35) (93/58 - 113/52)  BP(mean): 67 (18 @ 15:35) (65 - 90)  ABP: 108/60 (18 @ 03:00) (94/54 - 138/78)  ABP(mean): 77 (18 @ 03:00) (67 - 100)  RR: 19 (18 @ 03:00) (10 - 26)  SpO2: 96% (18 @ 03:00) (94% - 99%)  Wt(kg): --  CVP(mm Hg): --  CI: 2.8 (18 @ 03:00) (2.2 - 4.2)  CAPILLARY BLOOD GLUCOSE       N/A      09-10 @ 07:01  -   @ 07:00  --------------------------------------------------------  IN:    Lactated Ringers IV Bolus: 1500 mL    lactated ringers.: 125 mL    lactated ringers.: 1375 mL    lactated ringers.: 375 mL    Other: 2000 mL    oxytocin Infusion: 875 mL    Packed Red Blood Cells: 592 mL  Total IN: 6842 mL    OUT:    Estimated Blood Loss: 1400 mL    Indwelling Catheter - Urethral: 1220 mL  Total OUT: 2620 mL    Total NET: 4222 mL       @ 07:  -   @ 03:44  --------------------------------------------------------  IN:    fentaNYL  Infusion: 46.5 mL    fentaNYL  Infusion: 148.8 mL    lactated ringers.: 1625 mL    Other: 79178 mL    Packed Red Blood Cells: 600 mL    propofol Infusion: 108.5 mL    propofol Infusion: 189 mL  Total IN: 45213.8 mL    OUT:    Bulb: 855 mL    Estimated Blood Loss: 5500 mL    Indwelling Catheter - Urethral: 800 mL  Total OUT: 7155 mL    Total NET: 6558.8 mL        --------------------------------------------------------------------------------------    EXAM  NEUROLOGY  Sedated    HEENT  Exam: Normocephalic, atraumatic.     RESPIRATORY  Exam: Coarse lung sounds bilaterally.  Mechanical Ventilation: Volume AC 40    CARDIOVASCULAR  Exam: S1, S2.  Regular rate and rhythm.  Moderate peripheral edema     GI/NUTRITION  Exam: Abdomen soft, bandage in place, BRISEIDA drain from R abdomen  Current Diet:  NPO     VASCULAR  Exam: Extremities warm, pink, well-perfused.       SKIN:  Exam: Good skin turgor, no skin breakdown.      METABOLIC/FLUIDS/ELECTROLYTES  lactated ringers. 1000 milliLiter(s) IV Continuous <Continuous>      HEMATOLOGIC  [x] DVT Prophylaxis:   Transfusions:	[x] 9u PRBC	[x] 4u Platelets    [x] 7u FFP	[x] 4u Cryoprecipitate    INFECTIOUS DISEASE  Antimicrobials/Immunologic Medications:  influenza   Vaccine 0.5 milliLiter(s) IntraMuscular once        Tubes/Lines/Drains  ***  [x] Peripheral IV  [] Central Venous Line     	[] R	[] L	[] IJ	[] Fem	[] SC	Date Placed:   [x] Arterial Line		[x] R	[] L	[] Fem	[x] Rad	[] Ax	Date Placed:   [] PICC:         	[] Midline		[] Mediport  [x] Urinary Catheter		Date Placed:     LABS  --------------------------------------------------------------------------------------     CBC ( @ 02:05)                              7.8<L>                       10.97<H>  )--------------(  122<L>     --    % Neuts, --    % Lymphs, ANC: --                                  22.2<L>  CBC ( @ 00:05)                              8.0<L>                       11.02<H>  )--------------(  116<L>     --    % Neuts, --    % Lymphs, ANC: --                                  22.7<L>    BMP ( @ 02:05)             135     |  104     |  21    		Ca++ --      Ca 7.5<L>             ---------------------------------( 97    		Mg 1.9                4.7     |  18<L>   |  2.62<H>			Ph 6.4<H>  BMP ( @ 19:27)             135     |  106     |  19    		Ca++ --      Ca 7.8<L>             ---------------------------------( 113<H>		Mg 1.8                5.4<H>  |  19<L>   |  2.11<H>			Ph 6.2<H>    LFTs ( @ 02:05)      TPro 4.3<L> / Alb 2.1<L> / TBili 0.4 / DBili -- / AST 28 / ALT 9 / AlkPhos 42  LFTs ( @ 15:27)      TPro 4.3<L> / Alb 2.4<L> / TBili 1.7<H> / DBili -- / AST 27 / ALT 13 / AlkPhos 45    Coags ( @ 02:05)  aPTT 26.4<L> / INR 1.01 / PT 11.2  Coags ( @ 00:05)  aPTT 27.0<L> / INR 0.98 / PT 11.3      ABG ( @ 02:05)     7.38 / 36 / 157<H> / 21<L> / -3.7 / 97.1%     Lactate: 0.9    ABG ( @ 00:05)     7.36 / 36 / 153<H> / 21<L> / -4.3 / 99.0%     Lactate: 0.9        Urinalysis (09-10 @ 18:16):     Color: LIGHT YELLOW / Appearance: CLEAR / S.015 / pH: 6.5 / Gluc: NEGATIVE / Ketones: SMALL / Bili: NEGATIVE / Urobili: NORMAL / Protein :10 / Nitrites: NEGATIVE / Leuk.Est: NEGATIVE / RBC: 0-2 / WBC: 3-5 / Sq Epi: OCC / Non Sq Epi:  / Bacteria NEGATIVE         -------------------------------------------------------------------------------------- SICU AM Progress Note  =====================================================    Overnight Events:    Pt remains intubated and ventilated .  On 50 of propofol , 2 of Fentanyl . Stroke Volume of 13 , CI: 2.8 . Urine output : 40/45/30/100 ml , Net : - 4900 ml . Serial H/H trend :   ----> /.3----> .7--->7.8/. Right sided BRISEIDA output has between 8 pm and 10 pm has been: 100 / 50 /75/ 15 ml / hr . And 0 ml for the last four hours.    During daytime, patient alert, cuff leak ETT testing was well, bedside sono without b-lines, patient CPAP well on 40% FiO2.       HPI: 37y female PMHx of SVT s/p ablation in  POD1 from primary cesarian section,  +500 PPH. Pt noted to be anuric since 11pm 9/10 despite fluid resuscitation and 2upRBCs, H/H: 6.9/20.5, plt 72, FAST +. Pt taken emergently to the OR. Small R uterine extension and bleeding from the posterior wall were noted. Bleeding was controlled and a BRISEIDA drain was placed. Incision was closed. Continuous oozing was noted from the drain site and decision was made to reopen the incision. Generalized bleeding was noted from the posterior uterine wall and adnexa and the decision was made to perform a hysterectomy and bilateral salpingectomy. Total EBL 7L.  during case  Pt remained intubated. Transferred to the SICU for hemodynamic monitoring.     Surgery Information    Case Duration: 6 hrs     EBL: 5.5L      IV Fluids: 4L LR      Blood Products: 7u pRBCs, 7u FFP, 4u platelets, 4u cryo                HISTORY  37y Female  Allergies: Omnipaque 140 (Anaphylaxis)  penicillin (Anaphylaxis)  Tylenol (Anaphylaxis)  Zithromax Z-Alessandro (Anaphylaxis)    PAST MEDICAL & SURGICAL HISTORY:  Vasovagal syncope  Supraventricular dysrhythmia  Tendinopathy of left biceps tendon          HOME MEDICATIONS:  denies    CURRENT MEDICATIONS:   --------------------------------------------------------------------------------------  Neurologic Medications  HYDROmorphone  Injectable 0.5 milliGRAM(s) IV Push every 3 hours PRN Severe Pain (7 - 10)  ondansetron Injectable 4 milliGRAM(s) IV Push every 6 hours PRN Nausea  oxyCODONE    IR 5 milliGRAM(s) Oral every 4 hours PRN Severe Pain (7 - 10)  oxyCODONE    IR 5 milliGRAM(s) Oral every 3 hours    Respiratory Medications    Cardiovascular Medications    Gastrointestinal Medications  lactated ringers. 1000 milliLiter(s) IV Continuous <Continuous>    Genitourinary Medications  oxytocin Infusion 41.667 milliUNIT(s)/Min IV Continuous <Continuous>  oxytocin Infusion 333.333 milliUNIT(s)/Min IV Continuous <Continuous>  oxytocin Infusion 41.667 milliUNIT(s)/Min IV Continuous <Continuous>    Hematologic/Oncologic Medications  influenza   Vaccine 0.5 milliLiter(s) IntraMuscular once    Antimicrobial/Immunologic Medications    Endocrine/Metabolic Medications    Topical/Other Medications    --------------------------------------------------------------------------------------    VITAL SIGNS, INS/OUTS (last 24 hours):  --------------------------------------------------------------------------------------  T(C): 36.7 (18 @ 00:00), Max: 43 (18 @ 20:00)  HR: 82 (18 @ 03:00) (65 - 116)  BP: 113/52 (18 @ 15:35) (93/58 - 113/52)  BP(mean): 67 (18 @ 15:35) (65 - 90)  ABP: 108/60 (18 @ 03:00) (94/54 - 138/78)  ABP(mean): 77 (18 @ 03:00) (67 - 100)  RR: 19 (18 @ 03:00) (10 - 26)  SpO2: 96% (18 @ 03:00) (94% - 99%)  Wt(kg): --  CVP(mm Hg): --  CI: 2.8 (18 @ 03:00) (2.2 - 4.2)  CAPILLARY BLOOD GLUCOSE       N/A      09-10 @ 07:  -   @ 07:00  --------------------------------------------------------  IN:    Lactated Ringers IV Bolus: 1500 mL    lactated ringers.: 125 mL    lactated ringers.: 1375 mL    lactated ringers.: 375 mL    Other: 2000 mL    oxytocin Infusion: 875 mL    Packed Red Blood Cells: 592 mL  Total IN: 6842 mL    OUT:    Estimated Blood Loss: 1400 mL    Indwelling Catheter - Urethral: 1220 mL  Total OUT: 2620 mL    Total NET: 4222 mL       @ 07:  -   @ 03:44  --------------------------------------------------------  IN:    fentaNYL  Infusion: 46.5 mL    fentaNYL  Infusion: 148.8 mL    lactated ringers.: 1625 mL    Other: 54942 mL    Packed Red Blood Cells: 600 mL    propofol Infusion: 108.5 mL    propofol Infusion: 189 mL  Total IN: 08903.8 mL    OUT:    Bulb: 855 mL    Estimated Blood Loss: 5500 mL    Indwelling Catheter - Urethral: 800 mL  Total OUT: 7155 mL    Total NET: 6558.8 mL        --------------------------------------------------------------------------------------    EXAM  NEUROLOGY  Sedated    HEENT  Exam: Normocephalic, atraumatic.     RESPIRATORY  Exam: Coarse lung sounds bilaterally.  Mechanical Ventilation: Volume AC 40    CARDIOVASCULAR  Exam: S1, S2.  Regular rate and rhythm.  Moderate peripheral edema     GI/NUTRITION  Exam: Abdomen soft, bandage in place, BRISEIDA drain from R abdomen  Current Diet:  NPO     VASCULAR  Exam: Extremities warm, pink, well-perfused.       SKIN:  Exam: Good skin turgor, no skin breakdown.      METABOLIC/FLUIDS/ELECTROLYTES  lactated ringers. 1000 milliLiter(s) IV Continuous <Continuous>      HEMATOLOGIC  [x] DVT Prophylaxis:   Transfusions:	[x] 9u PRBC	[x] 4u Platelets    [x] 7u FFP	[x] 4u Cryoprecipitate    INFECTIOUS DISEASE  Antimicrobials/Immunologic Medications:  influenza   Vaccine 0.5 milliLiter(s) IntraMuscular once        Tubes/Lines/Drains  ***  [x] Peripheral IV  [] Central Venous Line     	[] R	[] L	[] IJ	[] Fem	[] SC	Date Placed:   [x] Arterial Line		[x] R	[] L	[] Fem	[x] Rad	[] Ax	Date Placed:   [] PICC:         	[] Midline		[] Mediport  [x] Urinary Catheter		Date Placed:     LABS  --------------------------------------------------------------------------------------     CBC ( @ 02:05)                              7.8<L>                       10.97<H>  )--------------(  122<L>     --    % Neuts, --    % Lymphs, ANC: --                                  22.2<L>  CBC ( @ 00:05)                              8.0<L>                       11.02<H>  )--------------(  116<L>     --    % Neuts, --    % Lymphs, ANC: --                                  22.7<L>    BMP ( @ 02:05)             135     |  104     |  21    		Ca++ --      Ca 7.5<L>             ---------------------------------( 97    		Mg 1.9                4.7     |  18<L>   |  2.62<H>			Ph 6.4<H>  BMP ( @ 19:27)             135     |  106     |  19    		Ca++ --      Ca 7.8<L>             ---------------------------------( 113<H>		Mg 1.8                5.4<H>  |  19<L>   |  2.11<H>			Ph 6.2<H>    LFTs ( @ 02:05)      TPro 4.3<L> / Alb 2.1<L> / TBili 0.4 / DBili -- / AST 28 / ALT 9 / AlkPhos 42  LFTs ( @ 15:27)      TPro 4.3<L> / Alb 2.4<L> / TBili 1.7<H> / DBili -- / AST 27 / ALT 13 / AlkPhos 45    Coags ( @ 02:05)  aPTT 26.4<L> / INR 1.01 / PT 11.2  Coags ( @ 00:05)  aPTT 27.0<L> / INR 0.98 / PT 11.3      ABG ( @ 02:05)     7.38 / 36 / 157<H> / 21<L> / -3.7 / 97.1%     Lactate: 0.9    ABG ( @ 00:05)     7.36 / 36 / 153<H> / 21<L> / -4.3 / 99.0%     Lactate: 0.9        Urinalysis (09-10 @ 18:16):     Color: LIGHT YELLOW / Appearance: CLEAR / S.015 / pH: 6.5 / Gluc: NEGATIVE / Ketones: SMALL / Bili: NEGATIVE / Urobili: NORMAL / Protein :10 / Nitrites: NEGATIVE / Leuk.Est: NEGATIVE / RBC: 0-2 / WBC: 3-5 / Sq Epi: OCC / Non Sq Epi:  / Bacteria NEGATIVE         --------------------------------------------------------------------------------------

## 2018-09-12 NOTE — PROGRESS NOTE ADULT - SUBJECTIVE AND OBJECTIVE BOX
ANESTHESIA POSTOP CHECK Day #2    37y Female POSTOP DAY 2 S/P Ex Lap/Hysterectomy    Vital Signs Last 24 Hrs  T(C): 36.1 (12 Sep 2018 08:00), Max: 43 (11 Sep 2018 20:00)  T(F): 97 (12 Sep 2018 08:00), Max: 109.4 (11 Sep 2018 20:00)  HR: 68 (12 Sep 2018 09:00) (65 - 99)  BP: 113/52 (11 Sep 2018 15:35) (113/52 - 113/52)  BP(mean): 67 (11 Sep 2018 15:35) (67 - 67)  RR: 12 (12 Sep 2018 09:00) (10 - 19)  SpO2: 96% (12 Sep 2018 09:00) (94% - 99%)  I&O's Summary    11 Sep 2018 07:01  -  12 Sep 2018 07:00  --------------------------------------------------------  IN: 37614.4 mL / OUT: 7465 mL / NET: 7034.4 mL    12 Sep 2018 07:01  -  12 Sep 2018 09:52  --------------------------------------------------------  IN: 367.8 mL / OUT: 140 mL / NET: 227.8 mL              Comments: Currently intubated on propofol and fentanyl drips. Parent at the bedside.

## 2018-09-12 NOTE — PROGRESS NOTE ADULT - SUBJECTIVE AND OBJECTIVE BOX
R3 Ante Progress Note POD#1     Pt seen and examined at bedside in SICU with R4 and ob attending. She remains intubated and sedated. Overnight she had sanguinous output from the BRISEIDA for 2-3 hours which slowly became serous and then stopped altogether. She was observed with serial CBC and VS monitoring and found to be stable. She is arousable to verbal commands.     Objective:  T(F): 97 (18 @ 08:00), Max: 109.4 (18 @ 20:00)  HR: 66 (18 @ 07:36) (65 - 99)  BP: 113/52 (18 @ 15:35) (97/60 - 113/52)  RR: 13 (18 @ 07:00) (10 - 26)  SpO2: 96% (18 @ 07:36) (94% - 99%)    I&O's Summary    11 Sep 2018 07:  -  12 Sep 2018 07:00  --------------------------------------------------------  IN: 93918.4 mL / OUT: 7465 mL / NET: 7034.4 mL    12 Sep 2018 07:  -  12 Sep 2018 08:12  --------------------------------------------------------  IN: 171.4 mL / OUT: 75 mL / NET: 96.4 mL  Kapoor: 30-100cc per hour overnight  BRISEIDA: 865cc overnight, however scant output since 11pm             MEDICATIONS  (STANDING):  chlorhexidine 0.12% Liquid 15 milliLiter(s) Swish and Spit two times a day  chlorhexidine 4% Liquid 1 Application(s) Topical <User Schedule>  fentaNYL   Infusion 2 MICROgram(s)/kG/Hr (18.54 mL/Hr) IV Continuous <Continuous>  influenza   Vaccine 0.5 milliLiter(s) IntraMuscular once  lactated ringers. 1000 milliLiter(s) (125 mL/Hr) IV Continuous <Continuous>  propofol Infusion 20 MICROgram(s)/kG/Min (11.124 mL/Hr) IV Continuous <Continuous>    MEDICATIONS  (PRN):      Physical Exam:  Constitutional: NAD, sedated but arousable to verbal stimuli. not agitated   CV: RRR  Lungs: clear to auscultation bilaterally  Abdomen: softly distended, no rebound or guarding  Incision: clean, dry, intact pfannenstiel with pressure dressing  Extremities: no lower extremity edema or calf tenderness bilaterally, venodynes in place    Labs:                        7.9    10.87 )-----------( 118      ( 12 Sep 2018 06:00 )             22.7   baso x      eos x      imm gran x      lymph x      mono x      poly x                            7.8    10.97 )-----------( 122      ( 12 Sep 2018 02:05 )             22.2   baso x      eos x      imm gran x      lymph x      mono x      poly x                            8.0    11.02 )-----------( 116      ( 12 Sep 2018 00:05 )             22.7   baso x      eos x      imm gran x      lymph x      mono x      poly x                            9.0    11.07 )-----------( 124      ( 11 Sep 2018 19:27 )             25.3   baso x      eos x      imm gran x      lymph x      mono x      poly x                            7.0    11.73 )-----------( 116      ( 11 Sep 2018 15:27 )             20.0   baso 0.1    eos 0.0    imm gran 0.3    lymph 6.0    mono 5.8    poly 87.8                         7.0    12.67 )-----------( 129      ( 11 Sep 2018 13:00 )             20.0   baso 0.1    eos 0.0    imm gran 0.4    lymph 3.2    mono 6.1    poly 90.2                         8.8    18.46 )-----------( 79       ( 11 Sep 2018 11:28 )             25.3   baso x      eos x      imm gran x      lymph x      mono x      poly x                            8.0    17.10 )-----------( 69       ( 11 Sep 2018 09:43 )             22.9   baso x      eos x      imm gran x      lymph x      mono x      poly x                            8.4    18.50 )-----------( 75       ( 11 Sep 2018 09:00 )             24.5   baso 0.2    eos 0.0    imm gran 0.5    lymph 7.4    mono 5.7    poly 86.2                         6.9    18.46 )-----------( 72       ( 11 Sep 2018 04:10 )             20.5   baso 0.1    eos 0.0    imm gran 0.4    lymph 5.4    mono 3.5    poly 90.6                         8.1    22.70 )-----------( 101      ( 11 Sep 2018 00:45 )             24.6   baso x      eos x      imm gran x      lymph x      mono x      poly x                            10.8   11.82 )-----------( 191      ( 10 Sep 2018 18:16 )             32.8   baso 0.5    eos 0.2    imm gran 0.4    lymph 16.4   mono 6.3    poly 76.2     09    135    |  104    |  21     ----------------------------<  97     4.7     |  18<L>  |  2.62<H>      135    |  106    |  19     ----------------------------<  113<H>  5.4<H>   |  19<L>  |  2.11<H>      137    |  106    |  17     ----------------------------<  119<H>  4.9     |  20<L>  |  1.92<H>      137    |  104    |  15     ----------------------------<  146<H>  6.4<HH>   |  17<L>  |  1.63<H>      133<L>  |  104    |  17     ----------------------------<  88     5.4<H>   |  20<L>  |  1.75<H>      134<L>  |  103    |  14     ----------------------------<  89     4.7     |  18<L>  |  1.31<H>      136    |  102    |  12     ----------------------------<  93     4.2     |  17<L>  |  1.19     Ca    7.5<L>      12 Sep 2018 02:05  Ca    7.8<L>      11 Sep 2018 19:27  Ca    8.3<L>      11 Sep 2018 15:27  Ca    8.1<L>      11 Sep 2018 13:00  Ca    7.6<L>      11 Sep 2018 09:00  Ca    7.8<L>      11 Sep 2018 04:10  Ca    7.9<L>      11 Sep 2018 01:20  Phos  6.4         Phos  6.2         Phos  5.7         Mg     1.9         Mg     1.8         Mg     1.4           TPro  4.3<L>  /  Alb  2.1<L>  /  TBili  0.4    /  DBili  x      /  AST  28     /  ALT  9      /  AlkPhos  42     12  TPro  4.3<L>  /  Alb  2.4<L>  /  TBili  1.7<H>  /  DBili  x      /  AST  27     /  ALT  13     /  AlkPhos  45       TPro  4.7<L>  /  Alb  2.6<L>  /  TBili  1.8<H>  /  DBili  x      /  AST  24     /  ALT  13     /  AlkPhos  46     09-11  TPro  4.7<L>  /  Alb  2.4<L>  /  TBili  1.2    /  DBili  x      /  AST  43<H>  /  ALT  10     /  AlkPhos  74     11  TPro  4.6<L>  /  Alb  2.2<L>  /  TBili  2.3<H>  /  DBili  x      /  AST  42<H>  /  ALT  10     /  AlkPhos  77       TPro  4.7<L>  /  Alb  2.4<L>  /  TBili  2.6<H>  /  DBili  x      /  AST  44<H>  /  ALT  10     /  AlkPhos  89             PT/INR - ( 12 Sep 2018 06:00 )   PT: 11.0 SEC;   INR: 0.99          PTT - ( 12 Sep 2018 06:00 )  PTT:25.8 SEC  Urinalysis Basic - ( 10 Sep 2018 18:16 )    Color: LIGHT YELLOW / Appearance: CLEAR / S.015 / pH: 6.5  Gluc: NEGATIVE / Ketone: SMALL  / Bili: NEGATIVE / Urobili: NORMAL   Blood: MODERATE / Protein: 10 / Nitrite: NEGATIVE   Leuk Esterase: NEGATIVE / RBC: 0-2 / WBC 3-5   Sq Epi: OCC / Non Sq Epi: x / Bacteria: NEGATIVE

## 2018-09-12 NOTE — PROGRESS NOTE ADULT - PROBLEM SELECTOR PLAN 1
Neuro: Per SICU, plan to wean sedation and extubate.   CV: Hemodynamically stable at this time, being monitored via a-line and flow track.   Pulm: As above, extubated per SICU.   GI: NPO  : ALANIS with most recent Cr 2.6. UOP currently improving however pt was anuric for >12 hrs due to hemorrhage. Continue to monitor UOP and trend Cr/electrolytes. Anticipate recovery of renal function over the course of the day.   Heme: Hct stable after hemorrhage with MTP. q4 labs currently ordered. SCD for DVT PPX.   Dispo per SICU  Loyda Flores PGY3

## 2018-09-12 NOTE — PROGRESS NOTE ADULT - ASSESSMENT
38 yo  POD#2 s/p pLTCS for non-reassuring fetal heart tracing and POD#1 s/p return to OR for intraabdominal hemorrhage and hysterectomy with a total EBL of 7L.  Patient remains intubated but is hemodynamically stable, not requiring pressor support.  BRISEIDA output was increased earlier in the evening but has since decreased and changed to serosanguinous in nature.

## 2018-09-12 NOTE — PROGRESS NOTE ADULT - ASSESSMENT
Assessment/Plan: 37y  who underwent emergent  on 9/10 with EBL of 900. She had 500cc PPH from the vagina overnight in the PACU and went on to develop anuria for several hours despite fluid resuscitation and blood transfusion. On  AM she was noted to have positive FAST scan and underwent RTOR for intraabdominal hemorrhage. 1.5L evacuated and posterior uterus and hysterotomy oversewn and hemostatic agents placed over these sites. Hemostasis was unable to be obtained, likely 2/2 DIC. The decision was then made to proceed with hysterectomy due to uncontrolled bleeding. Total EBL was 7L. Total products given: 2PRBCpreop+7PRBC intraop+2PRBCpostop + 7FFP + 4Plt +4 cryo. She is currently hemodynamically stable in SICU without signs of further bleeding. DIC labs show resolution of coagulopathy. UOP has been excellent for last 12 hours. Overall status is improving.

## 2018-09-13 LAB
ALBUMIN SERPL ELPH-MCNC: 2.2 G/DL — LOW (ref 3.3–5)
ALBUMIN SERPL ELPH-MCNC: 2.3 G/DL — LOW (ref 3.3–5)
ALP SERPL-CCNC: 51 U/L — SIGNIFICANT CHANGE UP (ref 40–120)
ALP SERPL-CCNC: 62 U/L — SIGNIFICANT CHANGE UP (ref 40–120)
ALT FLD-CCNC: 5 U/L — SIGNIFICANT CHANGE UP (ref 4–33)
ALT FLD-CCNC: 9 U/L — SIGNIFICANT CHANGE UP (ref 4–33)
APTT BLD: 26.5 SEC — LOW (ref 27.5–37.4)
AST SERPL-CCNC: 31 U/L — SIGNIFICANT CHANGE UP (ref 4–32)
AST SERPL-CCNC: 37 U/L — HIGH (ref 4–32)
BASE EXCESS BLDA CALC-SCNC: -4.5 MMOL/L — SIGNIFICANT CHANGE UP
BASOPHILS # BLD AUTO: 0.05 K/UL — SIGNIFICANT CHANGE UP (ref 0–0.2)
BASOPHILS NFR BLD AUTO: 0.3 % — SIGNIFICANT CHANGE UP (ref 0–2)
BILIRUB SERPL-MCNC: 0.5 MG/DL — SIGNIFICANT CHANGE UP (ref 0.2–1.2)
BILIRUB SERPL-MCNC: 0.6 MG/DL — SIGNIFICANT CHANGE UP (ref 0.2–1.2)
BUN SERPL-MCNC: 27 MG/DL — HIGH (ref 7–23)
BUN SERPL-MCNC: 34 MG/DL — HIGH (ref 7–23)
CA-I BLDA-SCNC: 1.02 MMOL/L — LOW (ref 1.15–1.29)
CALCIUM SERPL-MCNC: 7.5 MG/DL — LOW (ref 8.4–10.5)
CALCIUM SERPL-MCNC: 7.6 MG/DL — LOW (ref 8.4–10.5)
CHLORIDE SERPL-SCNC: 104 MMOL/L — SIGNIFICANT CHANGE UP (ref 98–107)
CHLORIDE SERPL-SCNC: 106 MMOL/L — SIGNIFICANT CHANGE UP (ref 98–107)
CO2 SERPL-SCNC: 18 MMOL/L — LOW (ref 22–31)
CO2 SERPL-SCNC: 19 MMOL/L — LOW (ref 22–31)
CREAT SERPL-MCNC: 3.65 MG/DL — HIGH (ref 0.5–1.3)
CREAT SERPL-MCNC: 3.69 MG/DL — HIGH (ref 0.5–1.3)
EOSINOPHIL # BLD AUTO: 0.03 K/UL — SIGNIFICANT CHANGE UP (ref 0–0.5)
EOSINOPHIL NFR BLD AUTO: 0.2 % — SIGNIFICANT CHANGE UP (ref 0–6)
FIBRINOGEN PPP-MCNC: 776.5 MG/DL — HIGH (ref 310–510)
GLUCOSE BLDA-MCNC: 80 MG/DL — SIGNIFICANT CHANGE UP (ref 70–99)
GLUCOSE SERPL-MCNC: 81 MG/DL — SIGNIFICANT CHANGE UP (ref 70–99)
GLUCOSE SERPL-MCNC: 81 MG/DL — SIGNIFICANT CHANGE UP (ref 70–99)
HCO3 BLDA-SCNC: 21 MMOL/L — LOW (ref 22–26)
HCT VFR BLD CALC: 23.3 % — LOW (ref 34.5–45)
HCT VFR BLD CALC: 25 % — LOW (ref 34.5–45)
HCT VFR BLDA CALC: 26.1 % — LOW (ref 34.5–46.5)
HGB BLD-MCNC: 7.9 G/DL — LOW (ref 11.5–15.5)
HGB BLD-MCNC: 8.4 G/DL — LOW (ref 11.5–15.5)
HGB BLDA-MCNC: 8.4 G/DL — LOW (ref 11.5–15.5)
IMM GRANULOCYTES # BLD AUTO: 0.08 # — SIGNIFICANT CHANGE UP
IMM GRANULOCYTES NFR BLD AUTO: 0.5 % — SIGNIFICANT CHANGE UP (ref 0–1.5)
LACTATE BLDA-SCNC: 0.7 MMOL/L — SIGNIFICANT CHANGE UP (ref 0.5–2)
LYMPHOCYTES # BLD AUTO: 1.35 K/UL — SIGNIFICANT CHANGE UP (ref 1–3.3)
LYMPHOCYTES # BLD AUTO: 8.6 % — LOW (ref 13–44)
MAGNESIUM SERPL-MCNC: 2.2 MG/DL — SIGNIFICANT CHANGE UP (ref 1.6–2.6)
MCHC RBC-ENTMCNC: 29.4 PG — SIGNIFICANT CHANGE UP (ref 27–34)
MCHC RBC-ENTMCNC: 30.1 PG — SIGNIFICANT CHANGE UP (ref 27–34)
MCHC RBC-ENTMCNC: 33.6 % — SIGNIFICANT CHANGE UP (ref 32–36)
MCHC RBC-ENTMCNC: 33.9 % — SIGNIFICANT CHANGE UP (ref 32–36)
MCV RBC AUTO: 86.6 FL — SIGNIFICANT CHANGE UP (ref 80–100)
MCV RBC AUTO: 89.6 FL — SIGNIFICANT CHANGE UP (ref 80–100)
MONOCYTES # BLD AUTO: 0.86 K/UL — SIGNIFICANT CHANGE UP (ref 0–0.9)
MONOCYTES NFR BLD AUTO: 5.5 % — SIGNIFICANT CHANGE UP (ref 2–14)
NEUTROPHILS # BLD AUTO: 13.31 K/UL — HIGH (ref 1.8–7.4)
NEUTROPHILS NFR BLD AUTO: 84.9 % — HIGH (ref 43–77)
NRBC # FLD: 0 — SIGNIFICANT CHANGE UP
NRBC # FLD: 0 — SIGNIFICANT CHANGE UP
PCO2 BLDA: 27 MMHG — LOW (ref 32–48)
PH BLDA: 7.46 PH — HIGH (ref 7.35–7.45)
PHOSPHATE SERPL-MCNC: 5.8 MG/DL — HIGH (ref 2.5–4.5)
PLATELET # BLD AUTO: 130 K/UL — LOW (ref 150–400)
PLATELET # BLD AUTO: 173 K/UL — SIGNIFICANT CHANGE UP (ref 150–400)
PMV BLD: 11 FL — SIGNIFICANT CHANGE UP (ref 7–13)
PMV BLD: 11.6 FL — SIGNIFICANT CHANGE UP (ref 7–13)
PO2 BLDA: 125 MMHG — HIGH (ref 83–108)
POTASSIUM BLDA-SCNC: 4 MMOL/L — SIGNIFICANT CHANGE UP (ref 3.4–4.5)
POTASSIUM SERPL-MCNC: 4.3 MMOL/L — SIGNIFICANT CHANGE UP (ref 3.5–5.3)
POTASSIUM SERPL-MCNC: 4.5 MMOL/L — SIGNIFICANT CHANGE UP (ref 3.5–5.3)
POTASSIUM SERPL-SCNC: 4.3 MMOL/L — SIGNIFICANT CHANGE UP (ref 3.5–5.3)
POTASSIUM SERPL-SCNC: 4.5 MMOL/L — SIGNIFICANT CHANGE UP (ref 3.5–5.3)
PROT SERPL-MCNC: 4.5 G/DL — LOW (ref 6–8.3)
PROT SERPL-MCNC: 5.2 G/DL — LOW (ref 6–8.3)
RBC # BLD: 2.69 M/UL — LOW (ref 3.8–5.2)
RBC # BLD: 2.79 M/UL — LOW (ref 3.8–5.2)
RBC # FLD: 15.5 % — HIGH (ref 10.3–14.5)
RBC # FLD: 15.6 % — HIGH (ref 10.3–14.5)
SAO2 % BLDA: 98.3 % — SIGNIFICANT CHANGE UP (ref 95–99)
SODIUM BLDA-SCNC: 136 MMOL/L — SIGNIFICANT CHANGE UP (ref 136–146)
SODIUM SERPL-SCNC: 138 MMOL/L — SIGNIFICANT CHANGE UP (ref 135–145)
SODIUM SERPL-SCNC: 140 MMOL/L — SIGNIFICANT CHANGE UP (ref 135–145)
WBC # BLD: 14 K/UL — HIGH (ref 3.8–10.5)
WBC # BLD: 15.68 K/UL — HIGH (ref 3.8–10.5)
WBC # FLD AUTO: 14 K/UL — HIGH (ref 3.8–10.5)
WBC # FLD AUTO: 15.68 K/UL — HIGH (ref 3.8–10.5)

## 2018-09-13 PROCEDURE — 71045 X-RAY EXAM CHEST 1 VIEW: CPT | Mod: 26

## 2018-09-13 PROCEDURE — 99233 SBSQ HOSP IP/OBS HIGH 50: CPT

## 2018-09-13 RX ORDER — MIDAZOLAM HYDROCHLORIDE 1 MG/ML
2 INJECTION, SOLUTION INTRAMUSCULAR; INTRAVENOUS ONCE
Qty: 0 | Refills: 0 | Status: DISCONTINUED | OUTPATIENT
Start: 2018-09-13 | End: 2018-09-13

## 2018-09-13 RX ORDER — HEPARIN SODIUM 5000 [USP'U]/ML
5000 INJECTION INTRAVENOUS; SUBCUTANEOUS EVERY 12 HOURS
Qty: 0 | Refills: 0 | Status: DISCONTINUED | OUTPATIENT
Start: 2018-09-13 | End: 2018-09-13

## 2018-09-13 RX ORDER — ONDANSETRON 8 MG/1
4 TABLET, FILM COATED ORAL EVERY 6 HOURS
Qty: 0 | Refills: 0 | Status: DISCONTINUED | OUTPATIENT
Start: 2018-09-13 | End: 2018-09-18

## 2018-09-13 RX ORDER — ONDANSETRON 8 MG/1
4 TABLET, FILM COATED ORAL ONCE
Qty: 0 | Refills: 0 | Status: COMPLETED | OUTPATIENT
Start: 2018-09-13 | End: 2018-09-14

## 2018-09-13 RX ORDER — SIMETHICONE 80 MG/1
80 TABLET, CHEWABLE ORAL
Qty: 0 | Refills: 0 | Status: DISCONTINUED | OUTPATIENT
Start: 2018-09-13 | End: 2018-09-18

## 2018-09-13 RX ORDER — OXYCODONE HYDROCHLORIDE 5 MG/1
1 TABLET ORAL
Qty: 8 | Refills: 0 | OUTPATIENT
Start: 2018-09-13 | End: 2018-09-14

## 2018-09-13 RX ORDER — ONDANSETRON 8 MG/1
4 TABLET, FILM COATED ORAL ONCE
Qty: 0 | Refills: 0 | Status: COMPLETED | OUTPATIENT
Start: 2018-09-13 | End: 2018-09-13

## 2018-09-13 RX ORDER — OXYCODONE HYDROCHLORIDE 5 MG/1
5 TABLET ORAL EVERY 6 HOURS
Qty: 0 | Refills: 0 | Status: DISCONTINUED | OUTPATIENT
Start: 2018-09-13 | End: 2018-09-13

## 2018-09-13 RX ADMIN — HEPARIN SODIUM 5000 UNIT(S): 5000 INJECTION INTRAVENOUS; SUBCUTANEOUS at 05:11

## 2018-09-13 RX ADMIN — HYDROMORPHONE HYDROCHLORIDE 0.5 MILLIGRAM(S): 2 INJECTION INTRAMUSCULAR; INTRAVENOUS; SUBCUTANEOUS at 00:30

## 2018-09-13 RX ADMIN — HYDROMORPHONE HYDROCHLORIDE 0.5 MILLIGRAM(S): 2 INJECTION INTRAMUSCULAR; INTRAVENOUS; SUBCUTANEOUS at 00:15

## 2018-09-13 RX ADMIN — CHLORHEXIDINE GLUCONATE 1 APPLICATION(S): 213 SOLUTION TOPICAL at 10:49

## 2018-09-13 RX ADMIN — HYDROMORPHONE HYDROCHLORIDE 0.5 MILLIGRAM(S): 2 INJECTION INTRAMUSCULAR; INTRAVENOUS; SUBCUTANEOUS at 10:15

## 2018-09-13 RX ADMIN — HEPARIN SODIUM 5000 UNIT(S): 5000 INJECTION INTRAVENOUS; SUBCUTANEOUS at 22:31

## 2018-09-13 RX ADMIN — HYDROMORPHONE HYDROCHLORIDE 0.5 MILLIGRAM(S): 2 INJECTION INTRAMUSCULAR; INTRAVENOUS; SUBCUTANEOUS at 09:59

## 2018-09-13 RX ADMIN — SIMETHICONE 80 MILLIGRAM(S): 80 TABLET, CHEWABLE ORAL at 13:47

## 2018-09-13 RX ADMIN — SIMETHICONE 80 MILLIGRAM(S): 80 TABLET, CHEWABLE ORAL at 22:32

## 2018-09-13 RX ADMIN — OXYCODONE HYDROCHLORIDE 5 MILLIGRAM(S): 5 TABLET ORAL at 19:47

## 2018-09-13 RX ADMIN — SODIUM CHLORIDE 75 MILLILITER(S): 9 INJECTION, SOLUTION INTRAVENOUS at 08:30

## 2018-09-13 RX ADMIN — OXYCODONE HYDROCHLORIDE 5 MILLIGRAM(S): 5 TABLET ORAL at 14:11

## 2018-09-13 RX ADMIN — OXYCODONE HYDROCHLORIDE 5 MILLIGRAM(S): 5 TABLET ORAL at 23:53

## 2018-09-13 RX ADMIN — OXYCODONE HYDROCHLORIDE 5 MILLIGRAM(S): 5 TABLET ORAL at 19:17

## 2018-09-13 RX ADMIN — ONDANSETRON 4 MILLIGRAM(S): 8 TABLET, FILM COATED ORAL at 20:10

## 2018-09-13 RX ADMIN — HYDROMORPHONE HYDROCHLORIDE 0.5 MILLIGRAM(S): 2 INJECTION INTRAMUSCULAR; INTRAVENOUS; SUBCUTANEOUS at 05:00

## 2018-09-13 RX ADMIN — HEPARIN SODIUM 5000 UNIT(S): 5000 INJECTION INTRAVENOUS; SUBCUTANEOUS at 14:06

## 2018-09-13 RX ADMIN — OXYCODONE HYDROCHLORIDE 5 MILLIGRAM(S): 5 TABLET ORAL at 22:33

## 2018-09-13 RX ADMIN — Medication 100 MILLIGRAM(S): at 22:32

## 2018-09-13 RX ADMIN — ONDANSETRON 4 MILLIGRAM(S): 8 TABLET, FILM COATED ORAL at 14:00

## 2018-09-13 NOTE — PROGRESS NOTE ADULT - SUBJECTIVE AND OBJECTIVE BOX
R3 Postpartum Progress Note POD#2      Subjective:     Pt seen and examined at bedside in SICU with Dr. Mcfarland. No events overnight, extubated successfully yesterday afternoon. Pain moderately controlled on Dilaudid, however pt complaining of gas pain. She has not passed any gas. Not yet oob. Kapoor in place. Pt reports anxiety over everything that has happened to her.      Objective:  T(F): 99.4 (09-13-18 @ 08:00), Max: 100.5 (09-12-18 @ 20:00)  HR: 85 (09-13-18 @ 08:00) (68 - 125)  ABP: 157/82 (13 Sep 2018 08:00) (100/57 - 157/82)  RR: 23 (09-13-18 @ 08:00) (11 - 24)  SpO2: 98% (09-13-18 @ 08:00) (92% - 100%)  RR: 23 (13 Sep 2018 08:00) (11 - 24)  SpO2: 98% (13 Sep 2018 08:00) (92% - 100%)      I&O's Summary    12 Sep 2018 07:01  -  13 Sep 2018 07:00  --------------------------------------------------------  IN: 1956.6 mL / OUT: 3320 mL / NET: -1363.4 mL    13 Sep 2018 07:01  -  13 Sep 2018 08:29  --------------------------------------------------------  IN: 75 mL / OUT: 175 mL / NET: -100 mL            MEDICATIONS  (STANDING):  chlorhexidine 4% Liquid 1 Application(s) Topical <User Schedule>  heparin  Injectable 5000 Unit(s) SubCutaneous every 8 hours  influenza   Vaccine 0.5 milliLiter(s) IntraMuscular once  lactated ringers. 1000 milliLiter(s) (75 mL/Hr) IV Continuous <Continuous>  midazolam Injectable 2 milliGRAM(s) IV Push once  oxyCODONE    IR 5 milliGRAM(s) Oral every 6 hours    MEDICATIONS  (PRN):  HYDROmorphone  Injectable 0.5 milliGRAM(s) IV Push every 4 hours PRN Severe Pain (7 - 10)  simethicone 80 milliGRAM(s) Chew two times a day PRN Gas      Physical Exam:  Constitutional: NAD, A+O x3, pt anxious  Abdomen: softly distended, appropriately tender, no rebound or guarding  Incision: clean, dry, intact, silk tape pressure dressly slowly removed by Dr. Mcfarland over approx 15 minutes   BRISEIDA: serosanguinous, drain removed   Extremities: no lower extremity edema or calf tenderness bilaterally    Labs:                        7.9    14.00 )-----------( 130      ( 13 Sep 2018 03:50 )             23.3                         8.2    14.76 )-----------( 135      ( 12 Sep 2018 21:55 )             23.9                         8.3    14.93 )-----------( 132      ( 12 Sep 2018 18:15 )             23.9                           8.6    14.02 )-----------( 127      ( 12 Sep 2018 12:00 )             24.2                7.9    10.87 )-----------( 118      ( 12 Sep 2018 06:00 )             22.7                           7.8    10.97 )-----------( 122      ( 12 Sep 2018 02:05 )             22.2                8.0    11.02 )-----------( 116      ( 12 Sep 2018 00:05 )             22.7                           9.0    11.07 )-----------( 124      ( 11 Sep 2018 19:27 )             25.3                             7.0    11.73 )-----------( 116      ( 11 Sep 2018 15:27 )             20.0               09-13    138    |  106    |  27<H>  ----------------------------<  81     4.3     |  18<L>  |  3.69<H>  09-12    138    |  106    |  25<H>  ----------------------------<  87     4.5     |  18<L>  |  3.39<H>  09-12    135    |  104    |  21     ----------------------------<  97     4.7     |  18<L>  |  2.62<H>    Ca    7.6<L>      13 Sep 2018 03:50  Ca    7.8<L>      12 Sep 2018 18:15  Ca    7.5<L>      12 Sep 2018 02:05  Phos  5.8       09-13  Phos  6.4       09-12  Mg     2.2       09-13  Mg     1.9       09-12    TPro  4.5<L>  /  Alb  2.2<L>  /  TBili  0.5    /  DBili  x      /  AST  31     /  ALT  5      /  AlkPhos  51     09-13  TPro  4.3<L>  /  Alb  2.1<L>  /  TBili  0.4    /  DBili  x      /  AST  28     /  ALT  9      /  AlkPhos  42     09-12        PT/INR - ( 12 Sep 2018 06:00 )   PT: 11.0 SEC;   INR: 0.99          PTT - ( 13 Sep 2018 03:50 )  PTT:26.5 SEC

## 2018-09-13 NOTE — CHART NOTE - NSCHARTNOTEFT_GEN_A_CORE
R4 GYN ONC AM Note:    S: Patient requesting that we not round on her.     O:  ICU Vital Signs Last 24 Hrs  T(C): 38.1 (12 Sep 2018 20:00), Max: 38.1 (12 Sep 2018 20:00)  T(F): 100.5 (12 Sep 2018 20:00), Max: 100.5 (12 Sep 2018 20:00)  HR: 77 (13 Sep 2018 06:00) (66 - 125)  ABP: 153/77 (13 Sep 2018 06:00) (100/57 - 157/77)  ABP(mean): 103 (13 Sep 2018 06:00) (71 - 105)  RR: 23 (13 Sep 2018 06:00) (11 - 24)  SpO2: 97% (13 Sep 2018 06:00) (95% - 100%)      11 Sep 2018 07:01  -  12 Sep 2018 07:00  --------------------------------------------------------  IN:    fentaNYL  Infusion: 223.2 mL    fentaNYL  Infusion: 46.5 mL    IV PiggyBack: 100 mL    lactated ringers.: 2125 mL    Other: 80373 mL    Packed Red Blood Cells: 600 mL    propofol Infusion: 108.5 mL    propofol Infusion: 300.2 mL  Total IN: 90639.4 mL    OUT:    Bulb: 865 mL    Estimated Blood Loss: 5500 mL    Indwelling Catheter - Urethral: 1100 mL  Total OUT: 7465 mL    Total NET: 7034.4 mL      12 Sep 2018 07:01  -  13 Sep 2018 06:48  --------------------------------------------------------  IN:    fentaNYL  Infusion: 51 mL    lactated ringers.: 1775 mL    propofol Infusion: 55.6 mL  Total IN: 1881.6 mL    OUT:    Bulb: 50 mL    Indwelling Catheter - Urethral: 3060 mL  Total OUT: 3110 mL    Total NET: -1228.4 mL    Physical Exam:  Gen: NAD                          7.9    14.00 )-----------( 130      ( 13 Sep 2018 03:50 )             23.3     09-13 @ 03:50    138  |  106  |  27  ----------------------------<  81  4.3   |  18  |  3.69    Ca    7.6      09-13 @ 03:50  Phos  5.8     09-13 @ 03:50  Mg     2.2     09-13 @ 03:50    TPro  4.5  /  Alb  2.2  /  TBili  0.5  /  DBili  x   /  AST  31  /  ALT  5   /  AlkPhos  51  09-13 @ 03:50    MEDICATIONS  (STANDING):  chlorhexidine 4% Liquid 1 Application(s) Topical <User Schedule>  heparin  Injectable 5000 Unit(s) SubCutaneous every 8 hours  influenza   Vaccine 0.5 milliLiter(s) IntraMuscular once  lactated ringers. 1000 milliLiter(s) (75 mL/Hr) IV Continuous <Continuous>    MEDICATIONS  (PRN):  HYDROmorphone  Injectable 0.5 milliGRAM(s) IV Push every 4 hours PRN Severe Pain (7 - 10)    A/P: Deferred to patient request not to round this AM. Appreciate SICU care. Please Call the office for an appointment GYN ONC w/ any questions or concerns [phone].    Josué VALLE R4 GYN ONC AM Note:    S: Patient requesting no further evaluation as it is giving her more anxiety    O:  ICU Vital Signs Last 24 Hrs  T(C): 38.1 (12 Sep 2018 20:00), Max: 38.1 (12 Sep 2018 20:00)  T(F): 100.5 (12 Sep 2018 20:00), Max: 100.5 (12 Sep 2018 20:00)  HR: 77 (13 Sep 2018 06:00) (66 - 125)  ABP: 153/77 (13 Sep 2018 06:00) (100/57 - 157/77)  ABP(mean): 103 (13 Sep 2018 06:00) (71 - 105)  RR: 23 (13 Sep 2018 06:00) (11 - 24)  SpO2: 97% (13 Sep 2018 06:00) (95% - 100%)      11 Sep 2018 07:01  -  12 Sep 2018 07:00  --------------------------------------------------------  IN:    fentaNYL  Infusion: 223.2 mL    fentaNYL  Infusion: 46.5 mL    IV PiggyBack: 100 mL    lactated ringers.: 2125 mL    Other: 05441 mL    Packed Red Blood Cells: 600 mL    propofol Infusion: 108.5 mL    propofol Infusion: 300.2 mL  Total IN: 18050.4 mL    OUT:    Bulb: 865 mL    Estimated Blood Loss: 5500 mL    Indwelling Catheter - Urethral: 1100 mL  Total OUT: 7465 mL    Total NET: 7034.4 mL      12 Sep 2018 07:01  -  13 Sep 2018 06:48  --------------------------------------------------------  IN:    fentaNYL  Infusion: 51 mL    lactated ringers.: 1775 mL    propofol Infusion: 55.6 mL  Total IN: 1881.6 mL    OUT:    Bulb: 50 mL    Indwelling Catheter - Urethral: 3060 mL  Total OUT: 3110 mL    Total NET: -1228.4 mL    Physical Exam:  Gen: NAD                          7.9    14.00 )-----------( 130      ( 13 Sep 2018 03:50 )             23.3     09-13 @ 03:50    138  |  106  |  27  ----------------------------<  81  4.3   |  18  |  3.69    Ca    7.6      09-13 @ 03:50  Phos  5.8     09-13 @ 03:50  Mg     2.2     09-13 @ 03:50    TPro  4.5  /  Alb  2.2  /  TBili  0.5  /  DBili  x   /  AST  31  /  ALT  5   /  AlkPhos  51  09-13 @ 03:50    MEDICATIONS  (STANDING):  chlorhexidine 4% Liquid 1 Application(s) Topical <User Schedule>  heparin  Injectable 5000 Unit(s) SubCutaneous every 8 hours  influenza   Vaccine 0.5 milliLiter(s) IntraMuscular once  lactated ringers. 1000 milliLiter(s) (75 mL/Hr) IV Continuous <Continuous>    MEDICATIONS  (PRN):  HYDROmorphone  Injectable 0.5 milliGRAM(s) IV Push every 4 hours PRN Severe Pain (7 - 10)    A/P: Deferred to patient request not to round this AM. Appreciate SICU care. Please Call the office for an appointment GYN ONC w/ any questions or concerns [phone].    Josué VALLE      Gyn Oncology Attending  Patient seen and examined - discussed intraop findings and need for hysterectomy.   Patient demonstrated understanding, she is feeling well, oob to chair,   anemia - asxs but will re-eval need for transfusion if patient becomes sxs after ambulating  all questions answered    Elsa Walters MD

## 2018-09-13 NOTE — PROGRESS NOTE ADULT - PROBLEM SELECTOR PLAN 1
Neuro: dilaudid for pain control  Card: hemodynamically stable, cont to monitor BPs  Resp: extubated oxygenating well  GI: sips/chips, pt with significant gas pain. will mobilize pt oob and once passing gas will advance diet per SICU  : maintain benz to monitor UOP in setting of ALANIS. trend Cr.  heme: anemia 2/2 blood loss now stable. start SQH for DVT ppx. scd/oob/amb for dvt ppx  dispo: appreciate SICU care  Loyda Flores PGY3

## 2018-09-13 NOTE — PROGRESS NOTE ADULT - SUBJECTIVE AND OBJECTIVE BOX
R3 OB Postpartum Note    Patient seen and examined at bedside around 1:30am.  No acute events overnight.  Patient is awake and without complaint.  Pain is well-controlled.  Patient has not been out of bed.  She is tolerating ice chips.  She denies CP, SOB, and palpitations.  She has a benz catheter in place.  Postpartum bleeding is well controlled.      Physical exam:    Vital Signs Last 24 Hrs  T(C): 38.1 (12 Sep 2018 20:00), Max: 38.1 (12 Sep 2018 20:00)  T(F): 100.5 (12 Sep 2018 20:00), Max: 100.5 (12 Sep 2018 20:00)  HR: 73 (13 Sep 2018 02:00) (66 - 125)  BP: --  BP(mean): --  RR: 18 (13 Sep 2018 02:00) (11 - 24)  SpO2: 98% (13 Sep 2018 02:00) (95% - 100%)    09-11 @ 07:01 - 09-12 @ 07:00  --------------------------------------------------------  IN: 76523.4 mL / OUT: 7465 mL / NET: 7034.4 mL    09-12 @ 07:01 - 09-13 @ 02:47  --------------------------------------------------------  IN: 1656.6 mL / OUT: 2640 mL / NET: -983.4 mL        Gen: NAD, Sitting comfortable in bed  CV RRR S1S2  Pulm CTAB, nasal cannula  Abdomen: Soft, appropriate post-op tenderness, non- distended , firm uterine fundus at umbilicus.  Incision: Dressing with serosanguinous drainage, BRISEIDA drain with serosanguinous drainage  Pelvic: Normal lochia noted  Ext: No calf tenderness    LABS:                        8.2    14.76 )-----------( 135      ( 12 Sep 2018 21:55 )             23.9                         8.3    14.93 )-----------( 132      ( 12 Sep 2018 18:15 )             23.9                         8.6    14.02 )-----------( 127      ( 12 Sep 2018 12:00 )             24.2                         7.9    10.87 )-----------( 118      ( 12 Sep 2018 06:00 )             22.7                         7.8    10.97 )-----------( 122      ( 12 Sep 2018 02:05 )             22.2                         8.0    11.02 )-----------( 116      ( 12 Sep 2018 00:05 )             22.7                         9.0    11.07 )-----------( 124      ( 11 Sep 2018 19:27 )             25.3                         7.0    11.73 )-----------( 116      ( 11 Sep 2018 15:27 )             20.0                         7.0    12.67 )-----------( 129      ( 11 Sep 2018 13:00 )             20.0                         8.8    18.46 )-----------( 79       ( 11 Sep 2018 11:28 )             25.3                         8.0    17.10 )-----------( 69       ( 11 Sep 2018 09:43 )             22.9                         8.4    18.50 )-----------( 75       ( 11 Sep 2018 09:00 )             24.5                         6.9    18.46 )-----------( 72       ( 11 Sep 2018 04:10 )             20.5       09-12-18 @ 18:15      138  |  106  |  25<H>  ----------------------------<  87  4.5   |  18<L>  |  3.39<H>    09-12-18 @ 02:05      135  |  104  |  21  ----------------------------<  97  4.7   |  18<L>  |  2.62<H>    09-11-18 @ 19:27      135  |  106  |  19  ----------------------------<  113<H>  5.4<H>   |  19<L>  |  2.11<H>    09-11-18 @ 15:27      137  |  106  |  17  ----------------------------<  119<H>  4.9   |  20<L>  |  1.92<H>    09-11-18 @ 13:00      137  |  104  |  15  ----------------------------<  146<H>  6.4<HH>   |  17<L>  |  1.63<H>    09-11-18 @ 09:00      133<L>  |  104  |  17  ----------------------------<  88  5.4<H>   |  20<L>  |  1.75<H>    09-11-18 @ 04:10      134<L>  |  103  |  14  ----------------------------<  89  4.7   |  18<L>  |  1.31<H>    09-11-18 @ 01:20      136  |  102  |  12  ----------------------------<  93  4.2   |  17<L>  |  1.19    09-10-18 @ 18:16      134<L>  |  105  |  7   ----------------------------<  66<L>  4.0   |  19<L>  |  0.66        Ca    7.8<L>      12 Sep 2018 18:15  Ca    7.5<L>      12 Sep 2018 02:05  Ca    7.8<L>      11 Sep 2018 19:27  Ca    8.3<L>      11 Sep 2018 15:27  Ca    8.1<L>      11 Sep 2018 13:00  Ca    7.6<L>      11 Sep 2018 09:00  Ca    7.8<L>      11 Sep 2018 04:10  Ca    7.9<L>      11 Sep 2018 01:20  Ca    8.5      10 Sep 2018 18:16  Phos  6.4<H>     09-12  Phos  6.2<H>     09-11  Phos  5.7<H>     09-11  Mg     1.9     09-12  Mg     1.8     09-11  Mg     1.4<L>     09-11    TPro  4.3<L>  /  Alb  2.1<L>  /  TBili  0.4  /  DBili  x   /  AST  28  /  ALT  9   /  AlkPhos  42  09-12-18 @ 02:05  TPro  4.3<L>  /  Alb  2.4<L>  /  TBili  1.7<H>  /  DBili  x   /  AST  27  /  ALT  13  /  AlkPhos  45  09-11-18 @ 15:27  TPro  4.7<L>  /  Alb  2.6<L>  /  TBili  1.8<H>  /  DBili  x   /  AST  24  /  ALT  13  /  AlkPhos  46  09-11-18 @ 13:00  TPro  4.7<L>  /  Alb  2.4<L>  /  TBili  1.2  /  DBili  x   /  AST  43<H>  /  ALT  10  /  AlkPhos  74  09-11-18 @ 09:00  TPro  4.6<L>  /  Alb  2.2<L>  /  TBili  2.3<H>  /  DBili  x   /  AST  42<H>  /  ALT  10  /  AlkPhos  77  09-11-18 @ 04:10  TPro  4.7<L>  /  Alb  2.4<L>  /  TBili  2.6<H>  /  DBili  x   /  AST  44<H>  /  ALT  10  /  AlkPhos  89  09-11-18 @ 01:20  TPro  6.2  /  Alb  2.7<L>  /  TBili  0.6  /  DBili  x   /  AST  19  /  ALT  8   /  AlkPhos  109  09-10-18 @ 18:16

## 2018-09-13 NOTE — PROGRESS NOTE ADULT - ASSESSMENT
37y  POD#3 s/p emergent  with EBL of 900. She had 500cc PPH from the vagina overnight in the PACU.  On POD#1, patient underwent RTOR for intraabdominal hemorrhage with 1.5L evacuated and posterior uterus and hysterotomy oversewn and hemostatic agents placed over these sites. Hemostasis was unable to be obtained, likely 2/2 DIC and hysterectomy was performed. Total EBL was 7L. Total products given: 2PRBCpreop+7PRBC intraop+2PRBCpostop + 7FFP + 4Plt +4 cryo. She is currently hemodynamically stable in SICU without signs of further bleeding. DIC labs show resolution of coagulopathy.

## 2018-09-13 NOTE — PROGRESS NOTE ADULT - ATTENDING COMMENTS
Pt seen and examined by me today in SICU.  I agree with findings, assessment and plan documented in resident note.

## 2018-09-13 NOTE — PROGRESS NOTE ADULT - PROBLEM SELECTOR PLAN 1
Neuro: Pain controlled with dilaudid - patient has tylenol allergy and ALANIS so NSAIDs are contraindicated  CV: Hemodynamically stable with mildly elevated BP   Pulm: saturating well on nasal cannula  GI: NPO, awaiting flatus  : ALANIS with most recent Cr 3.39. UOP is adequate. Continue to monitor UOP and trend Cr/electrolytes.   Heme: Hct stable after hemorrhage with MTP. q8 labs currently ordered. Heparin and SCD for DVT PPX.   Dispo per SICU, appreciate SICU care  FITO Puente PGY3

## 2018-09-13 NOTE — PROGRESS NOTE ADULT - SUBJECTIVE AND OBJECTIVE BOX
Patient POD # 2 s/p /hysterectomy, massive transfusion and DIC.     Extubated yesterday.  Seen this morning sitting up in bed and feeling well.  Working on getting out of bed this morning to chair.  Patient appreciative for care.  No anesthetic complications.     Continued care in the SICU.      Nimisha Lyn MD

## 2018-09-13 NOTE — PROGRESS NOTE ADULT - ASSESSMENT
37y  POD#3 s/p emergent  with EBL of 900. She had 500cc PPH from the vagina overnight in the PACU.  On POD#1, patient underwent RTOR for intraabdominal hemorrhage with 1.5L evacuated and posterior uterus and hysterotomy oversewn, however hemostasis was unable to be obtained, likely 2/2 DIC and hysterectomy was performed. Total EBL was 7L. Total products given: 2PRBCpreop+7PRBC intraop+2PRBCpostop + 7FFP + 4Plt +4 cryo. She is currently hemodynamically stable in SICU without signs of further bleeding. DIC labs show resolution of coagulopathy. Extubated yesterday in SICU. She has ALANIS likely 2/2 poor perfusion during hemorrhage. UOP has been excellent however Cr noted to be trending up. Electrolytes have been WNL.

## 2018-09-13 NOTE — PROGRESS NOTE ADULT - ASSESSMENT
37y Female POD2 from primary cesarian section, POD 1 from re-op for hemoperitoneum, now s/p cesarian hysterectomy with total EBL 7L. Pt remains intubated.     PLAN:   Neurologic:   -off all sedation, mentating well    Respiratory:   -successfully extubated 9/12, tolerating NC 3L   -lung exam improved from yesterday, clear to auscultation bilaterally  -IS       Cardiovascular: s/p 9u pRBCs, 7u FFP, 4u platelets 4u cryo. Hemodynamically stable during OR, never required pressors  -hx of SVT s/p ablation in 2011, no subsequent episodes, no home medications   -After transfer to SICU - 2 more units PRBC was given (9/11/18) - None overnight       Gastrointestinal/Nutrition:   -NPO w/ sips/clears, advance as tolerated      Renal/Genitourinary: ALANIS   -LR@75 cc/hr  -Strict Is&Os  -Worsening creatinine, baseline 0.66. Likely due to large volume blood loss intraop. Will continue to trend  -BMP Q8H  -replete lytes as necessary      Hematologic:   -H/H stable, continue to trend q8h  -pt initially thought to be in DIC, preop plt 72, PT:13.7, PTT:65.4, INR: 1.19  -Coags have since normalized      Infectious Disease: No acute issues   -monitor temps    DVT ppx: subq heparin    Tubes/Lines/Drains:   2 R PIVs  R radial A line  Kapoor    Endocrine: No acute Issues   -monitor glucose via BMP      Disposition: Likely floor     Critical Care diagnosis  Hemorrhagic shock  ALANIS

## 2018-09-13 NOTE — PROGRESS NOTE ADULT - ATTENDING COMMENTS
37y Female POD2 from primary cesarian section, POD 1 from re-op for hemoperitoneum, now s/p cesarian hysterectomy with total EBL 7L. Pt remains intubated.     PLAN:   Neurologic:   -off all sedation, mentating well    Respiratory:   -successfully extubated 9/12, tolerating NC 3L   -lung exam improved from yesterday, clear to auscultation bilaterally  -IS       Cardiovascular: s/p 9u pRBCs, 7u FFP, 4u platelets 4u cryo. Hemodynamically stable during OR, never required pressors  -hx of SVT s/p ablation in 2011, no subsequent episodes, no home medications   -After transfer to SICU - 2 more units PRBC was given (9/11/18) - None overnight       Gastrointestinal/Nutrition:   -NPO w/ sips/clears, advance as tolerated      Renal/Genitourinary: ALANIS   -LR@75 cc/hr  -Strict Is&Os  -Worsening creatinine, baseline 0.66. Likely due to large volume blood loss intraop. Will continue to trend  -BMP Q8H  -replete lytes as necessary      Hematologic:   -H/H stable, continue to trend q8h  -pt initially thought to be in DIC, preop plt 72, PT:13.7, PTT:65.4, INR: 1.19  -Coags have since normalized      Infectious Disease: No acute issues   -monitor temps    DVT ppx: subq heparin    Tubes/Lines/Drains:   2 R PIVs  R radial A line  Kapoor    Endocrine: No acute Issues   -monitor glucose via BMP      Disposition: Likely floor     Critical Care diagnosis  Hemorrhagic shock  ALANIS  The patient is a critical care patient with life threatening hemodynamic and metabolic instability in SICU.  I have personally interviewed and examined the patient, reviewed data and laboratory tests/x-rays and all pertinent electronic images.  The SICU team has a constant risk benefit analyzes discussion with the primary team, all consultants, House Staff and PA's on all decisions.  These diagnoses are unrelated to the surgical procedure noted above.  I met with family if needed to get further history, discuss the case and make care decisions for this patient who might not be able to participate.  Time involved in performance of separately billable procedures was not counted toward my critical care time. There is no overlap.  I spent 55-75 minutes  in total providing critical care for the diagnoses, assessment and plan above.

## 2018-09-13 NOTE — DIETITIAN INITIAL EVALUATION ADULT. - OTHER INFO
Pt seen for critical care nutrition LOS.  At the time of visit, pt c/o feeling uncomfortable 2/2 gas pains.  She started taking sips water and jello at this time.  Pt is s/p  on .  Surgery for post partum hemorrhage on .  Pt extubated yesterday Pt denies food allergies, modified diet PTA or difficulties chewing/swallowing.  Noted in chart, diet to be advanced once pt starts to pass gas.

## 2018-09-13 NOTE — PROGRESS NOTE ADULT - SUBJECTIVE AND OBJECTIVE BOX
24 HOUR EVENTS: Extubated yesterday, tolerating nasal cannula, alert, intermittently anxious due to claustraphobia, worsened ALANIS on labs but appropriate UO.     SUBJECTIVE/ROS:  [X] A ten-point review of systems was otherwise negative except as noted.  [ ] Due to altered mental status/intubation, subjective information were not able to be obtained from the patient. History was obtained, to the extent possible, from review of the chart and collateral sources of information.      NEURO  RASS:     GCS:     CAM ICU:  Exam:   Meds: HYDROmorphone  Injectable 0.5 milliGRAM(s) IV Push every 4 hours PRN Severe Pain (7 - 10)    [x] Adequacy of sedation and pain control has been assessed and adjusted      RESPIRATORY  RR: 18 (09-13-18 @ 02:00) (11 - 24)  SpO2: 98% (09-13-18 @ 02:00) (95% - 100%)  Wt(kg): --  Exam: unlabored, clear to auscultation bilaterally  Mechanical Ventilation: Mode: standby,pt extubated to 40% o2 via f/t  ABG - ( 13 Sep 2018 03:50 )  pH: 7.46  /  pCO2: 27    /  pO2: 125   / HCO3: 21    / Base Excess: -4.5  /  SaO2: 98.3    Lactate: 0.7              [ ] Extubation Readiness Assessed  Meds:       CARDIOVASCULAR  HR: 73 (09-13-18 @ 02:00) (66 - 125)  BP: --  BP(mean): --  ABP: 157/76 (09-13-18 @ 02:00) (100/57 - 157/77)  ABP(mean): 104 (09-13-18 @ 02:00) (71 - 105)  Wt(kg): --  CVP(cm H2O): --      Exam:  Cardiac Rhythm:  Perfusion     [ ]Adequate   [ ]Inadequate  Mentation   [ ]Normal       [ ]Reduced  Extremities  [ ]Warm         [ ]Cool  Volume Status [ ]Hypervolemic [ ]Euvolemic [ ]Hypovolemic  Meds:       GI/NUTRITION  Exam:  Diet:  Meds:     GENITOURINARY  I&O's Detail    09-11 @ 07:01  -  09-12 @ 07:00  --------------------------------------------------------  IN:    fentaNYL  Infusion: 223.2 mL    fentaNYL  Infusion: 46.5 mL    IV PiggyBack: 100 mL    lactated ringers.: 2125 mL    Other: 11901 mL    Packed Red Blood Cells: 600 mL    propofol Infusion: 108.5 mL    propofol Infusion: 300.2 mL  Total IN: 03841.4 mL    OUT:    Bulb: 865 mL    Estimated Blood Loss: 5500 mL    Indwelling Catheter - Urethral: 1100 mL  Total OUT: 7465 mL    Total NET: 7034.4 mL      09-12 @ 07:01 - 09-13 @ 06:07  --------------------------------------------------------  IN:    fentaNYL  Infusion: 51 mL    lactated ringers.: 1550 mL    propofol Infusion: 55.6 mL  Total IN: 1656.6 mL    OUT:    Bulb: 50 mL    Indwelling Catheter - Urethral: 2590 mL  Total OUT: 2640 mL    Total NET: -983.4 mL          09-13    138  |  106  |  27<H>  ----------------------------<  81  4.3   |  18<L>  |  3.69<H>    Ca    7.6<L>      13 Sep 2018 03:50  Phos  5.8     09-13  Mg     2.2     09-13    TPro  4.5<L>  /  Alb  2.2<L>  /  TBili  0.5  /  DBili  x   /  AST  31  /  ALT  5   /  AlkPhos  51  09-13    [ ] Kapoor catheter, indication: N/A  Meds: lactated ringers. 1000 milliLiter(s) IV Continuous <Continuous>        HEMATOLOGIC  Meds: heparin  Injectable 5000 Unit(s) SubCutaneous every 8 hours    [x] VTE Prophylaxis                        7.9    14.00 )-----------( 130      ( 13 Sep 2018 03:50 )             23.3     PT/INR - ( 12 Sep 2018 06:00 )   PT: 11.0 SEC;   INR: 0.99          PTT - ( 13 Sep 2018 03:50 )  PTT:26.5 SEC  Transfusion     [ ] PRBC   [ ] Platelets   [ ] FFP   [ ] Cryoprecipitate      INFECTIOUS DISEASES  T(C): 38.1 (09-12-18 @ 20:00), Max: 38.1 (09-12-18 @ 20:00)  Wt(kg): --  WBC Count: 14.00 K/uL (09-13 @ 03:50)  WBC Count: 14.76 K/uL (09-12 @ 21:55)  WBC Count: 14.93 K/uL (09-12 @ 18:15)  WBC Count: 14.02 K/uL (09-12 @ 12:00)    Recent Cultures:  Specimen Source: BLOOD VENOUS, 09-11 @ 04:28; Results --; Gram Stain: --; Organism: --    Meds: influenza   Vaccine 0.5 milliLiter(s) IntraMuscular once        ENDOCRINE  Capillary Blood Glucose    Meds:       ACCESS DEVICES:  [ ] Peripheral IV  [ ] Central Venous Line	[ ] R	[ ] L	[ ] IJ	[ ] Fem	[ ] SC	Placed:   [ ] Arterial Line		[ ] R	[ ] L	[ ] Fem	[ ] Rad	[ ] Ax	Placed:   [ ] PICC:					[ ] Mediport  [ ] Urinary Catheter, Date Placed:   [ ] Necessity of urinary, arterial, and venous catheters discussed    OTHER MEDICATIONS:  chlorhexidine 4% Liquid 1 Application(s) Topical <User Schedule>      CODE STATUS:     IMAGING:

## 2018-09-14 DIAGNOSIS — F43.22 ADJUSTMENT DISORDER WITH ANXIETY: ICD-10-CM

## 2018-09-14 LAB
ALBUMIN SERPL ELPH-MCNC: 2.4 G/DL — LOW (ref 3.3–5)
ALP SERPL-CCNC: 62 U/L — SIGNIFICANT CHANGE UP (ref 40–120)
ALT FLD-CCNC: 11 U/L — SIGNIFICANT CHANGE UP (ref 4–33)
AST SERPL-CCNC: 37 U/L — HIGH (ref 4–32)
BASOPHILS # BLD AUTO: 0.04 K/UL — SIGNIFICANT CHANGE UP (ref 0–0.2)
BASOPHILS NFR BLD AUTO: 0.3 % — SIGNIFICANT CHANGE UP (ref 0–2)
BILIRUB SERPL-MCNC: 0.5 MG/DL — SIGNIFICANT CHANGE UP (ref 0.2–1.2)
BUN SERPL-MCNC: 36 MG/DL — HIGH (ref 7–23)
CALCIUM SERPL-MCNC: 7.8 MG/DL — LOW (ref 8.4–10.5)
CHLORIDE SERPL-SCNC: 103 MMOL/L — SIGNIFICANT CHANGE UP (ref 98–107)
CO2 SERPL-SCNC: 21 MMOL/L — LOW (ref 22–31)
CREAT SERPL-MCNC: 3.6 MG/DL — HIGH (ref 0.5–1.3)
EOSINOPHIL # BLD AUTO: 0.22 K/UL — SIGNIFICANT CHANGE UP (ref 0–0.5)
EOSINOPHIL NFR BLD AUTO: 1.8 % — SIGNIFICANT CHANGE UP (ref 0–6)
FIBRINOGEN PPP-MCNC: 888.4 MG/DL — HIGH (ref 310–510)
GLUCOSE SERPL-MCNC: 82 MG/DL — SIGNIFICANT CHANGE UP (ref 70–99)
HCT VFR BLD CALC: 26.8 % — LOW (ref 34.5–45)
HGB BLD-MCNC: 9 G/DL — LOW (ref 11.5–15.5)
IMM GRANULOCYTES # BLD AUTO: 0.08 # — SIGNIFICANT CHANGE UP
IMM GRANULOCYTES NFR BLD AUTO: 0.7 % — SIGNIFICANT CHANGE UP (ref 0–1.5)
LYMPHOCYTES # BLD AUTO: 1.7 K/UL — SIGNIFICANT CHANGE UP (ref 1–3.3)
LYMPHOCYTES # BLD AUTO: 13.8 % — SIGNIFICANT CHANGE UP (ref 13–44)
MCHC RBC-ENTMCNC: 30.4 PG — SIGNIFICANT CHANGE UP (ref 27–34)
MCHC RBC-ENTMCNC: 33.6 % — SIGNIFICANT CHANGE UP (ref 32–36)
MCV RBC AUTO: 90.5 FL — SIGNIFICANT CHANGE UP (ref 80–100)
MONOCYTES # BLD AUTO: 0.81 K/UL — SIGNIFICANT CHANGE UP (ref 0–0.9)
MONOCYTES NFR BLD AUTO: 6.6 % — SIGNIFICANT CHANGE UP (ref 2–14)
NEUTROPHILS # BLD AUTO: 9.44 K/UL — HIGH (ref 1.8–7.4)
NEUTROPHILS NFR BLD AUTO: 76.8 % — SIGNIFICANT CHANGE UP (ref 43–77)
NRBC # FLD: 0 — SIGNIFICANT CHANGE UP
PLATELET # BLD AUTO: 200 K/UL — SIGNIFICANT CHANGE UP (ref 150–400)
PMV BLD: 11.1 FL — SIGNIFICANT CHANGE UP (ref 7–13)
POTASSIUM SERPL-MCNC: 4.2 MMOL/L — SIGNIFICANT CHANGE UP (ref 3.5–5.3)
POTASSIUM SERPL-SCNC: 4.2 MMOL/L — SIGNIFICANT CHANGE UP (ref 3.5–5.3)
PROT SERPL-MCNC: 5.7 G/DL — LOW (ref 6–8.3)
RBC # BLD: 2.96 M/UL — LOW (ref 3.8–5.2)
RBC # FLD: 15.6 % — HIGH (ref 10.3–14.5)
SODIUM SERPL-SCNC: 138 MMOL/L — SIGNIFICANT CHANGE UP (ref 135–145)
WBC # BLD: 12.29 K/UL — HIGH (ref 3.8–10.5)
WBC # FLD AUTO: 12.29 K/UL — HIGH (ref 3.8–10.5)

## 2018-09-14 PROCEDURE — 90792 PSYCH DIAG EVAL W/MED SRVCS: CPT

## 2018-09-14 RX ORDER — ZINC OXIDE 200 MG/G
1 OINTMENT TOPICAL THREE TIMES A DAY
Qty: 0 | Refills: 0 | Status: DISCONTINUED | OUTPATIENT
Start: 2018-09-14 | End: 2018-09-18

## 2018-09-14 RX ORDER — LANOLIN ALCOHOL/MO/W.PET/CERES
6 CREAM (GRAM) TOPICAL AT BEDTIME
Qty: 0 | Refills: 0 | Status: DISCONTINUED | OUTPATIENT
Start: 2018-09-14 | End: 2018-09-18

## 2018-09-14 RX ORDER — DIPHENHYDRAMINE HCL 50 MG
25 CAPSULE ORAL AT BEDTIME
Qty: 0 | Refills: 0 | Status: DISCONTINUED | OUTPATIENT
Start: 2018-09-14 | End: 2018-09-18

## 2018-09-14 RX ADMIN — Medication 100 MILLIGRAM(S): at 21:45

## 2018-09-14 RX ADMIN — OXYCODONE HYDROCHLORIDE 5 MILLIGRAM(S): 5 TABLET ORAL at 09:13

## 2018-09-14 RX ADMIN — HEPARIN SODIUM 5000 UNIT(S): 5000 INJECTION INTRAVENOUS; SUBCUTANEOUS at 15:23

## 2018-09-14 RX ADMIN — SIMETHICONE 80 MILLIGRAM(S): 80 TABLET, CHEWABLE ORAL at 06:03

## 2018-09-14 RX ADMIN — OXYCODONE HYDROCHLORIDE 5 MILLIGRAM(S): 5 TABLET ORAL at 17:09

## 2018-09-14 RX ADMIN — ONDANSETRON 4 MILLIGRAM(S): 8 TABLET, FILM COATED ORAL at 18:32

## 2018-09-14 RX ADMIN — ZINC OXIDE 1 APPLICATION(S): 200 OINTMENT TOPICAL at 21:47

## 2018-09-14 RX ADMIN — SIMETHICONE 80 MILLIGRAM(S): 80 TABLET, CHEWABLE ORAL at 15:28

## 2018-09-14 RX ADMIN — Medication 1 APPLICATION(S): at 15:25

## 2018-09-14 RX ADMIN — HEPARIN SODIUM 5000 UNIT(S): 5000 INJECTION INTRAVENOUS; SUBCUTANEOUS at 21:44

## 2018-09-14 RX ADMIN — OXYCODONE HYDROCHLORIDE 5 MILLIGRAM(S): 5 TABLET ORAL at 18:33

## 2018-09-14 RX ADMIN — ZINC OXIDE 1 APPLICATION(S): 200 OINTMENT TOPICAL at 15:24

## 2018-09-14 RX ADMIN — OXYCODONE HYDROCHLORIDE 5 MILLIGRAM(S): 5 TABLET ORAL at 22:30

## 2018-09-14 RX ADMIN — SIMETHICONE 80 MILLIGRAM(S): 80 TABLET, CHEWABLE ORAL at 21:46

## 2018-09-14 RX ADMIN — ONDANSETRON 4 MILLIGRAM(S): 8 TABLET, FILM COATED ORAL at 02:01

## 2018-09-14 RX ADMIN — OXYCODONE HYDROCHLORIDE 5 MILLIGRAM(S): 5 TABLET ORAL at 02:31

## 2018-09-14 RX ADMIN — OXYCODONE HYDROCHLORIDE 5 MILLIGRAM(S): 5 TABLET ORAL at 21:44

## 2018-09-14 RX ADMIN — OXYCODONE HYDROCHLORIDE 5 MILLIGRAM(S): 5 TABLET ORAL at 19:00

## 2018-09-14 RX ADMIN — Medication 100 MILLIGRAM(S): at 09:13

## 2018-09-14 RX ADMIN — OXYCODONE HYDROCHLORIDE 5 MILLIGRAM(S): 5 TABLET ORAL at 06:34

## 2018-09-14 RX ADMIN — HEPARIN SODIUM 5000 UNIT(S): 5000 INJECTION INTRAVENOUS; SUBCUTANEOUS at 06:03

## 2018-09-14 RX ADMIN — OXYCODONE HYDROCHLORIDE 5 MILLIGRAM(S): 5 TABLET ORAL at 15:24

## 2018-09-14 RX ADMIN — OXYCODONE HYDROCHLORIDE 5 MILLIGRAM(S): 5 TABLET ORAL at 02:01

## 2018-09-14 RX ADMIN — OXYCODONE HYDROCHLORIDE 5 MILLIGRAM(S): 5 TABLET ORAL at 11:08

## 2018-09-14 RX ADMIN — OXYCODONE HYDROCHLORIDE 5 MILLIGRAM(S): 5 TABLET ORAL at 06:04

## 2018-09-14 RX ADMIN — OXYCODONE HYDROCHLORIDE 5 MILLIGRAM(S): 5 TABLET ORAL at 15:49

## 2018-09-14 RX ADMIN — OXYCODONE HYDROCHLORIDE 5 MILLIGRAM(S): 5 TABLET ORAL at 12:31

## 2018-09-14 NOTE — PROGRESS NOTE ADULT - SUBJECTIVE AND OBJECTIVE BOX
PAPITO PRESTON Progress Note    POD#4   HD#7    Patient seen and examined at bedside.  No acute events overnight.  Patient feels subjectively better this AM.  States passed small amount of gas this AM.  Denies CP/SOB, n/v, f/c.  Reports continued anxiety however improved since yesterday.  Reprots pain controlled on PO pain medication.      Vital Signs Last 24 Hours  T(C): 36.9 (09-14-18 @ 01:43), Max: 37.1 (09-13-18 @ 22:21)  HR: 68 (09-14-18 @ 01:43) (66 - 84)  BP: 122/66 (09-14-18 @ 01:43) (120/65 - 136/66)  RR: 18 (09-14-18 @ 01:43) (17 - 23)  SpO2: 97% (09-14-18 @ 01:43) (94% - 100%)    I&O's Summary    13 Sep 2018 07:01  -  14 Sep 2018 07:00  --------------------------------------------------------  IN: 450 mL / OUT: 1835 mL / NET: -1385 mL        Physical Exam:  General: NAD  CV: RR, S1, S2, no M/R/G  Lungs: CTA b/l, good air flow b/l   Abdomen: Soft, tender to palpation, +bowel sounds   Incision:  patient refused examination of incision.    Ext: No pain or swelling     Labs:                        9.0    12.29 )-----------( 200      ( 14 Sep 2018 06:40 )             26.8   baso 0.3    eos 1.8    imm gran 0.7    lymph 13.8   mono 6.6    poly 76.8                         8.4    15.68 )-----------( 173      ( 13 Sep 2018 19:36 )             25.0   baso 0.3    eos 0.2    imm gran 0.5    lymph 8.6    mono 5.5    poly 84.9                         7.9    14.00 )-----------( 130      ( 13 Sep 2018 03:50 )             23.3   baso x      eos x      imm gran x      lymph x      mono x      poly x                            8.2    14.76 )-----------( 135      ( 12 Sep 2018 21:55 )             23.9   baso x      eos x      imm gran x      lymph x      mono x      poly x                            8.3    14.93 )-----------( 132      ( 12 Sep 2018 18:15 )             23.9   baso x      eos x      imm gran x      lymph x      mono x      poly x                            8.6    14.02 )-----------( 127      ( 12 Sep 2018 12:00 )             24.2   baso x      eos x      imm gran x      lymph x      mono x      poly x                            7.9    10.87 )-----------( 118      ( 12 Sep 2018 06:00 )             22.7   baso x      eos x      imm gran x      lymph x      mono x      poly x                            7.8    10.97 )-----------( 122      ( 12 Sep 2018 02:05 )             22.2   baso x      eos x      imm gran x      lymph x      mono x      poly x                            8.0    11.02 )-----------( 116      ( 12 Sep 2018 00:05 )             22.7   baso x      eos x      imm gran x      lymph x      mono x      poly x                            9.0    11.07 )-----------( 124      ( 11 Sep 2018 19:27 )             25.3   baso x      eos x      imm gran x      lymph x      mono x      poly x                            7.0    11.73 )-----------( 116      ( 11 Sep 2018 15:27 )             20.0   baso 0.1    eos 0.0    imm gran 0.3    lymph 6.0    mono 5.8    poly 87.8                         7.0    12.67 )-----------( 129      ( 11 Sep 2018 13:00 )             20.0   baso 0.1    eos 0.0    imm gran 0.4    lymph 3.2    mono 6.1    poly 90.2                         8.8    18.46 )-----------( 79       ( 11 Sep 2018 11:28 )             25.3   baso x      eos x      imm gran x      lymph x      mono x      poly x                            8.0    17.10 )-----------( 69       ( 11 Sep 2018 09:43 )             22.9   baso x      eos x      imm gran x      lymph x      mono x      poly x          MEDICATIONS  (STANDING):  diphtheria/tetanus/pertussis (acellular) Vaccine (ADAcel) 0.5 milliLiter(s) IntraMuscular once  ferrous    sulfate 325 milliGRAM(s) Oral daily  heparin  Injectable 5000 Unit(s) SubCutaneous every 8 hours  influenza   Vaccine 0.5 milliLiter(s) IntraMuscular once  lactated ringers. 1000 milliLiter(s) (75 mL/Hr) IV Continuous <Continuous>  ondansetron    Tablet 4 milliGRAM(s) Oral once  prenatal multivitamin 1 Tablet(s) Oral daily    MEDICATIONS  (PRN):  diphenhydrAMINE   Capsule 25 milliGRAM(s) Oral every 6 hours PRN Itching  docusate sodium 100 milliGRAM(s) Oral two times a day PRN Stool Softening  glycerin Suppository - Adult 1 Suppository(s) Rectal at bedtime PRN Constipation  HYDROmorphone  Injectable 0.5 milliGRAM(s) IV Push every 4 hours PRN Severe Pain (7 - 10)  lanolin Ointment 1 Application(s) Topical every 3 hours PRN Sore Nipples  naloxone Injectable 0.1 milliGRAM(s) IV Push every 3 minutes PRN For ANY of the following changes in patient status:  A. RR LESS THAN 10 breaths per minute, B. Oxygen saturation LESS THAN 90%, C. Sedation score of 6  ondansetron    Tablet 4 milliGRAM(s) Oral every 6 hours PRN Nausea and/or Vomiting  oxyCODONE    IR 5 milliGRAM(s) Oral every 3 hours PRN Mild Pain (1 - 3)  oxyCODONE    IR 10 milliGRAM(s) Oral every 3 hours PRN Moderate Pain (4 - 6)  simethicone 80 milliGRAM(s) Chew two times a day PRN Gas  simethicone 80 milliGRAM(s) Chew every 4 hours PRN Gas

## 2018-09-14 NOTE — PROGRESS NOTE ADULT - ASSESSMENT
37y  POD#4 s/p emergent  with EBL of 900. She had 500cc PPH from the vagina overnight in the PACU.  On POD#1, patient underwent RTOR for intraabdominal hemorrhage with 1.5L evacuated and posterior uterus and hysterotomy oversewn, however hemostasis was unable to be obtained, likely 2/2 DIC and hysterectomy was performed. Total EBL was 7L. Total products given: 2PRBCpreop+7PRBC intraop+2PRBCpostop + 7FFP + 4Plt +4 cryo. She is now s/p SICU stay.  She is currently hemodynamically stable  without signs of further bleeding. DIC labs show resolution of coagulopathy.  She has resolving ALANIS likely 2/2 poor perfusion during hemorrhage. UOP has been excellent however Cr noted to be trending up-> now tredning down. Electrolytes have been WNL.

## 2018-09-14 NOTE — BEHAVIORAL HEALTH ASSESSMENT NOTE - NSBHCHARTREVIEWVS_PSY_A_CORE FT
Vital Signs Last 24 Hrs  T(C): 37.1 (14 Sep 2018 10:09), Max: 37.1 (13 Sep 2018 22:21)  T(F): 98.7 (14 Sep 2018 10:09), Max: 98.7 (13 Sep 2018 22:21)  HR: 70 (14 Sep 2018 10:09) (66 - 71)  BP: 123/73 (14 Sep 2018 10:09) (120/65 - 136/66)  BP(mean): --  RR: 20 (14 Sep 2018 10:09) (17 - 20)  SpO2: 100% (14 Sep 2018 10:09) (97% - 100%)

## 2018-09-14 NOTE — PROGRESS NOTE ADULT - ATTENDING COMMENTS
Patient seen and examined by me.    Pain management improved with current PO regimen  Feels "gassy" but denies nausea or vomiting; tolerating clears and apple sauce--advised continued ambulation as tolerated and advancing to regular diet  Incision--mild ecchymosis on Mons pubis, otherwise no to minimal surrounding erythema in area of previous incision tape--advised silver sulfadine; dressing removed from BRISEIDA drain site, no oozing  Pt satisfied with social work involvement and desires f/up today   All questions and concerns were addressed

## 2018-09-14 NOTE — BEHAVIORAL HEALTH ASSESSMENT NOTE - SUMMARY
Pt is a 37 yr old woman,  POD#4 s/p emergent  with return to the OR because of continuous intraabdominal hemorrhage and inability to achieve hemostasis because of DIC resulting in need for a hysterectomy and large quantity of blood products and IV fluids.  Pt developed ALANIS from poor perfusion secondary to the hemorrhage and DIC, now partially resolving.  She is very anxious and worried that she will not be able to perform all aspects of caring for her  baby, but is responsive to support and hopeful that she will recover both physically and mentally from her long complicated hospital stay.  She has no significant past psychiatric history and no past history of any suicidal ideation and no history of substance use. She wants to seek therapy and was very receptive to having outpatient  follow up at the  psychiatry program at [facility]. She has numbers for the outpt program [phone] (4mom).  She may benefit from Melatonin 6mg po qhs and Benadryl 25mg to 50mg po qhs prn insomnia.

## 2018-09-14 NOTE — BEHAVIORAL HEALTH ASSESSMENT NOTE - NSBHCONSULTFOLLOWAFTERCARE_PSY_A_CORE FT
Pt may f/u at the  psychiatry program at [facility]. She has numbers for the outpt program [phone] (4mom).

## 2018-09-14 NOTE — PROGRESS NOTE ADULT - PROBLEM SELECTOR PLAN 1
Neuro: oxycodone for pain control (patient reported intolerance to dialudid yesterday).   Card: hemodynamically stable, cont to monitor BPs.  H/H stable.       -anemia: can consider Fe/Coalce/Vit C  Resp: saturating well on room air.   GI: clear diet.  continue advancing later today as GI function improves.   : voiding spontanously. UOP adequate.      -ALANIS: Cr improving today.  continue to trend.  continue fluids.   heme: anemia 2/2 blood loss now stable. start SQH for DVT ppx. scd/oob/amb for dvt ppx  dispo:  continued impatient care    Kellie Elis PGY3

## 2018-09-14 NOTE — BEHAVIORAL HEALTH ASSESSMENT NOTE - NSBHCHARTREVIEWLAB_PSY_A_CORE FT
9.0    12.29 )-----------( 200      ( 14 Sep 2018 06:40 )             26.8   09-14    138  |  103  |  36<H>  ----------------------------<  82  4.2   |  21<L>  |  3.60<H>    Ca    7.8<L>      14 Sep 2018 06:40  Phos  5.8     09-13  Mg     2.2     09-13    TPro  5.7<L>  /  Alb  2.4<L>  /  TBili  0.5  /  DBili  x   /  AST  37<H>  /  ALT  11  /  AlkPhos  62  09-14

## 2018-09-14 NOTE — BEHAVIORAL HEALTH ASSESSMENT NOTE - HPI (INCLUDE ILLNESS QUALITY, SEVERITY, DURATION, TIMING, CONTEXT, MODIFYING FACTORS, ASSOCIATED SIGNS AND SYMPTOMS)
Pt is a 37 yr old woman,  POD#4 s/p emergent  with return to the OR because of continuous intraabdominal hemorrhage and inability to achieve hemostasis because of DIC resulting in need for a hysterectomy and large quantity of blood products and IV fluids.  Pt developed ALANIS from poor perfusion secondary to the hemorrhage and DIC, now partially resolving.  She is very anxious and worried that she will not be able to perform all aspects of caring for her  baby, but is responsive to support and hopeful that she will recover both physically and mentally from her long complicated hospital stay.  She understands the serious nature of her bleeding and DIC and is aware that she survived despite the high risk of demise.  She is concerned about her  and fears that he has suffered psychologically from her near death experience.  She says that he recently lost a good friend who  suddenly and fears that he may have been traumatized by her serious complications following the birth of their first child.  She also fears that he may feel guilty for consenting for the hysterectomy though he told her that he understood that she may not have survived and may have continued to hemorrhage.  She says she felt much better after crying and speaking to an on-call  last night after she was able to talk about all the sadness and trauma of the surgeries and SICU stay. She had no significant past psychiatric history and no past history of any suicidal ideation and no history of substance use. She wants to seek therapy and was very receptive to having outpatient  follow up at the  psychiatry program at [facility]. Pt is a 37 yr old woman,  POD#4 s/p emergent  with return to the OR because of continuous intraabdominal hemorrhage and inability to achieve hemostasis because of DIC resulting in need for a hysterectomy and large quantity of blood products and IV fluids.  Pt developed ALANIS from poor perfusion secondary to the hemorrhage and DIC, now partially resolving.  She is very anxious and worried that she will not be able to perform all aspects of caring for her  baby, but is responsive to support and hopeful that she will recover both physically and mentally from her long complicated hospital stay.  She understands the serious nature of her bleeding and DIC and is aware that she survived despite the high risk of demise.  She is concerned about her  and fears that he has suffered psychologically from her near death experience.  She says that he recently lost a good friend who  suddenly and fears that he may have been traumatized by her serious complications following the birth of their first child.  She also fears that he may feel guilty for consenting for the hysterectomy though he told her that he understood that she may not have survived and may have continued to hemorrhage.  She says she felt much better after crying and speaking to an on-call  last night after she was able to talk about all the sadness and trauma of the surgeries and SICU stay. She has no significant past psychiatric history and no past history of any suicidal ideation and no history of substance use. She wants to seek therapy and was very receptive to having outpatient  follow up at the  psychiatry program at [facility].

## 2018-09-14 NOTE — BEHAVIORAL HEALTH ASSESSMENT NOTE - AXIS III
POD#4 s/p emergent  with return to the OR because of continuous intraabdominal hemorrhage and inability to achieve hemostasis because of DIC resulting in need for a hysterectomy and large quantity of blood products and IV fluids.

## 2018-09-14 NOTE — BEHAVIORAL HEALTH ASSESSMENT NOTE - NSBHSUICPROTECTFACT_PSY_A_CORE
Identifies reasons for living/Supportive social network or family/Engaged in work or school/Future oriented/Responsibility to family and others/Fear of death or dying due to pain/suffering

## 2018-09-15 LAB
ALBUMIN SERPL ELPH-MCNC: 2.3 G/DL — LOW (ref 3.3–5)
ALP SERPL-CCNC: 57 U/L — SIGNIFICANT CHANGE UP (ref 40–120)
ALT FLD-CCNC: 7 U/L — SIGNIFICANT CHANGE UP (ref 4–33)
APTT BLD: 26 SEC — LOW (ref 27.5–37.4)
AST SERPL-CCNC: 21 U/L — SIGNIFICANT CHANGE UP (ref 4–32)
BILIRUB SERPL-MCNC: 0.5 MG/DL — SIGNIFICANT CHANGE UP (ref 0.2–1.2)
BUN SERPL-MCNC: 35 MG/DL — HIGH (ref 7–23)
CALCIUM SERPL-MCNC: 7.7 MG/DL — LOW (ref 8.4–10.5)
CHLORIDE SERPL-SCNC: 103 MMOL/L — SIGNIFICANT CHANGE UP (ref 98–107)
CO2 SERPL-SCNC: 20 MMOL/L — LOW (ref 22–31)
CREAT SERPL-MCNC: 3.04 MG/DL — HIGH (ref 0.5–1.3)
GLUCOSE SERPL-MCNC: 93 MG/DL — SIGNIFICANT CHANGE UP (ref 70–99)
HCT VFR BLD CALC: 25.4 % — LOW (ref 34.5–45)
HGB BLD-MCNC: 8.3 G/DL — LOW (ref 11.5–15.5)
INR BLD: 0.89 — SIGNIFICANT CHANGE UP (ref 0.88–1.17)
MCHC RBC-ENTMCNC: 29.9 PG — SIGNIFICANT CHANGE UP (ref 27–34)
MCHC RBC-ENTMCNC: 32.7 % — SIGNIFICANT CHANGE UP (ref 32–36)
MCV RBC AUTO: 91.4 FL — SIGNIFICANT CHANGE UP (ref 80–100)
NRBC # FLD: 0 — SIGNIFICANT CHANGE UP
PLATELET # BLD AUTO: 216 K/UL — SIGNIFICANT CHANGE UP (ref 150–400)
PMV BLD: 10.9 FL — SIGNIFICANT CHANGE UP (ref 7–13)
POTASSIUM SERPL-MCNC: 4 MMOL/L — SIGNIFICANT CHANGE UP (ref 3.5–5.3)
POTASSIUM SERPL-SCNC: 4 MMOL/L — SIGNIFICANT CHANGE UP (ref 3.5–5.3)
PROT SERPL-MCNC: 5.5 G/DL — LOW (ref 6–8.3)
PROTHROM AB SERPL-ACNC: 10.2 SEC — SIGNIFICANT CHANGE UP (ref 9.8–13.1)
RBC # BLD: 2.78 M/UL — LOW (ref 3.8–5.2)
RBC # FLD: 14.9 % — HIGH (ref 10.3–14.5)
SODIUM SERPL-SCNC: 137 MMOL/L — SIGNIFICANT CHANGE UP (ref 135–145)
WBC # BLD: 8.85 K/UL — SIGNIFICANT CHANGE UP (ref 3.8–10.5)
WBC # FLD AUTO: 8.85 K/UL — SIGNIFICANT CHANGE UP (ref 3.8–10.5)

## 2018-09-15 RX ADMIN — HEPARIN SODIUM 5000 UNIT(S): 5000 INJECTION INTRAVENOUS; SUBCUTANEOUS at 22:39

## 2018-09-15 RX ADMIN — ONDANSETRON 4 MILLIGRAM(S): 8 TABLET, FILM COATED ORAL at 22:15

## 2018-09-15 RX ADMIN — OXYCODONE HYDROCHLORIDE 5 MILLIGRAM(S): 5 TABLET ORAL at 06:07

## 2018-09-15 RX ADMIN — OXYCODONE HYDROCHLORIDE 5 MILLIGRAM(S): 5 TABLET ORAL at 19:14

## 2018-09-15 RX ADMIN — SIMETHICONE 80 MILLIGRAM(S): 80 TABLET, CHEWABLE ORAL at 15:43

## 2018-09-15 RX ADMIN — OXYCODONE HYDROCHLORIDE 5 MILLIGRAM(S): 5 TABLET ORAL at 01:18

## 2018-09-15 RX ADMIN — Medication 100 MILLIGRAM(S): at 06:07

## 2018-09-15 RX ADMIN — OXYCODONE HYDROCHLORIDE 5 MILLIGRAM(S): 5 TABLET ORAL at 09:07

## 2018-09-15 RX ADMIN — SIMETHICONE 80 MILLIGRAM(S): 80 TABLET, CHEWABLE ORAL at 22:16

## 2018-09-15 RX ADMIN — OXYCODONE HYDROCHLORIDE 5 MILLIGRAM(S): 5 TABLET ORAL at 15:42

## 2018-09-15 RX ADMIN — HEPARIN SODIUM 5000 UNIT(S): 5000 INJECTION INTRAVENOUS; SUBCUTANEOUS at 13:53

## 2018-09-15 RX ADMIN — OXYCODONE HYDROCHLORIDE 5 MILLIGRAM(S): 5 TABLET ORAL at 13:00

## 2018-09-15 RX ADMIN — OXYCODONE HYDROCHLORIDE 5 MILLIGRAM(S): 5 TABLET ORAL at 12:20

## 2018-09-15 RX ADMIN — HEPARIN SODIUM 5000 UNIT(S): 5000 INJECTION INTRAVENOUS; SUBCUTANEOUS at 06:07

## 2018-09-15 RX ADMIN — ONDANSETRON 4 MILLIGRAM(S): 8 TABLET, FILM COATED ORAL at 08:21

## 2018-09-15 RX ADMIN — OXYCODONE HYDROCHLORIDE 5 MILLIGRAM(S): 5 TABLET ORAL at 22:45

## 2018-09-15 RX ADMIN — OXYCODONE HYDROCHLORIDE 5 MILLIGRAM(S): 5 TABLET ORAL at 02:42

## 2018-09-15 RX ADMIN — SIMETHICONE 80 MILLIGRAM(S): 80 TABLET, CHEWABLE ORAL at 06:07

## 2018-09-15 RX ADMIN — OXYCODONE HYDROCHLORIDE 5 MILLIGRAM(S): 5 TABLET ORAL at 18:27

## 2018-09-15 RX ADMIN — OXYCODONE HYDROCHLORIDE 5 MILLIGRAM(S): 5 TABLET ORAL at 22:15

## 2018-09-15 RX ADMIN — ZINC OXIDE 1 APPLICATION(S): 200 OINTMENT TOPICAL at 19:16

## 2018-09-15 RX ADMIN — Medication 100 MILLIGRAM(S): at 18:56

## 2018-09-15 RX ADMIN — ZINC OXIDE 1 APPLICATION(S): 200 OINTMENT TOPICAL at 12:07

## 2018-09-15 RX ADMIN — Medication 325 MILLIGRAM(S): at 12:08

## 2018-09-15 RX ADMIN — OXYCODONE HYDROCHLORIDE 5 MILLIGRAM(S): 5 TABLET ORAL at 18:56

## 2018-09-15 RX ADMIN — OXYCODONE HYDROCHLORIDE 5 MILLIGRAM(S): 5 TABLET ORAL at 09:55

## 2018-09-15 RX ADMIN — ONDANSETRON 4 MILLIGRAM(S): 8 TABLET, FILM COATED ORAL at 01:18

## 2018-09-15 NOTE — PROGRESS NOTE ADULT - SUBJECTIVE AND OBJECTIVE BOX
R3 OB Postpartum Note    Patient seen and examined at bedside.  No acute events overnight.  Patient has no complaints and pain is well-controlled on oral analgesia.  Patient is tolerating regular diet, passing flatus, ambulating, and is voiding spontaneously.  Postpartum bleeding is well controlled.  She is bottle feeding her baby.    Physical exam:    Vital Signs Last 24 Hrs  T(C): 37.1 (14 Sep 2018 21:42), Max: 37.1 (14 Sep 2018 10:09)  T(F): 98.7 (14 Sep 2018 21:42), Max: 98.8 (14 Sep 2018 18:08)  HR: 67 (14 Sep 2018 21:42) (67 - 70)  BP: 124/69 (14 Sep 2018 21:42) (104/58 - 124/69)  BP(mean): --  RR: 18 (14 Sep 2018 22:09) (18 - 20)  SpO2: 100% (14 Sep 2018 22:09) (98% - 100%)    Gen: NAD  CV RRR S1S2  Pulm CTAB  Abdomen: Soft, appropriate post-op tenderness, non-distended, firm uterine fundus at umbilicus.  Incision: Clean, dry, and intact with staples, improved surrounding erythema 2/2 adhesive sensitivity  Pelvic: Normal lochia noted  Ext: No calf tenderness    LABS:                        9.0    12.29 )-----------( 200      ( 14 Sep 2018 06:40 )             26.8                         8.4    15.68 )-----------( 173      ( 13 Sep 2018 19:36 )             25.0       09-14-18 @ 06:40      138  |  103  |  36<H>  ----------------------------<  82  4.2   |  21<L>  |  3.60<H>    09-13-18 @ 19:36      140  |  104  |  34<H>  ----------------------------<  81  4.5   |  19<L>  |  3.65<H>    09-13-18 @ 03:50      138  |  106  |  27<H>  ----------------------------<  81  4.3   |  18<L>  |  3.69<H>    09-12-18 @ 18:15      138  |  106  |  25<H>  ----------------------------<  87  4.5   |  18<L>  |  3.39<H>        Ca    7.8<L>      14 Sep 2018 06:40  Ca    7.5<L>      13 Sep 2018 19:36  Ca    7.6<L>      13 Sep 2018 03:50  Ca    7.8<L>      12 Sep 2018 18:15  Phos  5.8<H>     09-13  Mg     2.2     09-13    TPro  5.7<L>  /  Alb  2.4<L>  /  TBili  0.5  /  DBili  x   /  AST  37<H>  /  ALT  11  /  AlkPhos  62  09-14-18 @ 06:40  TPro  5.2<L>  /  Alb  2.3<L>  /  TBili  0.6  /  DBili  x   /  AST  37<H>  /  ALT  9   /  AlkPhos  62  09-13-18 @ 19:36  TPro  4.5<L>  /  Alb  2.2<L>  /  TBili  0.5  /  DBili  x   /  AST  31  /  ALT  5   /  AlkPhos  51  09-13-18 @ 03:50

## 2018-09-15 NOTE — LACTATION INITIAL EVALUATION - LACTATION INTERVENTIONS
Information discussed with patient regarding initiating dual electric pump to increase and maintain breast milk supply.  Equipment placed in room.  Patient will notify staff when she is ready to possibly initiate pumping.  Questions answered.  Support given.

## 2018-09-15 NOTE — PROGRESS NOTE ADULT - PROBLEM SELECTOR PLAN 1
Neuro: oxycodone for pain control, avoid NSAIDs 2/2 ALANIS  Card: hemodynamically stable, cont to monitor BPs.  H/H stable.       -anemia: can consider Fe/Coalce/Vit C  Resp: saturating well on room air.   GI: Patient tolerating reg diet and passing flatus   : voiding spontaneously. UOP adequate.      -ALANIS: Cr improving today.  continue to trend.  continue fluids.   heme: anemia 2/2 blood loss now stable. start SQH for DVT ppx. scd/oob/amb for dvt ppx  dispo:  continued impatient care    FITO Puente PGY3

## 2018-09-15 NOTE — PROGRESS NOTE ADULT - ATTENDING COMMENTS
Patient seen and examined by me.    Only c/o is of skin blisters in area of adhesive tape, applying silver sulfadine.  Passing flatus, mild nausea this am--resolved.   Agree with above assessment and plan  Pt clinically improving  Continue pain management

## 2018-09-15 NOTE — PROGRESS NOTE ADULT - ASSESSMENT
37y  POD#5 s/p emergent  with EBL of 900 with 500cc vaginal PPH post-op.  On POD#1, patient underwent RTOR for intraabdominal hemorrhage with 1.5L evacuated and posterior uterus and hysterotomy oversewn, however hemostasis was unable to be obtained, likely 2/2 DIC and hysterectomy was performed. Total EBL was 7L. Total products given: 2PRBCpreop+7PRBC intraop+2PRBCpostop + 7FFP + 4Plt +4 cryo. She is now s/p SICU stay.  She is currently hemodynamically stable  without signs of further bleeding. DIC labs show resolution of coagulopathy.  She has resolving ALANIS likely 2/2 poor perfusion during hemorrhage. UOP has been excellent and Cr is now trending down. Electrolytes have been WNL.

## 2018-09-16 LAB
BACTERIA BLD CULT: SIGNIFICANT CHANGE UP
BACTERIA BLD CULT: SIGNIFICANT CHANGE UP
BUN SERPL-MCNC: 31 MG/DL — HIGH (ref 7–23)
CALCIUM SERPL-MCNC: 7.9 MG/DL — LOW (ref 8.4–10.5)
CHLORIDE SERPL-SCNC: 103 MMOL/L — SIGNIFICANT CHANGE UP (ref 98–107)
CO2 SERPL-SCNC: 25 MMOL/L — SIGNIFICANT CHANGE UP (ref 22–31)
CREAT SERPL-MCNC: 2.54 MG/DL — HIGH (ref 0.5–1.3)
GLUCOSE SERPL-MCNC: 101 MG/DL — HIGH (ref 70–99)
HCT VFR BLD CALC: 24 % — LOW (ref 34.5–45)
HGB BLD-MCNC: 8.1 G/DL — LOW (ref 11.5–15.5)
MCHC RBC-ENTMCNC: 30.3 PG — SIGNIFICANT CHANGE UP (ref 27–34)
MCHC RBC-ENTMCNC: 33.8 % — SIGNIFICANT CHANGE UP (ref 32–36)
MCV RBC AUTO: 89.9 FL — SIGNIFICANT CHANGE UP (ref 80–100)
NRBC # FLD: 0 — SIGNIFICANT CHANGE UP
PLATELET # BLD AUTO: 227 K/UL — SIGNIFICANT CHANGE UP (ref 150–400)
PMV BLD: 10.7 FL — SIGNIFICANT CHANGE UP (ref 7–13)
POTASSIUM SERPL-MCNC: 4.2 MMOL/L — SIGNIFICANT CHANGE UP (ref 3.5–5.3)
POTASSIUM SERPL-SCNC: 4.2 MMOL/L — SIGNIFICANT CHANGE UP (ref 3.5–5.3)
RBC # BLD: 2.67 M/UL — LOW (ref 3.8–5.2)
RBC # FLD: 14.5 % — SIGNIFICANT CHANGE UP (ref 10.3–14.5)
SODIUM SERPL-SCNC: 139 MMOL/L — SIGNIFICANT CHANGE UP (ref 135–145)
WBC # BLD: 8.01 K/UL — SIGNIFICANT CHANGE UP (ref 3.8–10.5)
WBC # FLD AUTO: 8.01 K/UL — SIGNIFICANT CHANGE UP (ref 3.8–10.5)

## 2018-09-16 RX ORDER — FERROUS SULFATE 325(65) MG
325 TABLET ORAL THREE TIMES A DAY
Qty: 0 | Refills: 0 | Status: DISCONTINUED | OUTPATIENT
Start: 2018-09-16 | End: 2018-09-18

## 2018-09-16 RX ORDER — DOCUSATE SODIUM 100 MG
100 CAPSULE ORAL THREE TIMES A DAY
Qty: 0 | Refills: 0 | Status: DISCONTINUED | OUTPATIENT
Start: 2018-09-16 | End: 2018-09-18

## 2018-09-16 RX ORDER — ASCORBIC ACID 60 MG
250 TABLET,CHEWABLE ORAL THREE TIMES A DAY
Qty: 0 | Refills: 0 | Status: DISCONTINUED | OUTPATIENT
Start: 2018-09-16 | End: 2018-09-18

## 2018-09-16 RX ADMIN — ZINC OXIDE 1 APPLICATION(S): 200 OINTMENT TOPICAL at 03:00

## 2018-09-16 RX ADMIN — Medication 1 APPLICATION(S): at 10:49

## 2018-09-16 RX ADMIN — OXYCODONE HYDROCHLORIDE 5 MILLIGRAM(S): 5 TABLET ORAL at 18:40

## 2018-09-16 RX ADMIN — SIMETHICONE 80 MILLIGRAM(S): 80 TABLET, CHEWABLE ORAL at 20:51

## 2018-09-16 RX ADMIN — OXYCODONE HYDROCHLORIDE 5 MILLIGRAM(S): 5 TABLET ORAL at 14:12

## 2018-09-16 RX ADMIN — OXYCODONE HYDROCHLORIDE 5 MILLIGRAM(S): 5 TABLET ORAL at 12:15

## 2018-09-16 RX ADMIN — ONDANSETRON 4 MILLIGRAM(S): 8 TABLET, FILM COATED ORAL at 06:14

## 2018-09-16 RX ADMIN — HEPARIN SODIUM 5000 UNIT(S): 5000 INJECTION INTRAVENOUS; SUBCUTANEOUS at 22:05

## 2018-09-16 RX ADMIN — Medication 100 MILLIGRAM(S): at 06:16

## 2018-09-16 RX ADMIN — SIMETHICONE 80 MILLIGRAM(S): 80 TABLET, CHEWABLE ORAL at 10:50

## 2018-09-16 RX ADMIN — Medication 100 MILLIGRAM(S): at 22:06

## 2018-09-16 RX ADMIN — Medication 325 MILLIGRAM(S): at 14:11

## 2018-09-16 RX ADMIN — HEPARIN SODIUM 5000 UNIT(S): 5000 INJECTION INTRAVENOUS; SUBCUTANEOUS at 06:14

## 2018-09-16 RX ADMIN — Medication 100 MILLIGRAM(S): at 14:12

## 2018-09-16 RX ADMIN — OXYCODONE HYDROCHLORIDE 5 MILLIGRAM(S): 5 TABLET ORAL at 10:50

## 2018-09-16 RX ADMIN — Medication 250 MILLIGRAM(S): at 14:11

## 2018-09-16 RX ADMIN — OXYCODONE HYDROCHLORIDE 5 MILLIGRAM(S): 5 TABLET ORAL at 06:16

## 2018-09-16 RX ADMIN — OXYCODONE HYDROCHLORIDE 5 MILLIGRAM(S): 5 TABLET ORAL at 02:09

## 2018-09-16 RX ADMIN — OXYCODONE HYDROCHLORIDE 5 MILLIGRAM(S): 5 TABLET ORAL at 23:00

## 2018-09-16 RX ADMIN — Medication 325 MILLIGRAM(S): at 06:15

## 2018-09-16 RX ADMIN — OXYCODONE HYDROCHLORIDE 5 MILLIGRAM(S): 5 TABLET ORAL at 19:30

## 2018-09-16 RX ADMIN — Medication 250 MILLIGRAM(S): at 06:16

## 2018-09-16 RX ADMIN — OXYCODONE HYDROCHLORIDE 5 MILLIGRAM(S): 5 TABLET ORAL at 22:00

## 2018-09-16 RX ADMIN — OXYCODONE HYDROCHLORIDE 5 MILLIGRAM(S): 5 TABLET ORAL at 02:39

## 2018-09-16 RX ADMIN — OXYCODONE HYDROCHLORIDE 5 MILLIGRAM(S): 5 TABLET ORAL at 06:46

## 2018-09-16 RX ADMIN — OXYCODONE HYDROCHLORIDE 5 MILLIGRAM(S): 5 TABLET ORAL at 15:15

## 2018-09-16 RX ADMIN — Medication 250 MILLIGRAM(S): at 22:05

## 2018-09-16 RX ADMIN — Medication 325 MILLIGRAM(S): at 22:06

## 2018-09-16 RX ADMIN — ONDANSETRON 4 MILLIGRAM(S): 8 TABLET, FILM COATED ORAL at 20:51

## 2018-09-16 RX ADMIN — HEPARIN SODIUM 5000 UNIT(S): 5000 INJECTION INTRAVENOUS; SUBCUTANEOUS at 14:11

## 2018-09-16 NOTE — PROGRESS NOTE ADULT - SUBJECTIVE AND OBJECTIVE BOX
OB Postpartum Note:  Delivery    S: Patient seen and examined at bedside.  No acute events overnight.  Patient has no complaints and pain is well-controlled on oral analgesia.  Patient is tolerating regular diet, passing flatus, ambulating, and is voiding spontaneously.  Postpartum bleeding is well controlled.     O:  Vitals:  Vital Signs Last 24 Hrs  T(C): 37.1 (15 Sep 2018 22:17), Max: 37.1 (15 Sep 2018 19:00)  T(F): 98.8 (15 Sep 2018 22:17), Max: 98.8 (15 Sep 2018 22:17)  HR: 68 (16 Sep 2018 02:00) (66 - 73)  BP: 115/64 (16 Sep 2018 02:00) (114/70 - 125/66)  BP(mean): --  RR: 18 (16 Sep 2018 02:00) (17 - 19)  SpO2: 99% (16 Sep 2018 02:00) (98% - 99%)    LABS:  Blood type: A Negative  Rubella IgG: RPR: Negative                          8.3<L>   8.85 >-----------< 216    ( 09-15 @ 06:20 )             25.4<L>                        9.0<L>   12.29<H> >-----------< 200    (  @ 06:40 )             26.8<L>                        8.4<L>   15.68<H> >-----------< 173    (  @ 19:36 )             25.0<L>    09-15-18 @ 06:36      137  |  103  |  35<H>  ----------------------------<  93  4.0   |  20<L>  |  3.04<H>    18 @ 06:40      138  |  103  |  36<H>  ----------------------------<  82  4.2   |  21<L>  |  3.60<H>    18 @ 19:36      140  |  104  |  34<H>  ----------------------------<  81  4.5   |  19<L>  |  3.65<H>        Ca    7.7<L>      15 Sep 2018 06:36  Ca    7.8<L>      14 Sep 2018 06:40  Ca    7.5<L>      13 Sep 2018 19:36    TPro  5.5<L>  /  Alb  2.3<L>  /  TBili  0.5  /  DBili  x   /  AST  21  /  ALT  7   /  AlkPhos  57  09-15-18 @ 06:36  TPro  5.7<L>  /  Alb  2.4<L>  /  TBili  0.5  /  DBili  x   /  AST  37<H>  /  ALT  11  /  AlkPhos  62  18 @ 06:40  TPro  5.2<L>  /  Alb  2.3<L>  /  TBili  0.6  /  DBili  x   /  AST  37<H>  /  ALT  9   /  AlkPhos  62  18 @ 19:36          Gen: NAD  CV RRR S1S2  Pulm CTAB  Abdomen: Soft, appropriate post-op tenderness, non-distended, firm uterine fundus at umbilicus.  Incision: Clean, dry, and intact with staples, improved surrounding erythema 2/2 adhesive sensitivity  Pelvic: Normal lochia noted  Ext: No calf tenderness

## 2018-09-16 NOTE — PROGRESS NOTE ADULT - ASSESSMENT
----- Message from Vilma Luevano sent at 4/5/2017  9:32 AM CDT -----  Contact: Foster care worker Ayde Stuart [phone]  Mom says the pt G-tube came out last night so the pt went to the hospital but it was too late so the hole closed. She says this is the second message she left this morning about this and says she needs to speak to someone right away. Ayde would also like to speak to someone about the swallow study. Please advise.   37y  POD#6 s/p emergent  with EBL of 900 with 500cc vaginal PPH post-op.  On POD#1, patient underwent RTOR for intraabdominal hemorrhage with 1.5L evacuated and posterior uterus and hysterotomy oversewn, however hemostasis was unable to be obtained, likely 2/2 DIC and hysterectomy was performed. Total EBL was 7L. Total products given: 2PRBCpreop+7PRBC intraop+2PRBCpostop + 7FFP + 4Plt +4 cryo. She is now s/p SICU stay.  She is currently hemodynamically stable  without signs of further bleeding. DIC labs show resolution of coagulopathy.  She has resolving ALANIS likely 2/2 poor perfusion during hemorrhage. UOP has been excellent and Cr is now trending down. Electrolytes have been WNL.

## 2018-09-16 NOTE — PROGRESS NOTE ADULT - PROBLEM SELECTOR PLAN 1
Neuro: oxycodone for pain control, avoid NSAIDs 2/2 ALANIS  Card: hemodynamically stable, cont to monitor BPs.  H/H stable.       -anemia: start Fe/Coalce/Vit C  Resp: saturating well on room air.   GI: Patient tolerating reg diet and passing flatus   : voiding spontaneously. UOP adequate.      -ALANIS: Cr improving today.  continue to trend.  continue PO intake.   heme: anemia 2/2 blood loss now stable. start SQH for DVT ppx. scd/oob/amb for dvt ppx  dispo:  continued impatient care    Kellie Tiffaniern PGY-3

## 2018-09-16 NOTE — PROGRESS NOTE ADULT - ATTENDING COMMENTS
Patient seen and examined by me. Agree with resident assessment and plan. Stable POD#5 from return to OR, hysterectomy. Hemodynamically stable. Continue postop care.

## 2018-09-17 LAB
BASOPHILS # BLD AUTO: 0.03 K/UL — SIGNIFICANT CHANGE UP (ref 0–0.2)
BASOPHILS NFR BLD AUTO: 0.4 % — SIGNIFICANT CHANGE UP (ref 0–2)
BUN SERPL-MCNC: 27 MG/DL — HIGH (ref 7–23)
CALCIUM SERPL-MCNC: 8.3 MG/DL — LOW (ref 8.4–10.5)
CHLORIDE SERPL-SCNC: 103 MMOL/L — SIGNIFICANT CHANGE UP (ref 98–107)
CO2 SERPL-SCNC: 24 MMOL/L — SIGNIFICANT CHANGE UP (ref 22–31)
CREAT SERPL-MCNC: 2.19 MG/DL — HIGH (ref 0.5–1.3)
EOSINOPHIL # BLD AUTO: 0.21 K/UL — SIGNIFICANT CHANGE UP (ref 0–0.5)
EOSINOPHIL NFR BLD AUTO: 2.7 % — SIGNIFICANT CHANGE UP (ref 0–6)
GLUCOSE SERPL-MCNC: 119 MG/DL — HIGH (ref 70–99)
HCT VFR BLD CALC: 25.9 % — LOW (ref 34.5–45)
HGB BLD-MCNC: 8.4 G/DL — LOW (ref 11.5–15.5)
IMM GRANULOCYTES # BLD AUTO: 0.05 # — SIGNIFICANT CHANGE UP
IMM GRANULOCYTES NFR BLD AUTO: 0.7 % — SIGNIFICANT CHANGE UP (ref 0–1.5)
LYMPHOCYTES # BLD AUTO: 1.38 K/UL — SIGNIFICANT CHANGE UP (ref 1–3.3)
LYMPHOCYTES # BLD AUTO: 18.1 % — SIGNIFICANT CHANGE UP (ref 13–44)
MCHC RBC-ENTMCNC: 29.4 PG — SIGNIFICANT CHANGE UP (ref 27–34)
MCHC RBC-ENTMCNC: 32.4 % — SIGNIFICANT CHANGE UP (ref 32–36)
MCV RBC AUTO: 90.6 FL — SIGNIFICANT CHANGE UP (ref 80–100)
MONOCYTES # BLD AUTO: 0.51 K/UL — SIGNIFICANT CHANGE UP (ref 0–0.9)
MONOCYTES NFR BLD AUTO: 6.7 % — SIGNIFICANT CHANGE UP (ref 2–14)
NEUTROPHILS # BLD AUTO: 5.46 K/UL — SIGNIFICANT CHANGE UP (ref 1.8–7.4)
NEUTROPHILS NFR BLD AUTO: 71.4 % — SIGNIFICANT CHANGE UP (ref 43–77)
NRBC # FLD: 0 — SIGNIFICANT CHANGE UP
PLATELET # BLD AUTO: 269 K/UL — SIGNIFICANT CHANGE UP (ref 150–400)
PMV BLD: 10.4 FL — SIGNIFICANT CHANGE UP (ref 7–13)
POTASSIUM SERPL-MCNC: 4.4 MMOL/L — SIGNIFICANT CHANGE UP (ref 3.5–5.3)
POTASSIUM SERPL-SCNC: 4.4 MMOL/L — SIGNIFICANT CHANGE UP (ref 3.5–5.3)
RBC # BLD: 2.86 M/UL — LOW (ref 3.8–5.2)
RBC # FLD: 14.3 % — SIGNIFICANT CHANGE UP (ref 10.3–14.5)
SODIUM SERPL-SCNC: 139 MMOL/L — SIGNIFICANT CHANGE UP (ref 135–145)
WBC # BLD: 7.64 K/UL — SIGNIFICANT CHANGE UP (ref 3.8–10.5)
WBC # FLD AUTO: 7.64 K/UL — SIGNIFICANT CHANGE UP (ref 3.8–10.5)

## 2018-09-17 PROCEDURE — 99231 SBSQ HOSP IP/OBS SF/LOW 25: CPT

## 2018-09-17 RX ADMIN — Medication 325 MILLIGRAM(S): at 22:56

## 2018-09-17 RX ADMIN — Medication 1 TABLET(S): at 11:10

## 2018-09-17 RX ADMIN — SIMETHICONE 80 MILLIGRAM(S): 80 TABLET, CHEWABLE ORAL at 02:00

## 2018-09-17 RX ADMIN — Medication 100 MILLIGRAM(S): at 06:09

## 2018-09-17 RX ADMIN — Medication 100 MILLIGRAM(S): at 22:56

## 2018-09-17 RX ADMIN — Medication 325 MILLIGRAM(S): at 15:16

## 2018-09-17 RX ADMIN — OXYCODONE HYDROCHLORIDE 5 MILLIGRAM(S): 5 TABLET ORAL at 02:00

## 2018-09-17 RX ADMIN — OXYCODONE HYDROCHLORIDE 5 MILLIGRAM(S): 5 TABLET ORAL at 12:00

## 2018-09-17 RX ADMIN — OXYCODONE HYDROCHLORIDE 5 MILLIGRAM(S): 5 TABLET ORAL at 11:10

## 2018-09-17 RX ADMIN — Medication 250 MILLIGRAM(S): at 15:16

## 2018-09-17 RX ADMIN — SIMETHICONE 80 MILLIGRAM(S): 80 TABLET, CHEWABLE ORAL at 15:21

## 2018-09-17 RX ADMIN — Medication 250 MILLIGRAM(S): at 06:07

## 2018-09-17 RX ADMIN — Medication 100 MILLIGRAM(S): at 15:16

## 2018-09-17 RX ADMIN — HEPARIN SODIUM 5000 UNIT(S): 5000 INJECTION INTRAVENOUS; SUBCUTANEOUS at 22:56

## 2018-09-17 RX ADMIN — OXYCODONE HYDROCHLORIDE 5 MILLIGRAM(S): 5 TABLET ORAL at 22:56

## 2018-09-17 RX ADMIN — SIMETHICONE 80 MILLIGRAM(S): 80 TABLET, CHEWABLE ORAL at 06:00

## 2018-09-17 RX ADMIN — Medication 325 MILLIGRAM(S): at 06:10

## 2018-09-17 RX ADMIN — ZINC OXIDE 1 APPLICATION(S): 200 OINTMENT TOPICAL at 16:39

## 2018-09-17 RX ADMIN — OXYCODONE HYDROCHLORIDE 5 MILLIGRAM(S): 5 TABLET ORAL at 19:00

## 2018-09-17 RX ADMIN — HEPARIN SODIUM 5000 UNIT(S): 5000 INJECTION INTRAVENOUS; SUBCUTANEOUS at 15:16

## 2018-09-17 RX ADMIN — Medication 250 MILLIGRAM(S): at 22:56

## 2018-09-17 RX ADMIN — OXYCODONE HYDROCHLORIDE 5 MILLIGRAM(S): 5 TABLET ORAL at 06:00

## 2018-09-17 RX ADMIN — ONDANSETRON 4 MILLIGRAM(S): 8 TABLET, FILM COATED ORAL at 06:00

## 2018-09-17 RX ADMIN — OXYCODONE HYDROCHLORIDE 5 MILLIGRAM(S): 5 TABLET ORAL at 18:16

## 2018-09-17 RX ADMIN — ZINC OXIDE 1 APPLICATION(S): 200 OINTMENT TOPICAL at 22:57

## 2018-09-17 RX ADMIN — SIMETHICONE 80 MILLIGRAM(S): 80 TABLET, CHEWABLE ORAL at 18:16

## 2018-09-17 RX ADMIN — HEPARIN SODIUM 5000 UNIT(S): 5000 INJECTION INTRAVENOUS; SUBCUTANEOUS at 06:00

## 2018-09-17 RX ADMIN — OXYCODONE HYDROCHLORIDE 5 MILLIGRAM(S): 5 TABLET ORAL at 23:00

## 2018-09-17 NOTE — PROGRESS NOTE BEHAVIORAL HEALTH - NSBHCHARTREVIEWVS_PSY_A_CORE FT
Vital Signs Last 24 Hrs  T(C): 37.1 (17 Sep 2018 14:42), Max: 37.3 (16 Sep 2018 22:32)  T(F): 98.8 (17 Sep 2018 14:42), Max: 99.1 (16 Sep 2018 22:32)  HR: 84 (17 Sep 2018 14:42) (66 - 84)  BP: 115/66 (17 Sep 2018 14:42) (105/65 - 125/68)  BP(mean): --  RR: 18 (17 Sep 2018 14:42) (16 - 18)  SpO2: 98% (17 Sep 2018 14:42) (98% - 100%)

## 2018-09-17 NOTE — PROGRESS NOTE BEHAVIORAL HEALTH - NSBHCONSULTFOLLOWAFTERCARE_PSY_A_CORE FT
Pt may f/u at the  psychiatry program at [facility]. She has numbers for the outpt program [phone] ([phone]).

## 2018-09-17 NOTE — PROGRESS NOTE BEHAVIORAL HEALTH - NSBHCHARTREVIEWLAB_PSY_A_CORE FT
8.4    7.64  )-----------( 269      ( 17 Sep 2018 06:52 )             25.9   09-17    139  |  103  |  27<H>  ----------------------------<  119<H>  4.4   |  24  |  2.19<H>    Ca    8.3<L>      17 Sep 2018 06:52

## 2018-09-17 NOTE — PROGRESS NOTE ADULT - PROBLEM SELECTOR PLAN 1
Neuro: Cont oxy, discussed trying to wean today with pt. She is allergic to tylenol and with ALANIS she cannot take NSAIDs.   CV: Hemodynamically stable  Pulm: Saturating well on RA. Increase incentive spirometry, out of bed, and ambulation as tolerated.  GI: Cont reg diet as tolerated.   : Voiding spontaneously. ALANIS 2/2 massive hemorrhage now improving, will continue to trend Cr.   Heme: Continue HSQ/Venodynes for DVT ppx. Increase OOB and ambulation. Hct noted to be stable.  Dispo: eval for DC today or tomorrow  Loyda Flores PGY3

## 2018-09-17 NOTE — PROGRESS NOTE ADULT - SUBJECTIVE AND OBJECTIVE BOX
OB Progress Note POD # 7    Subjective:     Pt seen and examined at bedside. No events overnight. Pain well controlled with oxycodone. Patient ambulating. Passing flatus. Tolerating regular diet. Pt denies fever, chills, chest pain, SOB, nausea, vomiting, lightheadedness, dizziness.  She does not feel ready to go home yet.     Objective:  T(F): 97.8 (09-17-18 @ 06:42), Max: 99.1 (09-16-18 @ 22:32)  HR: 71 (09-17-18 @ 06:42) (66 - 71)  BP: 118/61 (09-17-18 @ 06:42) (105/65 - 126/71)  RR: 18 (09-17-18 @ 06:42) (16 - 18)  SpO2: 99% (09-17-18 @ 06:42) (98% - 100%)    I&O's Summary    16 Sep 2018 07:01  -  17 Sep 2018 07:00  --------------------------------------------------------  IN: 0 mL / OUT: 5100 mL / NET: -5100 mL      CAPILLARY BLOOD GLUCOSE          MEDICATIONS  (STANDING):  ascorbic acid 250 milliGRAM(s) Oral three times a day  diphtheria/tetanus/pertussis (acellular) Vaccine (ADAcel) 0.5 milliLiter(s) IntraMuscular once  docusate sodium 100 milliGRAM(s) Oral three times a day  ferrous    sulfate 325 milliGRAM(s) Oral three times a day  heparin  Injectable 5000 Unit(s) SubCutaneous every 8 hours  influenza   Vaccine 0.5 milliLiter(s) IntraMuscular once  melatonin 6 milliGRAM(s) Oral at bedtime  prenatal multivitamin 1 Tablet(s) Oral daily  zinc oxide 20% Ointment 1 Application(s) Topical three times a day    MEDICATIONS  (PRN):  diphenhydrAMINE   Capsule 25 milliGRAM(s) Oral every 6 hours PRN Itching  diphenhydrAMINE   Capsule 25 milliGRAM(s) Oral at bedtime PRN Insomnia  docusate sodium 100 milliGRAM(s) Oral two times a day PRN Stool Softening  glycerin Suppository - Adult 1 Suppository(s) Rectal at bedtime PRN Constipation  HYDROmorphone  Injectable 0.5 milliGRAM(s) IV Push every 4 hours PRN Severe Pain (7 - 10)  lanolin Ointment 1 Application(s) Topical every 3 hours PRN Sore Nipples  naloxone Injectable 0.1 milliGRAM(s) IV Push every 3 minutes PRN For ANY of the following changes in patient status:  A. RR LESS THAN 10 breaths per minute, B. Oxygen saturation LESS THAN 90%, C. Sedation score of 6  ondansetron    Tablet 4 milliGRAM(s) Oral every 6 hours PRN Nausea and/or Vomiting  oxyCODONE    IR 5 milliGRAM(s) Oral every 3 hours PRN Mild Pain (1 - 3)  oxyCODONE    IR 10 milliGRAM(s) Oral every 3 hours PRN Moderate Pain (4 - 6)  silver sulfADIAZINE 1% Cream 1 Application(s) Topical three times a day PRN Wound Care  simethicone 80 milliGRAM(s) Chew two times a day PRN Gas  simethicone 80 milliGRAM(s) Chew every 4 hours PRN Gas      Physical Exam:  Constitutional: NAD, A+O x3  CV: RRR  Lungs: clear to auscultation bilaterally  Abdomen: softly distended, nontender   Incision: clean, dry, intact, staples removed  Extremities: no lower extremity edema or calf tenderness bilaterally, venodynes in place    Labs:                        8.4    7.64  )-----------( 269      ( 17 Sep 2018 06:52 )             25.9   baso 0.4    eos 2.7    imm gran 0.7    lymph 18.1   mono 6.7    poly 71.4                         8.1    8.01  )-----------( 227      ( 16 Sep 2018 06:30 )             24.0                           8.3    8.85  )-----------( 216      ( 15 Sep 2018 06:20 )             25.4        09-17    139    |  103    |  27<H>  ----------------------------<  119<H>  4.4     |  24     |  2.19<H>  09-16    139    |  103    |  31<H>  ----------------------------<  101<H>  4.2     |  25     |  2.54<H>    Ca    8.3<L>      17 Sep 2018 06:52  Ca    7.9<L>      16 Sep 2018 06:30

## 2018-09-17 NOTE — PROGRESS NOTE BEHAVIORAL HEALTH - NSBHFUPINTERVALHXFT_PSY_A_CORE
Pt and her  hope that she will be going home tomorrow and she plans to follow up at the  psychiatry program.  She says that she plans to see a therapist there next week because she does not want to delay for fear that her anxiety will worsen.  She appears calm and comfortable with no acute psychiatric symptoms.

## 2018-09-17 NOTE — PROGRESS NOTE BEHAVIORAL HEALTH - SUMMARY
Pt is a 37 yr old woman,  POD#4 s/p emergent  with return to the OR because of continuous intraabdominal hemorrhage and inability to achieve hemostasis because of DIC resulting in need for a hysterectomy and large quantity of blood products and IV fluids.  Pt developed ALANIS from poor perfusion secondary to the hemorrhage and DIC, now partially resolving.  She is very anxious and worried that she will not be able to perform all aspects of caring for her  baby, but is responsive to support and hopeful that she will recover both physically and mentally from her long complicated hospital stay.  She has no significant past psychiatric history and no past history of any suicidal ideation and no history of substance use. She wants to seek therapy and was very receptive to having outpatient  follow up at the  psychiatry program at [facility]. She has numbers for the outpt program [phone] (4mom).  She may benefit from Melatonin 6mg po qhs and Benadryl 25mg to 50mg po qhs prn insomnia.  Pt remains calm and comfortable with plans to f/u at the  psychiatry program at [facility] outpt department.  Pt has our numbers as well for any help with scheduling outpt follow up appointments.

## 2018-09-17 NOTE — PROGRESS NOTE ADULT - ASSESSMENT
Assessment/Plan: 37F  POD#7 s/p emergent  with EBL of 900 with 500cc vaginal PPH post-op.  On POD#1, patient underwent RTOR for intraabdominal hemorrhage with 1.5L evacuated and posterior uterus and hysterotomy oversewn, however hemostasis was unable to be obtained, likely 2/2 DIC and hysterectomy was performed. Total EBL was 7L. Total products given: 2PRBCpreop+7PRBC intraop+2PRBCpostop + 7FFP + 4Plt +4 cryo. She is now s/p SICU stay.  She is currently hemodynamically stable  without signs of further bleeding. DIC labs show resolution of coagulopathy.  She has resolving ALANIS likely 2/2 poor perfusion during hemorrhage. UOP has been excellent and Cr is now trending down. Electrolytes have been WNL. She has stable VS and is ambulating, lucinda reg diet, passing gas, and pain controlled.

## 2018-09-18 VITALS
OXYGEN SATURATION: 99 % | TEMPERATURE: 98 F | HEART RATE: 74 BPM | RESPIRATION RATE: 18 BRPM | DIASTOLIC BLOOD PRESSURE: 66 MMHG | SYSTOLIC BLOOD PRESSURE: 111 MMHG

## 2018-09-18 LAB
ALBUMIN SERPL ELPH-MCNC: 2.8 G/DL — LOW (ref 3.3–5)
ALP SERPL-CCNC: 58 U/L — SIGNIFICANT CHANGE UP (ref 40–120)
ALT FLD-CCNC: 19 U/L — SIGNIFICANT CHANGE UP (ref 4–33)
AST SERPL-CCNC: 32 U/L — SIGNIFICANT CHANGE UP (ref 4–32)
BILIRUB SERPL-MCNC: 0.6 MG/DL — SIGNIFICANT CHANGE UP (ref 0.2–1.2)
BUN SERPL-MCNC: 24 MG/DL — HIGH (ref 7–23)
CALCIUM SERPL-MCNC: 8.3 MG/DL — LOW (ref 8.4–10.5)
CHLORIDE SERPL-SCNC: 103 MMOL/L — SIGNIFICANT CHANGE UP (ref 98–107)
CO2 SERPL-SCNC: 23 MMOL/L — SIGNIFICANT CHANGE UP (ref 22–31)
CREAT SERPL-MCNC: 1.9 MG/DL — HIGH (ref 0.5–1.3)
GLUCOSE SERPL-MCNC: 94 MG/DL — SIGNIFICANT CHANGE UP (ref 70–99)
POTASSIUM SERPL-MCNC: 4.6 MMOL/L — SIGNIFICANT CHANGE UP (ref 3.5–5.3)
POTASSIUM SERPL-SCNC: 4.6 MMOL/L — SIGNIFICANT CHANGE UP (ref 3.5–5.3)
PROT SERPL-MCNC: 6.1 G/DL — SIGNIFICANT CHANGE UP (ref 6–8.3)
SODIUM SERPL-SCNC: 138 MMOL/L — SIGNIFICANT CHANGE UP (ref 135–145)

## 2018-09-18 RX ORDER — DOCUSATE SODIUM 100 MG
1 CAPSULE ORAL
Qty: 0 | Refills: 0 | COMMUNITY
Start: 2018-09-18

## 2018-09-18 RX ORDER — LANOLIN ALCOHOL/MO/W.PET/CERES
2 CREAM (GRAM) TOPICAL
Qty: 0 | Refills: 0 | DISCHARGE
Start: 2018-09-18

## 2018-09-18 RX ORDER — FERROUS SULFATE 325(65) MG
1 TABLET ORAL
Qty: 30 | Refills: 0 | OUTPATIENT
Start: 2018-09-18 | End: 2018-10-17

## 2018-09-18 RX ORDER — ASCORBIC ACID 60 MG
1 TABLET,CHEWABLE ORAL
Qty: 0 | Refills: 0 | COMMUNITY
Start: 2018-09-18

## 2018-09-18 RX ORDER — ONDANSETRON 8 MG/1
1 TABLET, FILM COATED ORAL
Qty: 16 | Refills: 0 | OUTPATIENT
Start: 2018-09-18 | End: 2018-09-21

## 2018-09-18 RX ADMIN — HEPARIN SODIUM 5000 UNIT(S): 5000 INJECTION INTRAVENOUS; SUBCUTANEOUS at 05:59

## 2018-09-18 RX ADMIN — SIMETHICONE 80 MILLIGRAM(S): 80 TABLET, CHEWABLE ORAL at 05:59

## 2018-09-18 RX ADMIN — ZINC OXIDE 1 APPLICATION(S): 200 OINTMENT TOPICAL at 06:00

## 2018-09-18 RX ADMIN — OXYCODONE HYDROCHLORIDE 5 MILLIGRAM(S): 5 TABLET ORAL at 05:58

## 2018-09-18 RX ADMIN — Medication 325 MILLIGRAM(S): at 06:00

## 2018-09-18 RX ADMIN — Medication 100 MILLIGRAM(S): at 05:59

## 2018-09-18 RX ADMIN — OXYCODONE HYDROCHLORIDE 5 MILLIGRAM(S): 5 TABLET ORAL at 06:28

## 2018-09-18 RX ADMIN — ONDANSETRON 4 MILLIGRAM(S): 8 TABLET, FILM COATED ORAL at 05:57

## 2018-09-18 RX ADMIN — OXYCODONE HYDROCHLORIDE 5 MILLIGRAM(S): 5 TABLET ORAL at 12:44

## 2018-09-18 RX ADMIN — OXYCODONE HYDROCHLORIDE 5 MILLIGRAM(S): 5 TABLET ORAL at 13:27

## 2018-09-18 RX ADMIN — Medication 250 MILLIGRAM(S): at 05:58

## 2018-09-18 NOTE — PROGRESS NOTE ADULT - SUBJECTIVE AND OBJECTIVE BOX
R3 Ante POD#8    Subjective:     Pt seen and examined at bedside. No events overnight. Pain well controlled. Patient ambulating. Passing flatus. Tolerating regular diet. Pt denies fever, chills, chest pain, SOB, nausea, vomiting, lightheadedness, dizziness.      Objective:  T(F): 98.9 (09-18-18 @ 06:00), Max: 99.3 (09-17-18 @ 22:00)  HR: 68 (09-18-18 @ 06:00) (68 - 88)  BP: 109/58 (09-18-18 @ 06:00) (109/58 - 124/77)  RR: 18 (09-18-18 @ 06:00) (18 - 18)  SpO2: 100% (09-18-18 @ 06:00) (98% - 100%)    I&O's Summary    16 Sep 2018 07:01  -  17 Sep 2018 07:00  --------------------------------------------------------  IN: 0 mL / OUT: 5100 mL / NET: -5100 mL    17 Sep 2018 07:01  -  18 Sep 2018 06:52  --------------------------------------------------------  IN: 0 mL / OUT: 1500 mL / NET: -1500 mL            MEDICATIONS  (STANDING):  ascorbic acid 250 milliGRAM(s) Oral three times a day  diphtheria/tetanus/pertussis (acellular) Vaccine (ADAcel) 0.5 milliLiter(s) IntraMuscular once  docusate sodium 100 milliGRAM(s) Oral three times a day  ferrous    sulfate 325 milliGRAM(s) Oral three times a day  heparin  Injectable 5000 Unit(s) SubCutaneous every 8 hours  influenza   Vaccine 0.5 milliLiter(s) IntraMuscular once  melatonin 6 milliGRAM(s) Oral at bedtime  prenatal multivitamin 1 Tablet(s) Oral daily  zinc oxide 20% Ointment 1 Application(s) Topical three times a day    MEDICATIONS  (PRN):  diphenhydrAMINE   Capsule 25 milliGRAM(s) Oral every 6 hours PRN Itching  diphenhydrAMINE   Capsule 25 milliGRAM(s) Oral at bedtime PRN Insomnia  docusate sodium 100 milliGRAM(s) Oral two times a day PRN Stool Softening  glycerin Suppository - Adult 1 Suppository(s) Rectal at bedtime PRN Constipation  HYDROmorphone  Injectable 0.5 milliGRAM(s) IV Push every 4 hours PRN Severe Pain (7 - 10)  lanolin Ointment 1 Application(s) Topical every 3 hours PRN Sore Nipples  naloxone Injectable 0.1 milliGRAM(s) IV Push every 3 minutes PRN For ANY of the following changes in patient status:  A. RR LESS THAN 10 breaths per minute, B. Oxygen saturation LESS THAN 90%, C. Sedation score of 6  ondansetron    Tablet 4 milliGRAM(s) Oral every 6 hours PRN Nausea and/or Vomiting  oxyCODONE    IR 5 milliGRAM(s) Oral every 3 hours PRN Mild Pain (1 - 3)  oxyCODONE    IR 10 milliGRAM(s) Oral every 3 hours PRN Moderate Pain (4 - 6)  silver sulfADIAZINE 1% Cream 1 Application(s) Topical three times a day PRN Wound Care  simethicone 80 milliGRAM(s) Chew two times a day PRN Gas  simethicone 80 milliGRAM(s) Chew every 4 hours PRN Gas      Physical Exam:  Constitutional: NAD, A+O x3  CV: RRR  Lungs: clear to auscultation bilaterally  Abdomen: appropriately tender, softly distended, no rebound or guarding  Incision: clean, dry, intact, with healing blisters from adhesive bandage  Extremities: no lower extremity edema or calf tenderness bilaterally, venodynes in place    Labs:                        8.4    7.64  )-----------( 269      ( 17 Sep 2018 06:52 )             25.9   baso 0.4    eos 2.7    imm gran 0.7    lymph 18.1   mono 6.7    poly 71.4                         8.1    8.01  )-----------( 227      ( 16 Sep 2018 06:30 )             24.0   baso x      eos x      imm gran x      lymph x      mono x      poly x        09-18    138    |  103    |  24<H>  ----------------------------<  94     4.6     |  23     |  1.90<H>  09-17    139    |  103    |  27<H>  ----------------------------<  119<H>  4.4     |  24     |  2.19<H>    Ca    8.3<L>      18 Sep 2018 05:42  Ca    8.3<L>      17 Sep 2018 06:52    TPro  6.1    /  Alb  2.8<L>  /  TBili  0.6    /  DBili  x      /  AST  x      /  ALT  19     /  AlkPhos  58     09-18

## 2018-09-18 NOTE — PROGRESS NOTE ADULT - PROBLEM SELECTOR PLAN 1
Neuro: Continue oral meds for pain control. Pt taking oxycodone as she is allergic to tylenol and cannot take NSAIDs with ALANIS but has been spacing out the oxycodone as pain control is improving.   CV: Hemodynamically stable  Pulm: Saturating well on RA. Increase incentive spirometry, out of bed, and ambulation as tolerated.  GI: Reg diet  : Voiding adequately and spontaneously. Cr trending down, now 1.9 (high was 3.7).   Heme: Continue HSQ/Venodynes for DVT ppx. Increase OOB and ambulation. Hct stable without further signs of bleeding. Fe/VitC/colace.   SW following  Dispo: eval for DC  Loyda Flores PGY3

## 2018-09-18 NOTE — PROGRESS NOTE ADULT - ASSESSMENT
Assessment/Plan: 37F  POD#8 s/p emergent  with EBL of 900 with 500cc vaginal PPH post-op.  On POD#1, patient underwent RTOR for intraabdominal hemorrhage with 1.5L evacuated and posterior uterus and hysterotomy oversewn, however hemostasis was unable to be obtained, likely 2/2 DIC and hysterectomy was performed. Total EBL was 7L. Total products given: 2PRBCpreop+7PRBC intraop+2PRBCpostop + 7FFP + 4Plt +4 cryo. She is now s/p SICU stay.  She is currently hemodynamically stable  without signs of further bleeding. DIC labs show resolution of coagulopathy.  She has resolving ALANIS likely 2/2 poor perfusion during hemorrhage. UOP has been excellent and Cr is now trending down. Electrolytes have been WNL. Social work has been following and the patient already has follow up scheduled with therapist. She has stable VS and is ambulating, lucinda reg diet, passing gas, and pain controlled.

## 2018-09-20 RX ADMIN — Medication 400 MILLIGRAM(S): at 20:40

## 2018-09-24 ENCOUNTER — APPOINTMENT (OUTPATIENT)
Dept: OBGYN | Facility: CLINIC | Age: 38
End: 2018-09-24
Payer: COMMERCIAL

## 2018-09-24 ENCOUNTER — LABORATORY RESULT (OUTPATIENT)
Age: 38
End: 2018-09-24

## 2018-09-24 VITALS
TEMPERATURE: 100.6 F | HEIGHT: 67 IN | HEART RATE: 98 BPM | SYSTOLIC BLOOD PRESSURE: 124 MMHG | DIASTOLIC BLOOD PRESSURE: 82 MMHG | WEIGHT: 182.3 LBS | BODY MASS INDEX: 28.61 KG/M2

## 2018-09-24 DIAGNOSIS — O09.521 SUPERVISION OF ELDERLY MULTIGRAVIDA, FIRST TRIMESTER: ICD-10-CM

## 2018-09-24 DIAGNOSIS — O36.80X0 PREGNANCY WITH INCONCLUSIVE FETAL VIABILITY, NOT APPLICABLE OR UNSPECIFIED: ICD-10-CM

## 2018-09-24 DIAGNOSIS — O47.9 FALSE LABOR, UNSPECIFIED: ICD-10-CM

## 2018-09-24 DIAGNOSIS — O09.522 SUPERVISION OF ELDERLY MULTIGRAVIDA, SECOND TRIMESTER: ICD-10-CM

## 2018-09-24 DIAGNOSIS — O09.513 SUPERVISION OF ELDERLY PRIMIGRAVIDA, THIRD TRIMESTER: ICD-10-CM

## 2018-09-24 DIAGNOSIS — Z34.03 ENCOUNTER FOR SUPERVISION OF NORMAL FIRST PREGNANCY, THIRD TRIMESTER: ICD-10-CM

## 2018-09-24 DIAGNOSIS — Z78.9 OTHER SPECIFIED HEALTH STATUS: ICD-10-CM

## 2018-09-24 PROCEDURE — 0503F POSTPARTUM CARE VISIT: CPT

## 2018-09-24 RX ORDER — OXYCODONE 5 MG/1
5 TABLET ORAL
Qty: 8 | Refills: 0 | Status: COMPLETED | COMMUNITY
Start: 2018-09-13

## 2018-09-25 ENCOUNTER — OTHER (OUTPATIENT)
Age: 38
End: 2018-09-25

## 2018-09-25 ENCOUNTER — APPOINTMENT (OUTPATIENT)
Dept: OBGYN | Facility: CLINIC | Age: 38
End: 2018-09-25
Payer: COMMERCIAL

## 2018-09-25 ENCOUNTER — RESULT CHARGE (OUTPATIENT)
Age: 38
End: 2018-09-25

## 2018-09-25 VITALS
DIASTOLIC BLOOD PRESSURE: 78 MMHG | HEIGHT: 67 IN | TEMPERATURE: 98.5 F | SYSTOLIC BLOOD PRESSURE: 116 MMHG | HEART RATE: 90 BPM

## 2018-09-25 DIAGNOSIS — D72.829 ELEVATED WHITE BLOOD CELL COUNT, UNSPECIFIED: ICD-10-CM

## 2018-09-25 LAB
ALBUMIN SERPL ELPH-MCNC: 3.6 G/DL
ALP BLD-CCNC: 69 U/L
ALT SERPL-CCNC: 20 U/L
ANION GAP SERPL CALC-SCNC: 16 MMOL/L
AST SERPL-CCNC: 21 U/L
BASOPHILS # BLD AUTO: 0.04 K/UL
BASOPHILS NFR BLD AUTO: 0.3 %
BILIRUB SERPL-MCNC: 0.5 MG/DL
BUN SERPL-MCNC: 15 MG/DL
CALCIUM SERPL-MCNC: 8.6 MG/DL
CHLORIDE SERPL-SCNC: 100 MMOL/L
CO2 SERPL-SCNC: 22 MMOL/L
CREAT SERPL-MCNC: 1.05 MG/DL
EOSINOPHIL # BLD AUTO: 0.06 K/UL
EOSINOPHIL NFR BLD AUTO: 0.4 %
GLUCOSE SERPL-MCNC: 69 MG/DL
HCT VFR BLD CALC: 27.9 %
HGB BLD-MCNC: 9 G/DL
IMM GRANULOCYTES NFR BLD AUTO: 0.3 %
LYMPHOCYTES # BLD AUTO: 1.4 K/UL
LYMPHOCYTES NFR BLD AUTO: 9.1 %
MAN DIFF?: NORMAL
MCHC RBC-ENTMCNC: 29.1 PG
MCHC RBC-ENTMCNC: 32.3 GM/DL
MCV RBC AUTO: 90.3 FL
MONOCYTES # BLD AUTO: 1.05 K/UL
MONOCYTES NFR BLD AUTO: 6.8 %
NEUTROPHILS # BLD AUTO: 12.82 K/UL
NEUTROPHILS NFR BLD AUTO: 83.1 %
PLATELET # BLD AUTO: 578 K/UL
POTASSIUM SERPL-SCNC: 4.4 MMOL/L
PROT SERPL-MCNC: 7.1 G/DL
RBC # BLD: 3.09 M/UL
RBC # FLD: 14.4 %
SODIUM SERPL-SCNC: 138 MMOL/L
WBC # FLD AUTO: 15.42 K/UL

## 2018-09-25 PROCEDURE — 99211 OFF/OP EST MAY X REQ PHY/QHP: CPT

## 2018-09-26 ENCOUNTER — MESSAGE (OUTPATIENT)
Age: 38
End: 2018-09-26

## 2018-09-26 LAB
BILIRUB UR QL STRIP: NEGATIVE
CLARITY UR: CLEAR
COLLECTION METHOD: NORMAL
GLUCOSE UR-MCNC: NEGATIVE
HCG UR QL: 0.2 EU/DL
HGB UR QL STRIP.AUTO: NORMAL
KETONES UR-MCNC: NEGATIVE
LEUKOCYTE ESTERASE UR QL STRIP: NORMAL
NITRITE UR QL STRIP: NEGATIVE
PH UR STRIP: 5.5
PROT UR STRIP-MCNC: NEGATIVE
SP GR UR STRIP: 1.01

## 2018-09-27 ENCOUNTER — APPOINTMENT (OUTPATIENT)
Dept: CT IMAGING | Facility: CLINIC | Age: 38
End: 2018-09-27
Payer: COMMERCIAL

## 2018-09-27 ENCOUNTER — OUTPATIENT (OUTPATIENT)
Dept: OUTPATIENT SERVICES | Facility: HOSPITAL | Age: 38
LOS: 1 days | Discharge: HOME | End: 2018-09-27

## 2018-09-27 ENCOUNTER — APPOINTMENT (OUTPATIENT)
Dept: OBGYN | Facility: CLINIC | Age: 38
End: 2018-09-27
Payer: COMMERCIAL

## 2018-09-27 ENCOUNTER — INPATIENT (INPATIENT)
Facility: HOSPITAL | Age: 38
LOS: 4 days | Discharge: HOME | End: 2018-10-02
Attending: OBSTETRICS & GYNECOLOGY | Admitting: OBSTETRICS & GYNECOLOGY
Payer: COMMERCIAL

## 2018-09-27 ENCOUNTER — OUTPATIENT (OUTPATIENT)
Dept: OUTPATIENT SERVICES | Facility: HOSPITAL | Age: 38
LOS: 1 days | End: 2018-09-27
Payer: COMMERCIAL

## 2018-09-27 VITALS
BODY MASS INDEX: 28.56 KG/M2 | WEIGHT: 182 LBS | HEART RATE: 121 BPM | SYSTOLIC BLOOD PRESSURE: 125 MMHG | TEMPERATURE: 100.7 F | DIASTOLIC BLOOD PRESSURE: 81 MMHG | HEIGHT: 67 IN

## 2018-09-27 VITALS
HEART RATE: 110 BPM | SYSTOLIC BLOOD PRESSURE: 117 MMHG | TEMPERATURE: 101 F | OXYGEN SATURATION: 100 % | DIASTOLIC BLOOD PRESSURE: 71 MMHG | RESPIRATION RATE: 18 BRPM

## 2018-09-27 DIAGNOSIS — M67.922 UNSPECIFIED DISORDER OF SYNOVIUM AND TENDON, LEFT UPPER ARM: Chronic | ICD-10-CM

## 2018-09-27 DIAGNOSIS — L02.91 CUTANEOUS ABSCESS, UNSPECIFIED: ICD-10-CM

## 2018-09-27 DIAGNOSIS — Z00.8 ENCOUNTER FOR OTHER GENERAL EXAMINATION: ICD-10-CM

## 2018-09-27 DIAGNOSIS — T81.4XXA INFECTION FOLLOWING A PROCEDURE, INITIAL ENCOUNTER: ICD-10-CM

## 2018-09-27 DIAGNOSIS — Z90.711 ACQUIRED ABSENCE OF UTERUS WITH REMAINING CERVICAL STUMP: Chronic | ICD-10-CM

## 2018-09-27 LAB
ALBUMIN SERPL ELPH-MCNC: 3.2 G/DL — LOW (ref 3.3–5)
ALP SERPL-CCNC: 79 U/L — SIGNIFICANT CHANGE UP (ref 40–120)
ALT FLD-CCNC: 21 U/L — SIGNIFICANT CHANGE UP (ref 4–33)
APPEARANCE UR: CLEAR — SIGNIFICANT CHANGE UP
APTT BLD: 29.9 SEC — SIGNIFICANT CHANGE UP (ref 27.5–37.4)
AST SERPL-CCNC: 23 U/L — SIGNIFICANT CHANGE UP (ref 4–32)
BASOPHILS # BLD AUTO: 0.07 K/UL — SIGNIFICANT CHANGE UP (ref 0–0.2)
BASOPHILS NFR BLD AUTO: 0.5 % — SIGNIFICANT CHANGE UP (ref 0–2)
BILIRUB SERPL-MCNC: 0.5 MG/DL — SIGNIFICANT CHANGE UP (ref 0.2–1.2)
BILIRUB UR-MCNC: NEGATIVE — SIGNIFICANT CHANGE UP
BLD GP AB SCN SERPL QL: NEGATIVE — SIGNIFICANT CHANGE UP
BLOOD UR QL VISUAL: NEGATIVE — SIGNIFICANT CHANGE UP
BUN SERPL-MCNC: 9 MG/DL — SIGNIFICANT CHANGE UP (ref 7–23)
CALCIUM SERPL-MCNC: 8.6 MG/DL — SIGNIFICANT CHANGE UP (ref 8.4–10.5)
CHLORIDE SERPL-SCNC: 100 MMOL/L — SIGNIFICANT CHANGE UP (ref 98–107)
CO2 SERPL-SCNC: 20 MMOL/L — LOW (ref 22–31)
COLOR SPEC: COLORLESS — SIGNIFICANT CHANGE UP
CREAT SERPL-MCNC: 0.94 MG/DL — SIGNIFICANT CHANGE UP (ref 0.5–1.3)
EOSINOPHIL # BLD AUTO: 0.02 K/UL — SIGNIFICANT CHANGE UP (ref 0–0.5)
EOSINOPHIL NFR BLD AUTO: 0.2 % — SIGNIFICANT CHANGE UP (ref 0–6)
FIBRINOGEN PPP-MCNC: 902 MG/DL — HIGH (ref 310–510)
GLUCOSE SERPL-MCNC: 116 MG/DL — HIGH (ref 70–99)
GLUCOSE UR-MCNC: NEGATIVE — SIGNIFICANT CHANGE UP
HCT VFR BLD CALC: 24.2 % — LOW (ref 34.5–45)
HGB BLD-MCNC: 7.9 G/DL — LOW (ref 11.5–15.5)
IMM GRANULOCYTES # BLD AUTO: 0.05 # — SIGNIFICANT CHANGE UP
IMM GRANULOCYTES NFR BLD AUTO: 0.4 % — SIGNIFICANT CHANGE UP (ref 0–1.5)
INR BLD: 1.32 — HIGH (ref 0.88–1.17)
KETONES UR-MCNC: NEGATIVE — SIGNIFICANT CHANGE UP
LACTATE SERPL-SCNC: 1.1 MMOL/L — SIGNIFICANT CHANGE UP (ref 0.5–2)
LEUKOCYTE ESTERASE UR-ACNC: SIGNIFICANT CHANGE UP
LYMPHOCYTES # BLD AUTO: 1.58 K/UL — SIGNIFICANT CHANGE UP (ref 1–3.3)
LYMPHOCYTES # BLD AUTO: 12.3 % — LOW (ref 13–44)
MCHC RBC-ENTMCNC: 29.2 PG — SIGNIFICANT CHANGE UP (ref 27–34)
MCHC RBC-ENTMCNC: 32.6 % — SIGNIFICANT CHANGE UP (ref 32–36)
MCV RBC AUTO: 89.3 FL — SIGNIFICANT CHANGE UP (ref 80–100)
MONOCYTES # BLD AUTO: 0.89 K/UL — SIGNIFICANT CHANGE UP (ref 0–0.9)
MONOCYTES NFR BLD AUTO: 6.9 % — SIGNIFICANT CHANGE UP (ref 2–14)
NEUTROPHILS # BLD AUTO: 10.24 K/UL — HIGH (ref 1.8–7.4)
NEUTROPHILS NFR BLD AUTO: 79.7 % — HIGH (ref 43–77)
NITRITE UR-MCNC: NEGATIVE — SIGNIFICANT CHANGE UP
NRBC # FLD: 0 — SIGNIFICANT CHANGE UP
PH UR: 6.5 — SIGNIFICANT CHANGE UP (ref 5–8)
PLATELET # BLD AUTO: 515 K/UL — HIGH (ref 150–400)
PMV BLD: 11 FL — SIGNIFICANT CHANGE UP (ref 7–13)
POTASSIUM SERPL-MCNC: 3.8 MMOL/L — SIGNIFICANT CHANGE UP (ref 3.5–5.3)
POTASSIUM SERPL-SCNC: 3.8 MMOL/L — SIGNIFICANT CHANGE UP (ref 3.5–5.3)
PROT SERPL-MCNC: 7.7 G/DL — SIGNIFICANT CHANGE UP (ref 6–8.3)
PROT UR-MCNC: NEGATIVE — SIGNIFICANT CHANGE UP
PROTHROM AB SERPL-ACNC: 14.7 SEC — HIGH (ref 9.8–13.1)
RBC # BLD: 2.71 M/UL — LOW (ref 3.8–5.2)
RBC # FLD: 13.3 % — SIGNIFICANT CHANGE UP (ref 10.3–14.5)
RH IG SCN BLD-IMP: NEGATIVE — SIGNIFICANT CHANGE UP
SODIUM SERPL-SCNC: 135 MMOL/L — SIGNIFICANT CHANGE UP (ref 135–145)
SP GR SPEC: 1 — LOW (ref 1–1.04)
UROBILINOGEN FLD QL: NORMAL — SIGNIFICANT CHANGE UP
WBC # BLD: 12.85 K/UL — HIGH (ref 3.8–10.5)
WBC # FLD AUTO: 12.85 K/UL — HIGH (ref 3.8–10.5)

## 2018-09-27 PROCEDURE — 99024 POSTOP FOLLOW-UP VISIT: CPT

## 2018-09-27 PROCEDURE — 74176 CT ABD & PELVIS W/O CONTRAST: CPT | Mod: 26

## 2018-09-27 PROCEDURE — 74176 CT ABD & PELVIS W/O CONTRAST: CPT

## 2018-09-27 RX ORDER — SODIUM CHLORIDE 9 MG/ML
1000 INJECTION, SOLUTION INTRAVENOUS
Qty: 0 | Refills: 0 | Status: DISCONTINUED | OUTPATIENT
Start: 2018-09-27 | End: 2018-10-02

## 2018-09-27 RX ORDER — GENTAMICIN SULFATE 40 MG/ML
80 VIAL (ML) INJECTION EVERY 8 HOURS
Qty: 0 | Refills: 0 | Status: DISCONTINUED | OUTPATIENT
Start: 2018-09-28 | End: 2018-10-01

## 2018-09-27 RX ORDER — GENTAMICIN SULFATE 40 MG/ML
120 VIAL (ML) INJECTION ONCE
Qty: 0 | Refills: 0 | Status: COMPLETED | OUTPATIENT
Start: 2018-09-27 | End: 2018-09-27

## 2018-09-27 RX ORDER — GENTAMICIN SULFATE 40 MG/ML
VIAL (ML) INJECTION
Qty: 0 | Refills: 0 | Status: DISCONTINUED | OUTPATIENT
Start: 2018-09-27 | End: 2018-10-01

## 2018-09-27 RX ORDER — INFLUENZA VIRUS VACCINE 15; 15; 15; 15 UG/.5ML; UG/.5ML; UG/.5ML; UG/.5ML
0.5 SUSPENSION INTRAMUSCULAR ONCE
Qty: 0 | Refills: 0 | Status: DISCONTINUED | OUTPATIENT
Start: 2018-09-27 | End: 2018-10-02

## 2018-09-27 RX ADMIN — Medication 200 MILLIGRAM(S): at 22:30

## 2018-09-27 RX ADMIN — Medication 100 MILLIGRAM(S): at 22:00

## 2018-09-27 NOTE — H&P ADULT - PROBLEM SELECTOR PLAN 1
-CBC, CMP, Coags, Lacatate  -Gentamycin and Clindamycin  -Reg diet, NPO at midnight  -IR consult for possible drainage, CBC and coags in the morning  -IVF: LR@125  -Ambulation and SCDs for DVT ppx    FITO Puente PGY3

## 2018-09-27 NOTE — H&P ADULT - HISTORY OF PRESENT ILLNESS
38 yo  POD#17 s/p pLTCS for NRFHT and POD#16 s/p hysterectomy for uncontrolled bleeding in the setting of DIC sent in for admission from office after CT showed pelvic fluid collection.  Patient began experiencing low grade fevers at home 3 days ago.  A urine culture showed contamination but patient was treated for presumed UTI.  Today, patient presented to the office with continued fevers and a CT abdomen and pelvis was performed revealing pelvic fluid collection.  Patient denies pain, nausea, vomiting, diarrhea, and constipation.  Patient complains of slight irritation with urination.

## 2018-09-27 NOTE — H&P ADULT - PSH
delivery delivered    S/P abdominal supracervical subtotal hysterectomy    Tendinopathy of left biceps tendon

## 2018-09-27 NOTE — H&P ADULT - ASSESSMENT
38 yo  POD#17 s/p pLTCS for NRFHT and POD#16 s/p hysterectomy admitted for pelvic fluid collection and sepsis.  Patient is hemodynamically stable and asymptomatic.

## 2018-09-27 NOTE — H&P ADULT - NSHPPHYSICALEXAM_GEN_ALL_CORE
Vital Signs Last 24 Hrs  T(C): 38.4 (27 Sep 2018 18:36), Max: 38.4 (27 Sep 2018 18:36)  T(F): 101.1 (27 Sep 2018 18:36), Max: 101.1 (27 Sep 2018 18:36)  HR: 110 (27 Sep 2018 18:36) (110 - 110)  BP: 117/71 (27 Sep 2018 18:36) (117/71 - 117/71)  BP(mean): --  RR: 18 (27 Sep 2018 18:36) (18 - 18)  SpO2: 100% (27 Sep 2018 18:36) (100% - 100%)    PHYSICAL EXAM:      Constitutional: alert and oriented x 3  Respiratory: clear  Cardiovascular: regular rate and rhythm  Gastrointestinal: soft, non tender, + bowel sounds. No hepatosplenomegaly, no palpable masses

## 2018-09-28 ENCOUNTER — TRANSCRIPTION ENCOUNTER (OUTPATIENT)
Age: 38
End: 2018-09-28

## 2018-09-28 DIAGNOSIS — F43.0 ACUTE STRESS REACTION: ICD-10-CM

## 2018-09-28 DIAGNOSIS — T81.4XXA INFECTION FOLLOWING A PROCEDURE, INITIAL ENCOUNTER: ICD-10-CM

## 2018-09-28 LAB
APTT BLD: 31.5 SEC — SIGNIFICANT CHANGE UP (ref 27.5–37.4)
BASOPHILS # BLD AUTO: 0.07 K/UL — SIGNIFICANT CHANGE UP (ref 0–0.2)
BASOPHILS NFR BLD AUTO: 0.7 % — SIGNIFICANT CHANGE UP (ref 0–2)
EOSINOPHIL # BLD AUTO: 0.04 K/UL — SIGNIFICANT CHANGE UP (ref 0–0.5)
EOSINOPHIL NFR BLD AUTO: 0.4 % — SIGNIFICANT CHANGE UP (ref 0–6)
GRAM STN WND: SIGNIFICANT CHANGE UP
HCT VFR BLD CALC: 24.6 % — LOW (ref 34.5–45)
HGB BLD-MCNC: 7.9 G/DL — LOW (ref 11.5–15.5)
IMM GRANULOCYTES # BLD AUTO: 0.05 # — SIGNIFICANT CHANGE UP
IMM GRANULOCYTES NFR BLD AUTO: 0.5 % — SIGNIFICANT CHANGE UP (ref 0–1.5)
INR BLD: 1.37 — HIGH (ref 0.88–1.17)
LYMPHOCYTES # BLD AUTO: 1.43 K/UL — SIGNIFICANT CHANGE UP (ref 1–3.3)
LYMPHOCYTES # BLD AUTO: 14.2 % — SIGNIFICANT CHANGE UP (ref 13–44)
MCHC RBC-ENTMCNC: 28.4 PG — SIGNIFICANT CHANGE UP (ref 27–34)
MCHC RBC-ENTMCNC: 32.1 % — SIGNIFICANT CHANGE UP (ref 32–36)
MCV RBC AUTO: 88.5 FL — SIGNIFICANT CHANGE UP (ref 80–100)
MONOCYTES # BLD AUTO: 0.91 K/UL — HIGH (ref 0–0.9)
MONOCYTES NFR BLD AUTO: 9 % — SIGNIFICANT CHANGE UP (ref 2–14)
NEUTROPHILS # BLD AUTO: 7.58 K/UL — HIGH (ref 1.8–7.4)
NEUTROPHILS NFR BLD AUTO: 75.2 % — SIGNIFICANT CHANGE UP (ref 43–77)
NRBC # FLD: 0 — SIGNIFICANT CHANGE UP
PLATELET # BLD AUTO: 521 K/UL — HIGH (ref 150–400)
PMV BLD: 10.7 FL — SIGNIFICANT CHANGE UP (ref 7–13)
PROTHROM AB SERPL-ACNC: 15.8 SEC — HIGH (ref 9.8–13.1)
RBC # BLD: 2.78 M/UL — LOW (ref 3.8–5.2)
RBC # FLD: 13.3 % — SIGNIFICANT CHANGE UP (ref 10.3–14.5)
SPECIMEN SOURCE: SIGNIFICANT CHANGE UP
WBC # BLD: 10.08 K/UL — SIGNIFICANT CHANGE UP (ref 3.8–10.5)
WBC # FLD AUTO: 10.08 K/UL — SIGNIFICANT CHANGE UP (ref 3.8–10.5)

## 2018-09-28 PROCEDURE — 49406 IMAGE CATH FLUID PERI/RETRO: CPT

## 2018-09-28 RX ORDER — HEPARIN SODIUM 5000 [USP'U]/ML
5000 INJECTION INTRAVENOUS; SUBCUTANEOUS EVERY 12 HOURS
Qty: 0 | Refills: 0 | Status: DISCONTINUED | OUTPATIENT
Start: 2018-09-28 | End: 2018-10-02

## 2018-09-28 RX ORDER — IBUPROFEN 200 MG
400 TABLET ORAL EVERY 6 HOURS
Qty: 0 | Refills: 0 | Status: DISCONTINUED | OUTPATIENT
Start: 2018-09-28 | End: 2018-10-02

## 2018-09-28 RX ADMIN — HEPARIN SODIUM 5000 UNIT(S): 5000 INJECTION INTRAVENOUS; SUBCUTANEOUS at 21:50

## 2018-09-28 RX ADMIN — Medication 100 MILLIGRAM(S): at 06:45

## 2018-09-28 RX ADMIN — Medication 100 MILLIGRAM(S): at 21:50

## 2018-09-28 RX ADMIN — Medication 100 MILLIGRAM(S): at 22:40

## 2018-09-28 RX ADMIN — Medication 100 MILLIGRAM(S): at 13:04

## 2018-09-28 RX ADMIN — Medication 400 MILLIGRAM(S): at 20:49

## 2018-09-28 RX ADMIN — Medication 400 MILLIGRAM(S): at 21:45

## 2018-09-28 RX ADMIN — Medication 100 MILLIGRAM(S): at 13:43

## 2018-09-28 RX ADMIN — Medication 100 MILLIGRAM(S): at 06:00

## 2018-09-28 NOTE — DISCHARGE NOTE ADULT - CARE PROVIDER_API CALL
Christen Callejas (MD), Obstetrics and Gynecology  [facility] [place] [address]  Phone: [phone]  Fax: [phone]

## 2018-09-28 NOTE — DISCHARGE NOTE ADULT - MEDICATION SUMMARY - MEDICATIONS TO STOP TAKING
I will STOP taking the medications listed below when I get home from the hospital:    oxyCODONE 5 mg oral tablet  -- 1 tab(s) by mouth every 6 hours MDD:4    Zofran ODT 4 mg oral tablet, disintegrating  -- 1 tab(s) by mouth every 6 hours, As Needed

## 2018-09-28 NOTE — PROGRESS NOTE ADULT - PROBLEM SELECTOR PLAN 1
Neuro: Pt denies pain at this time.   CV: Hemodynamically stable  Pulm: Saturating well on RA. Increase incentive spirometry, out of bed, and ambulation as tolerated.  GI: NPO until IR drainage, then can resume regular diet.   : Voiding spontaneously.   Heme: Continue Venodynes for DVT ppx. Increase OOB and ambulation. Will start SQH after IR drainage.   ID: Cont gent/clinda. WBC 10 this AM, will continue to trend. F/u urine, blood cultures.   IR consult for drainage this AM.  Loyda Flores PGY3

## 2018-09-28 NOTE — DISCHARGE NOTE ADULT - HOME CARE AGENCY
[facility](daily drain care)   [phone], initial visit will be next day after discharge home, a nurse will call to arrange home visit

## 2018-09-28 NOTE — DISCHARGE NOTE ADULT - PLAN OF CARE
return to normal state of health Continue taking ____ABX_____. Follow up with ID on ______. Follow up with Dr. Callejas in 1-2 weeks. Notify your doctor with further pain or fevers. Continue taking Cipro for 1 week. Follow up with Dr. Callejas in 1-2 weeks. Notify your doctor with further pain or fevers.

## 2018-09-28 NOTE — DISCHARGE NOTE ADULT - CARE PLAN
Principal Discharge DX:	Postoperative abscess, initial encounter  Goal:	return to normal state of health  Assessment and plan of treatment:	Continue taking ____ABX_____. Follow up with ID on ______. Follow up with Dr. Callejas in 1-2 weeks. Notify your doctor with further pain or fevers. Principal Discharge DX:	Postoperative abscess, initial encounter  Goal:	return to normal state of health  Assessment and plan of treatment:	Continue taking Cipro for 1 week. Follow up with Dr. Callejas in 1-2 weeks. Notify your doctor with further pain or fevers.

## 2018-09-28 NOTE — DISCHARGE NOTE ADULT - PATIENT PORTAL LINK FT
You can access the "[email or web address] Patient Portal, offered by [facility], by registering with the following website: [email or web address]

## 2018-09-28 NOTE — DISCHARGE NOTE ADULT - MEDICATION SUMMARY - MEDICATIONS TO TAKE
I will START or STAY ON the medications listed below when I get home from the hospital:    ibuprofen 400 mg oral tablet  -- 1 tab(s) by mouth every 6 hours, As needed, Mild Pain (1 - 3)  -- Indication: For Pain as needed     Prenatabs Rx oral tablet  -- 1 tab(s) by mouth once a day  -- Indication: For vitamin    melatonin 3 mg oral tablet  -- 2 tab(s) by mouth once a day (at bedtime)  -- Indication: For Sleep aid    ciprofloxacin 500 mg oral tablet  -- 1 tab(s) by mouth every 12 hours  -- Indication: For antibiotic

## 2018-09-28 NOTE — DISCHARGE NOTE ADULT - HOSPITAL COURSE
Pt admitted on 9/27 with fevers 2 weeks post op. CT scan showed 10cm pelvic collection, hematoma vs. abscess. On 9/28 she underwent IR drainage with 120cc of serosanguinous fluid drained. Pt admitted on 9/27 with fevers 2 weeks post op. CT scan showed 10cm pelvic collection, hematoma vs. abscess. She was admitted and given gent/clinda with good response and had no further fevers. On 9/28 she underwent IR drainage with 120cc of serosanguinous fluid drained. IR cultures did not grow out any organisms. On 10/1 repeat CT pelvis showed resolution of collection. The drain was removed and she was discharged home in stable condition. Pt admitted on 9/27 with fevers 2 weeks post op. CT scan showed 10cm pelvic collection, hematoma vs. abscess. She was admitted and given gent/clinda with good response and had no further fevers. On 9/28 she underwent IR drainage with 120cc of serosanguinous fluid drained. IR cultures did not grow out any organisms. On 10/1 repeat CT pelvis showed interval decrease of collection however tube study showed drain not in position. Pt admitted on 9/27 with fevers 2 weeks post op. CT scan showed 10cm pelvic collection, hematoma vs. abscess. She was admitted and given gent/clinda with good response and had no further fevers. On 9/28 she underwent IR drainage with 120cc of serosanguinous fluid drained. IR cultures did not grow out any organisms. On 10/1 repeat CT pelvis showed interval decrease of collection and she was clinically improved. Decision made by IR on 10/2 to remove tube. She was discharged home on oral antibiotics with follow up with Dr. Callejas.

## 2018-09-28 NOTE — CHART NOTE - NSCHARTNOTEFT_GEN_A_CORE
Pre-Interventional Radiology Procedure Note    37y    Female    Procedure:    Diagnosis/Indication: Patient is a 37y old  Female who presents with a chief complaint of Post-op pelvic fluid collection (28 Sep 2018 07:16)      Interventional Radiology Attending Physician: Yuan    Ordering Attending Physician: Crow Em    PAST MEDICAL & SURGICAL HISTORY:  Vasovagal syncope  Supraventricular dysrhythmia  S/P abdominal supracervical subtotal hysterectomy   delivery delivered  Tendinopathy of left biceps tendon       CBC Full  -  ( 28 Sep 2018 05:05 )  WBC Count : 10.08 K/uL  Hemoglobin : 7.9 g/dL  Hematocrit : 24.6 %  Platelet Count - Automated : 521 K/uL  Mean Cell Volume : 88.5 fL  Mean Cell Hemoglobin : 28.4 pg  Mean Cell Hemoglobin Concentration : 32.1 %  Auto Neutrophil # : 7.58 K/uL  Auto Lymphocyte # : 1.43 K/uL  Auto Monocyte # : 0.91 K/uL  Auto Eosinophil # : 0.04 K/uL  Auto Basophil # : 0.07 K/uL  Auto Neutrophil % : 75.2 %  Auto Lymphocyte % : 14.2 %  Auto Monocyte % : 9.0 %  Auto Eosinophil % : 0.4 %  Auto Basophil % : 0.7 %        135  |  100  |  9   ----------------------------<  116<H>  3.8   |  20<L>  |  0.94    Ca    8.6      27 Sep 2018 22:22    TPro  7.7  /  Alb  3.2<L>  /  TBili  0.5  /  DBili  x   /  AST  23  /  ALT  21  /  AlkPhos  79      PT/INR - ( 28 Sep 2018 05:05 )   PT: 15.8 SEC;   INR: 1.37          PTT - ( 28 Sep 2018 05:05 )  PTT:31.5 SEC    37 P1 POD#18, HD#2 from [facility] now with post op fevers and pelvic collection. She has been NPO and has normal fibrinogen, normal PTT, and INR of 1.37  -on gent/clinda  -for IR drainage of pelvic abscess today    Loyda Flores PGY3 [id number] Pre-Interventional Radiology Procedure Note    37y    Female    Procedure: IR drainage of pelvic abscess    Diagnosis/Indication: Patient is a 37y old  Female who presents with a chief complaint of Post-op pelvic fluid collection (28 Sep 2018 07:16)      Interventional Radiology Attending Physician: Yuan    Ordering Attending Physician: Crow Em    PAST MEDICAL & SURGICAL HISTORY:  Vasovagal syncope  Supraventricular dysrhythmia  S/P abdominal supracervical subtotal hysterectomy   delivery delivered  Tendinopathy of left biceps tendon       CBC Full  -  ( 28 Sep 2018 05:05 )  WBC Count : 10.08 K/uL  Hemoglobin : 7.9 g/dL  Hematocrit : 24.6 %  Platelet Count - Automated : 521 K/uL  Mean Cell Volume : 88.5 fL  Mean Cell Hemoglobin : 28.4 pg  Mean Cell Hemoglobin Concentration : 32.1 %  Auto Neutrophil # : 7.58 K/uL  Auto Lymphocyte # : 1.43 K/uL  Auto Monocyte # : 0.91 K/uL  Auto Eosinophil # : 0.04 K/uL  Auto Basophil # : 0.07 K/uL  Auto Neutrophil % : 75.2 %  Auto Lymphocyte % : 14.2 %  Auto Monocyte % : 9.0 %  Auto Eosinophil % : 0.4 %  Auto Basophil % : 0.7 %        135  |  100  |  9   ----------------------------<  116<H>  3.8   |  20<L>  |  0.94    Ca    8.6      27 Sep 2018 22:22    TPro  7.7  /  Alb  3.2<L>  /  TBili  0.5  /  DBili  x   /  AST  23  /  ALT  21  /  AlkPhos  79  09-    PT/INR - ( 28 Sep 2018 05:05 )   PT: 15.8 SEC;   INR: 1.37          PTT - ( 28 Sep 2018 05:05 )  PTT:31.5 SEC    37 P1 POD#18, HD#2 from [facility] now with post op fevers and pelvic collection. She has been NPO and has normal fibrinogen, normal PTT, and INR of 1.37  -on gent/clinda  -for IR drainage of pelvic abscess today    Loyda Flores PGY3 [id number]

## 2018-09-29 LAB
APTT BLD: 28.1 SEC — SIGNIFICANT CHANGE UP (ref 27.5–37.4)
BACTERIA UR CULT: SIGNIFICANT CHANGE UP
BASOPHILS # BLD AUTO: 0.07 K/UL — SIGNIFICANT CHANGE UP (ref 0–0.2)
BASOPHILS NFR BLD AUTO: 0.9 % — SIGNIFICANT CHANGE UP (ref 0–2)
EOSINOPHIL # BLD AUTO: 0.07 K/UL — SIGNIFICANT CHANGE UP (ref 0–0.5)
EOSINOPHIL NFR BLD AUTO: 0.9 % — SIGNIFICANT CHANGE UP (ref 0–6)
HCT VFR BLD CALC: 24 % — LOW (ref 34.5–45)
HGB BLD-MCNC: 7.8 G/DL — LOW (ref 11.5–15.5)
IMM GRANULOCYTES # BLD AUTO: 0.03 # — SIGNIFICANT CHANGE UP
IMM GRANULOCYTES NFR BLD AUTO: 0.4 % — SIGNIFICANT CHANGE UP (ref 0–1.5)
INR BLD: 1.33 — HIGH (ref 0.88–1.17)
LYMPHOCYTES # BLD AUTO: 1.34 K/UL — SIGNIFICANT CHANGE UP (ref 1–3.3)
LYMPHOCYTES # BLD AUTO: 17.7 % — SIGNIFICANT CHANGE UP (ref 13–44)
MCHC RBC-ENTMCNC: 29.2 PG — SIGNIFICANT CHANGE UP (ref 27–34)
MCHC RBC-ENTMCNC: 32.5 % — SIGNIFICANT CHANGE UP (ref 32–36)
MCV RBC AUTO: 89.9 FL — SIGNIFICANT CHANGE UP (ref 80–100)
MONOCYTES # BLD AUTO: 0.57 K/UL — SIGNIFICANT CHANGE UP (ref 0–0.9)
MONOCYTES NFR BLD AUTO: 7.5 % — SIGNIFICANT CHANGE UP (ref 2–14)
NEUTROPHILS # BLD AUTO: 5.47 K/UL — SIGNIFICANT CHANGE UP (ref 1.8–7.4)
NEUTROPHILS NFR BLD AUTO: 72.6 % — SIGNIFICANT CHANGE UP (ref 43–77)
NRBC # FLD: 0 — SIGNIFICANT CHANGE UP
PLATELET # BLD AUTO: 474 K/UL — HIGH (ref 150–400)
PMV BLD: 10.8 FL — SIGNIFICANT CHANGE UP (ref 7–13)
PROTHROM AB SERPL-ACNC: 14.8 SEC — HIGH (ref 9.8–13.1)
RBC # BLD: 2.67 M/UL — LOW (ref 3.8–5.2)
RBC # FLD: 13.7 % — SIGNIFICANT CHANGE UP (ref 10.3–14.5)
SPECIMEN SOURCE: SIGNIFICANT CHANGE UP
WBC # BLD: 7.55 K/UL — SIGNIFICANT CHANGE UP (ref 3.8–10.5)
WBC # FLD AUTO: 7.55 K/UL — SIGNIFICANT CHANGE UP (ref 3.8–10.5)

## 2018-09-29 RX ADMIN — Medication 400 MILLIGRAM(S): at 14:00

## 2018-09-29 RX ADMIN — Medication 100 MILLIGRAM(S): at 23:01

## 2018-09-29 RX ADMIN — Medication 400 MILLIGRAM(S): at 12:59

## 2018-09-29 RX ADMIN — Medication 100 MILLIGRAM(S): at 22:12

## 2018-09-29 RX ADMIN — Medication 400 MILLIGRAM(S): at 06:15

## 2018-09-29 RX ADMIN — Medication 100 MILLIGRAM(S): at 06:14

## 2018-09-29 RX ADMIN — Medication 100 MILLIGRAM(S): at 05:37

## 2018-09-29 RX ADMIN — Medication 100 MILLIGRAM(S): at 14:27

## 2018-09-29 RX ADMIN — HEPARIN SODIUM 5000 UNIT(S): 5000 INJECTION INTRAVENOUS; SUBCUTANEOUS at 22:14

## 2018-09-29 RX ADMIN — Medication 100 MILLIGRAM(S): at 14:30

## 2018-09-29 RX ADMIN — HEPARIN SODIUM 5000 UNIT(S): 5000 INJECTION INTRAVENOUS; SUBCUTANEOUS at 11:20

## 2018-09-29 RX ADMIN — Medication 400 MILLIGRAM(S): at 20:10

## 2018-09-29 RX ADMIN — Medication 400 MILLIGRAM(S): at 08:20

## 2018-09-29 NOTE — PROGRESS NOTE ADULT - PROBLEM SELECTOR PLAN 1
-c/w Gent/Clinda IV, pending IR culture speciation  -f/u repeat CBC  -Monitor for fevers, pain, elevated WBC  -analgesia as needed  -Reg diet  -ambulation and HSQ for DVT ppx    FITO Puente PGY3

## 2018-09-30 RX ADMIN — HEPARIN SODIUM 5000 UNIT(S): 5000 INJECTION INTRAVENOUS; SUBCUTANEOUS at 21:32

## 2018-09-30 RX ADMIN — Medication 100 MILLIGRAM(S): at 21:32

## 2018-09-30 RX ADMIN — Medication 100 MILLIGRAM(S): at 13:32

## 2018-09-30 RX ADMIN — Medication 400 MILLIGRAM(S): at 13:38

## 2018-09-30 RX ADMIN — Medication 100 MILLIGRAM(S): at 21:40

## 2018-09-30 RX ADMIN — Medication 100 MILLIGRAM(S): at 15:11

## 2018-09-30 RX ADMIN — Medication 400 MILLIGRAM(S): at 20:45

## 2018-09-30 RX ADMIN — Medication 400 MILLIGRAM(S): at 14:15

## 2018-09-30 RX ADMIN — Medication 100 MILLIGRAM(S): at 06:40

## 2018-09-30 RX ADMIN — Medication 400 MILLIGRAM(S): at 19:59

## 2018-09-30 RX ADMIN — Medication 100 MILLIGRAM(S): at 06:09

## 2018-09-30 RX ADMIN — HEPARIN SODIUM 5000 UNIT(S): 5000 INJECTION INTRAVENOUS; SUBCUTANEOUS at 10:44

## 2018-09-30 RX ADMIN — Medication 400 MILLIGRAM(S): at 07:45

## 2018-09-30 RX ADMIN — Medication 400 MILLIGRAM(S): at 07:07

## 2018-09-30 NOTE — PROGRESS NOTE ADULT - PROBLEM SELECTOR PLAN 1
-c/w Gent/Clinda IV, pending IR culture speciation  -f/u AM CBC  -Monitor for fevers, pain, elevated WBC  -analgesia as needed  -Reg diet  -ambulation and HSQ for DVT ppx  [email or web address]

## 2018-10-01 LAB — BACTERIA UR CULT: NORMAL

## 2018-10-01 PROCEDURE — 76080 X-RAY EXAM OF FISTULA: CPT | Mod: 26

## 2018-10-01 PROCEDURE — 72192 CT PELVIS W/O DYE: CPT | Mod: 26

## 2018-10-01 PROCEDURE — 49424 ASSESS CYST CONTRAST INJECT: CPT

## 2018-10-01 RX ORDER — SODIUM CHLORIDE 9 MG/ML
1000 INJECTION, SOLUTION INTRAVENOUS
Qty: 0 | Refills: 0 | Status: DISCONTINUED | OUTPATIENT
Start: 2018-10-02 | End: 2018-10-02

## 2018-10-01 RX ORDER — IBUPROFEN 200 MG
1 TABLET ORAL
Qty: 0 | Refills: 0 | DISCHARGE
Start: 2018-10-01

## 2018-10-01 RX ORDER — CIPROFLOXACIN LACTATE 400MG/40ML
500 VIAL (ML) INTRAVENOUS EVERY 12 HOURS
Qty: 0 | Refills: 0 | Status: DISCONTINUED | OUTPATIENT
Start: 2018-10-01 | End: 2018-10-02

## 2018-10-01 RX ORDER — CIPROFLOXACIN LACTATE 400MG/40ML
1 VIAL (ML) INTRAVENOUS
Qty: 14 | Refills: 0
Start: 2018-10-01 | End: 2018-10-07

## 2018-10-01 RX ADMIN — Medication 500 MILLIGRAM(S): at 19:13

## 2018-10-01 RX ADMIN — Medication 400 MILLIGRAM(S): at 14:06

## 2018-10-01 RX ADMIN — Medication 400 MILLIGRAM(S): at 06:15

## 2018-10-01 RX ADMIN — SODIUM CHLORIDE 125 MILLILITER(S): 9 INJECTION, SOLUTION INTRAVENOUS at 18:27

## 2018-10-01 RX ADMIN — HEPARIN SODIUM 5000 UNIT(S): 5000 INJECTION INTRAVENOUS; SUBCUTANEOUS at 18:38

## 2018-10-01 RX ADMIN — Medication 100 MILLIGRAM(S): at 05:40

## 2018-10-01 RX ADMIN — Medication 400 MILLIGRAM(S): at 05:40

## 2018-10-01 RX ADMIN — Medication 100 MILLIGRAM(S): at 05:44

## 2018-10-01 NOTE — PROGRESS NOTE ADULT - PROBLEM SELECTOR PLAN 1
Neuro: PO analgesia   CV: Hemodynamically stable  Pulm: Saturating well on RA. Increase incentive spirometry, out of bed, and ambulation as tolerated.  GI: Reg diet  : Voiding adequately   Heme: Continue HSQ/Venodynes for DVT ppx. Increase OOB and ambulation.   Loyda Flores PGY3 Neuro: PO analgesia   CV: Hemodynamically stable  Pulm: Saturating well on RA. Increase incentive spirometry, out of bed, and ambulation as tolerated.  GI: Reg diet  : Voiding adequately   Heme: Continue HSQ/Venodynes for DVT ppx. Increase OOB and ambulation.   Abscess: s/p drainage, IR drainage cultures not growing anything. On gent/clinda, will transition to oral antibioitcs.   Loyda Flores PGY3

## 2018-10-02 VITALS
OXYGEN SATURATION: 99 % | SYSTOLIC BLOOD PRESSURE: 108 MMHG | TEMPERATURE: 98 F | HEART RATE: 90 BPM | RESPIRATION RATE: 17 BRPM | DIASTOLIC BLOOD PRESSURE: 68 MMHG

## 2018-10-02 LAB
APTT BLD: 28.5 SEC — SIGNIFICANT CHANGE UP (ref 27.5–37.4)
BACTERIA BLD CULT: SIGNIFICANT CHANGE UP
BACTERIA BLD CULT: SIGNIFICANT CHANGE UP
BASOPHILS # BLD AUTO: 0.05 K/UL — SIGNIFICANT CHANGE UP (ref 0–0.2)
BASOPHILS NFR BLD AUTO: 0.7 % — SIGNIFICANT CHANGE UP (ref 0–2)
BLD GP AB SCN SERPL QL: NEGATIVE — SIGNIFICANT CHANGE UP
EOSINOPHIL # BLD AUTO: 0.09 K/UL — SIGNIFICANT CHANGE UP (ref 0–0.5)
EOSINOPHIL NFR BLD AUTO: 1.3 % — SIGNIFICANT CHANGE UP (ref 0–6)
HCT VFR BLD CALC: 24.4 % — LOW (ref 34.5–45)
HGB BLD-MCNC: 7.6 G/DL — LOW (ref 11.5–15.5)
IMM GRANULOCYTES # BLD AUTO: 0.03 # — SIGNIFICANT CHANGE UP
IMM GRANULOCYTES NFR BLD AUTO: 0.4 % — SIGNIFICANT CHANGE UP (ref 0–1.5)
INR BLD: 1.07 — SIGNIFICANT CHANGE UP (ref 0.88–1.17)
LYMPHOCYTES # BLD AUTO: 1.46 K/UL — SIGNIFICANT CHANGE UP (ref 1–3.3)
LYMPHOCYTES # BLD AUTO: 20.5 % — SIGNIFICANT CHANGE UP (ref 13–44)
MCHC RBC-ENTMCNC: 28.1 PG — SIGNIFICANT CHANGE UP (ref 27–34)
MCHC RBC-ENTMCNC: 31.1 % — LOW (ref 32–36)
MCV RBC AUTO: 90.4 FL — SIGNIFICANT CHANGE UP (ref 80–100)
MONOCYTES # BLD AUTO: 0.37 K/UL — SIGNIFICANT CHANGE UP (ref 0–0.9)
MONOCYTES NFR BLD AUTO: 5.2 % — SIGNIFICANT CHANGE UP (ref 2–14)
NEUTROPHILS # BLD AUTO: 5.11 K/UL — SIGNIFICANT CHANGE UP (ref 1.8–7.4)
NEUTROPHILS NFR BLD AUTO: 71.9 % — SIGNIFICANT CHANGE UP (ref 43–77)
NRBC # FLD: 0 — SIGNIFICANT CHANGE UP
PLATELET # BLD AUTO: 483 K/UL — HIGH (ref 150–400)
PMV BLD: 10.5 FL — SIGNIFICANT CHANGE UP (ref 7–13)
PROTHROM AB SERPL-ACNC: 12.3 SEC — SIGNIFICANT CHANGE UP (ref 9.8–13.1)
RBC # BLD: 2.7 M/UL — LOW (ref 3.8–5.2)
RBC # FLD: 13.8 % — SIGNIFICANT CHANGE UP (ref 10.3–14.5)
RH IG SCN BLD-IMP: NEGATIVE — SIGNIFICANT CHANGE UP
SPECIMEN SOURCE: SIGNIFICANT CHANGE UP
WBC # BLD: 7.11 K/UL — SIGNIFICANT CHANGE UP (ref 3.8–10.5)
WBC # FLD AUTO: 7.11 K/UL — SIGNIFICANT CHANGE UP (ref 3.8–10.5)

## 2018-10-02 PROCEDURE — 49424 ASSESS CYST CONTRAST INJECT: CPT

## 2018-10-02 PROCEDURE — 76080 X-RAY EXAM OF FISTULA: CPT | Mod: 26

## 2018-10-02 RX ADMIN — Medication 500 MILLIGRAM(S): at 05:33

## 2018-10-02 RX ADMIN — SODIUM CHLORIDE 125 MILLILITER(S): 9 INJECTION, SOLUTION INTRAVENOUS at 14:13

## 2018-10-02 NOTE — PROGRESS NOTE ADULT - REASON FOR ADMISSION
Post-op pelvic fluid collection

## 2018-10-02 NOTE — PROGRESS NOTE ADULT - ASSESSMENT
36 yo  HD#4 of readmission for pelvic collection s/p pLTCS c/b hysterectomy for hemoperitoneum now s/p IR drainage.  Patient is s/p IR drainage of collection.  She is afebrile and hemodynamically stable with pain well-controlled.
Assessment/Plan: 37y female POD# 21, HD#5 a/w fevers and pelvic collection s/p IR drainage. Afebrile since admission with normal WBC. Will call IR today regarding tube study for possible removal. Transition to oral antibiotics.
Assessment/Plan: 37y female POD# 22, HD#6 a/w fevers and pelvic collection s/p IR drainage. Afebrile since admission with normal WBC and stable on oral antibiotics. IR was consulted yesterday for drain removal but due to persistent collection they will adjust and/or replace the drain today. Pt desires to go home after IR for outpt follow up for drain removal.
Assessment/Plan: 37y female POD#18, HD#2, admitted with fluid collection in pelvis hematoma vs abscess. She underwent  for NRFHT on 9/10 and was taken back to OR on POD#1 with intrabdominal bleeding and DIC. Ultimately she lost 7L of blood, underwent MTP, and had a hysterectomy due to inability to obtain hemostasis. She recovered in the SICU for 2 days and was then transferred to the floor. She had stable anemia and ALANIS with Cr max of 3.7. Her postop course was uncomplicated and she was discharged home in stable condition. At home she developed fevers and was seen in the office yesterday with T 100.7. She was sent for outpatient CT scan which showed 10cm pelvic collection. She was admitted last night with the plan for IV antibiotics and IR drainage today.
38 yo  HD#3 of readmission for pelvic collection s/p pLTCS c/b hysterectomy for hemoperitoneum now s/p IR drainage.  Patient is s/p IR drainage of collection.  She is afebrile and hemodynamically stable with pain well-controlled.

## 2018-10-02 NOTE — CHART NOTE - NSCHARTNOTEFT_GEN_A_CORE
Pre-Interventional Radiology Procedure Note    37y    Female    Procedure: pelvic collection drainage    Diagnosis/Indication: Patient is a 37y old  Female who presents with a chief complaint of Post-op pelvic fluid collection (02 Oct 2018 07:28)      Interventional Radiology Attending Physician:Yuan    Ordering Attending Physician: Kg    PAST MEDICAL & SURGICAL HISTORY:  Vasovagal syncope  Supraventricular dysrhythmia  S/P abdominal supracervical subtotal hysterectomy   delivery delivered  Tendinopathy of left biceps tendon       CBC Full  -  ( 02 Oct 2018 05:45 )  WBC Count : 7.11 K/uL  Hemoglobin : 7.6 g/dL  Hematocrit : 24.4 %  Platelet Count - Automated : 483 K/uL  Mean Cell Volume : 90.4 fL  Mean Cell Hemoglobin : 28.1 pg  Mean Cell Hemoglobin Concentration : 31.1 %  Auto Neutrophil # : 5.11 K/uL  Auto Lymphocyte # : 1.46 K/uL  Auto Monocyte # : 0.37 K/uL  Auto Eosinophil # : 0.09 K/uL  Auto Basophil # : 0.05 K/uL  Auto Neutrophil % : 71.9 %  Auto Lymphocyte % : 20.5 %  Auto Monocyte % : 5.2 %  Auto Eosinophil % : 1.3 %  Auto Basophil % : 0.7 %          PT/INR - ( 02 Oct 2018 05:45 )   PT: 12.3 SEC;   INR: 1.07          PTT - ( 02 Oct 2018 05:45 )  PTT:28.5 SEC

## 2018-10-02 NOTE — PROGRESS NOTE ADULT - PROBLEM SELECTOR PLAN 1
Neuro: Continue oral meds for pain control  CV: Hemodynamically stable  Pulm: Saturating well on RA. Increase incentive spirometry, out of bed, and ambulation as tolerated.  GI: NPO for IR  : Voiding  Heme: Continue HSQ/Venodynes for DVT ppx. Increase OOB and ambulation.   ID: Cont cipro. No signs of infection at this time. IR, blood cultures negative. For repeat IR drainage today.   Loyda Flores PGY3

## 2018-10-02 NOTE — PROGRESS NOTE ADULT - ATTENDING COMMENTS
Patient seen and examined by me after IR procedure. Agree with resident assessment and plan. Dressing C/D/I. Stable for discharge home, will f/u in office early next week.
Pt seen and examined by me today. I agree with findings, assessment and plan documented in resident note.
patient feeling much better since starting IV antibiotics. No abdominal tenderness and no fever overnight.  Awaiting decision by IR re: drainage
OB Attg--late entry:  Patient seen and examined by me.   Feels well.  Endorses mild abd discomfort.  No fever or chills.   I spoke with IR team regarding no to minimal drain output and desire of pt and Ob team to d/c home without drain if possible and on PO abx. Will need pelvic CT without contrast to confirm drain in correct placement and appropriate to be removed.

## 2018-10-02 NOTE — PROGRESS NOTE ADULT - PROBLEM SELECTOR PROBLEM 1
Postoperative abscess, initial encounter

## 2018-10-02 NOTE — PROGRESS NOTE ADULT - SUBJECTIVE AND OBJECTIVE BOX
R3 OB Postpartum Note    Patient seen and examined at bedside.  Patient underwent IR drainage of pelvic collection yesterday which was uncomplicated. This morning patient has no complaints and pain is well-controlled.  Patient denies fevers, chills, nausea, and vomiting.  Patient is tolerating regular diet, passing flatus, ambulating, and is voiding spontaneously.      Physical exam:    Vital Signs Last 24 Hrs  T(C): 36.9 (29 Sep 2018 05:28), Max: 37.4 (28 Sep 2018 11:59)  T(F): 98.4 (29 Sep 2018 05:28), Max: 99.3 (28 Sep 2018 11:59)  HR: 68 (29 Sep 2018 05:28) (68 - 93)  BP: 109/63 (29 Sep 2018 05:28) (101/59 - 109/67)  BP(mean): --  RR: 18 (29 Sep 2018 05:28) (16 - 19)  SpO2: 99% (29 Sep 2018 05:28) (99% - 100%)    09-27 @ 07:01  -  09-28 @ 07:00  --------------------------------------------------------  IN: 0 mL / OUT: 1300 mL / NET: -1300 mL    09-28 @ 07:01  -  09-29 @ 06:04  --------------------------------------------------------  IN: 1900 mL / OUT: 792.5 mL / NET: 1107.5 mL        Gen: NAD  Abdomen: Soft, appropriate post-op tenderness, non- distended , firm uterine fundus at umbilicus.  Incision: Healed  LLQ drain serosanguinous drainage  Ext: No calf tenderness    LABS:                        7.9    10.08 )-----------( 521      ( 28 Sep 2018 05:05 )             24.6                         7.9    12.85 )-----------( 515      ( 27 Sep 2018 22:22 )             24.2       09-27-18 @ 22:22      135  |  100  |  9   ----------------------------<  116<H>  3.8   |  20<L>  |  0.94        Ca    8.6      27 Sep 2018 22:22    TPro  7.7  /  Alb  3.2<L>  /  TBili  0.5  /  DBili  x   /  AST  23  /  ALT  21  /  AlkPhos  79  09-27-18 @ 22:22
OU[place]ANE [place] [id number]    37yFemale with pelvic collection    Pre-op diagnosis: pelvic collecton    Post op diagnosis: pelvic collection    Indication: abdominal pain    Procedure: CT guided drainage of pelvic collection    Physician: Bello WILLIAM	  Assistant: Wilmer WILLIAM    Anesthesia (type): administered by anesthesiologist. 10cc 15 lidocaine given locally.    Contrast: none    EBL: minimal    Findings/Follow up Plan of Care: 555 cc of bloody fluid removed. 8.5 F drainage catheter placed.    Specimens Removed: 5cc sent for specimen.    Complications: none    Condition/Disposition: stable. recovery for one hour with bedrest. then back to floors.    Please call Interventional Radiology #[phone] with any questions, concerns, or issues.
R3  Patient seen and examined at bedside, no acute overnight events. No acute complaints, pain well controlled.  Patient is ambulating, voiding spontaneously, passing flatus, and tolerating regular diet.     Vital Signs Last 24 Hours  T(C): 36.8 (09-30-18 @ 01:36), Max: 36.9 (09-29-18 @ 09:55)  HR: 77 (09-30-18 @ 01:36) (75 - 85)  BP: 114/66 (09-30-18 @ 01:36) (99/60 - 114/66)  RR: 18 (09-30-18 @ 01:36) (17 - 18)  SpO2: 99% (09-30-18 @ 01:36) (99% - 100%)    Physical Exam:  General: NAD  Abdomen: Soft, mildly tender, non-distended, drain in place, minimal output  Pelvic: Lochia wnl  Ext: Non-tender b/l                7.8    7.55  )-----------( 474      ( 09-29 @ 06:02 )             24.0                7.9    10.08 )-----------( 521      ( 09-28 @ 05:05 )             24.6                7.9    12.85 )-----------( 515      ( 09-27 @ 22:22 )             24.2         MEDICATIONS  (STANDING):  clindamycin IVPB      clindamycin IVPB 900 milliGRAM(s) IV Intermittent every 8 hours  gentamicin   IVPB 80 milliGRAM(s) IV Intermittent every 8 hours  gentamicin   IVPB      heparin  Injectable 5000 Unit(s) SubCutaneous every 12 hours  influenza   Vaccine 0.5 milliLiter(s) IntraMuscular once  lactated ringers. 1000 milliLiter(s) (125 mL/Hr) IV Continuous <Continuous>    MEDICATIONS  (PRN):  ibuprofen  Tablet. 400 milliGRAM(s) Oral every 6 hours PRN Mild Pain (1 - 3)
R3 Ante Progress Note POD # 22, HD#6    Subjective:     Pt seen and examined at bedside. No events overnight. Pain well controlled. Patient ambulating. Passing flatus. NPO for IR procedure today. Pt denies fever, chills, chest pain, SOB, nausea, vomiting, lightheadedness, dizziness.      Objective:  T(F): 98.4 (10-02-18 @ 05:43), Max: 98.4 (10-02-18 @ 05:43)  HR: 78 (10-02-18 @ 05:43) (71 - 78)  BP: 118/62 (10-02-18 @ 05:43) (109/71 - 118/62)  RR: 17 (10-02-18 @ 05:43) (17 - 19)  SpO2: 98% (10-02-18 @ 05:43) (98% - 100%)    I&O's Summary    01 Oct 2018 07:01  -  02 Oct 2018 07:00  --------------------------------------------------------  IN: 1500 mL / OUT: 0 mL / NET: 1500 mL          MEDICATIONS  (STANDING):  ciprofloxacin     Tablet 500 milliGRAM(s) Oral every 12 hours  influenza   Vaccine 0.5 milliLiter(s) IntraMuscular once  lactated ringers. 1000 milliLiter(s) (125 mL/Hr) IV Continuous <Continuous>  lactated ringers. 1000 milliLiter(s) (125 mL/Hr) IV Continuous <Continuous>    MEDICATIONS  (PRN):  ibuprofen  Tablet. 400 milliGRAM(s) Oral every 6 hours PRN Mild Pain (1 - 3)      Physical Exam:  Constitutional: NAD, A+O x3  Abdomen: soft, NTND, no rebound or guarding  Incision: clean, dry, intact  BRISEIDA: no output from drain  Extremities: no lower extremity edema or calf tenderness bilaterally, venodynes in place    Labs:                        7.6    7.11  )-----------( 483      ( 02 Oct 2018 05:45 )             24.4   baso 0.7    eos 1.3    imm gran 0.4    lymph 20.5   mono 5.2    poly 71.9               PT/INR - ( 02 Oct 2018 05:45 )   PT: 12.3 SEC;   INR: 1.07          PTT - ( 02 Oct 2018 05:45 )  PTT:28.5 SEC
R3 OB Progress Note HD#2, POD#18    Subjective:     Pt seen and examined at bedside. No events overnight. Pain well controlled. Patient ambulating. She had one loose stool overnight and a normal BM yesterday. Tolerating regular diet, has been NPO overnight for possible IR today. Pt denies fever, chills, chest pain, SOB, nausea, vomiting, lightheadedness, dizziness.      Objective:  T(F): 99 (18 @ 06:04), Max: 101.1 (18 @ 18:36)  HR: 102 (18 @ 06:04) (102 - 110)  BP: 112/64 (18 @ 06:04) (105/59 - 117/71)  RR: 17 (18 @ 06:04) (17 - 18)  SpO2: 99% (18 @ 06:04) (96% - 100%)  Wt(kg): --  I&O's Summary    27 Sep 2018 07:01  -  28 Sep 2018 07:00  --------------------------------------------------------  IN: 0 mL / OUT: 1300 mL / NET: -1300 mL            MEDICATIONS  (STANDING):  clindamycin IVPB      clindamycin IVPB 900 milliGRAM(s) IV Intermittent every 8 hours  gentamicin   IVPB 80 milliGRAM(s) IV Intermittent every 8 hours  gentamicin   IVPB      influenza   Vaccine 0.5 milliLiter(s) IntraMuscular once  lactated ringers. 1000 milliLiter(s) (125 mL/Hr) IV Continuous <Continuous>    MEDICATIONS  (PRN):      Physical Exam:  Constitutional: NAD, A+O x3  CV: RRR  Lungs: clear to auscultation bilaterally  Abdomen: soft, nontender, nondistended  Incision: clean, dry, intact  : no bleeding  Extremities: no lower extremity edema or calf tenderness bilaterally    Labs:                        7.9    10.08 )-----------( 521      ( 28 Sep 2018 05:05 )             24.6   baso 0.7    eos 0.4    imm gran 0.5    lymph 14.2   mono 9.0    poly 75.2                         7.9    12.85 )-----------( 515      ( 27 Sep 2018 22:22 )             24.2   baso 0.5    eos 0.2    imm gran 0.4    lymph 12.3   mono 6.9    poly 79.7         135    |  100    |  9      ----------------------------<  116<H>  3.8     |  20<L>  |  0.94     Ca    8.6        27 Sep 2018 22:22    TPro  7.7    /  Alb  3.2<L>  /  TBili  0.5    /  DBili  x      /  AST  23     /  ALT  21     /  AlkPhos  79             PT/INR - ( 28 Sep 2018 05:05 )   PT: 15.8 SEC;   INR: 1.37          PTT - ( 28 Sep 2018 05:05 )  PTT:31.5 SEC  Urinalysis Basic - ( 27 Sep 2018 18:16 )    Color: COLORLESS / Appearance: CLEAR / S.004 / pH: 6.5  Gluc: NEGATIVE / Ketone: NEGATIVE  / Bili: NEGATIVE / Urobili: NORMAL   Blood: NEGATIVE / Protein: NEGATIVE / Nitrite: NEGATIVE   Leuk Esterase: SMALL / RBC: x / WBC x   Sq Epi: x / Non Sq Epi: x / Bacteria: x
R3 OB Progress Note POD # 21 HD#5     Subjective:     Pt seen and examined at bedside. No events overnight. Pain well controlled. Patient ambulating. Passing flatus. Tolerating regular diet. Pt denies fever, chills, chest pain, SOB, nausea, vomiting, lightheadedness, dizziness. She wants to go home without the drain if possible.     Objective:  T(F): 98.2 (10-01-18 @ 05:32), Max: 98.6 (09-30-18 @ 14:45)  HR: 73 (10-01-18 @ 05:32) (73 - 85)  BP: 121/79 (10-01-18 @ 05:32) (102/61 - 121/79)  RR: 18 (10-01-18 @ 05:32) (16 - 18)  SpO2: 100% (10-01-18 @ 05:32) (99% - 100%)    I&O's Summary    30 Sep 2018 07:01  -  01 Oct 2018 07:00  --------------------------------------------------------  IN: 1200 mL / OUT: 850 mL / NET: 350 mL      CAPILLARY BLOOD GLUCOSE          MEDICATIONS  (STANDING):  clindamycin IVPB      clindamycin IVPB 900 milliGRAM(s) IV Intermittent every 8 hours  gentamicin   IVPB 80 milliGRAM(s) IV Intermittent every 8 hours  gentamicin   IVPB      heparin  Injectable 5000 Unit(s) SubCutaneous every 12 hours  influenza   Vaccine 0.5 milliLiter(s) IntraMuscular once  lactated ringers. 1000 milliLiter(s) (125 mL/Hr) IV Continuous <Continuous>    MEDICATIONS  (PRN):  ibuprofen  Tablet. 400 milliGRAM(s) Oral every 6 hours PRN Mild Pain (1 - 3)      Physical Exam:  Constitutional: NAD, A+O x3  CV: RRR  Lungs: clear to auscultation bilaterally  Abdomen: soft, bowel sounds, appropriately tender, softly distended, no rebound or guarding  Incision: clean, dry, intact  BRISEIDA: no output  Extremities: no lower extremity edema or calf tenderness bilaterally, venodynes in place    Labs:                        7.8    7.55  )-----------( 474      ( 29 Sep 2018 06:02 )             24.0   baso 0.9    eos 0.9    imm gran 0.4    lymph 17.7   mono 7.5    poly 72.6
pt resting comfortably in chair, J-P drain with minimal drainage, pt is afebrile, VSS. continue current management.   Nikko CHAUDHARY

## 2018-10-03 ENCOUNTER — APPOINTMENT (OUTPATIENT)
Dept: OBGYN | Facility: CLINIC | Age: 38
End: 2018-10-03

## 2018-10-04 LAB — CULTURE - SURGICAL SITE: SIGNIFICANT CHANGE UP

## 2018-10-08 ENCOUNTER — APPOINTMENT (OUTPATIENT)
Dept: OBGYN | Facility: CLINIC | Age: 38
End: 2018-10-08
Payer: COMMERCIAL

## 2018-10-08 VITALS
HEIGHT: 67 IN | WEIGHT: 172 LBS | TEMPERATURE: 98 F | BODY MASS INDEX: 27 KG/M2 | HEART RATE: 70 BPM | DIASTOLIC BLOOD PRESSURE: 77 MMHG | SYSTOLIC BLOOD PRESSURE: 114 MMHG

## 2018-10-08 DIAGNOSIS — Z51.89 ENCOUNTER FOR OTHER SPECIFIED AFTERCARE: ICD-10-CM

## 2018-10-08 PROCEDURE — 0503F POSTPARTUM CARE VISIT: CPT

## 2018-10-10 ENCOUNTER — APPOINTMENT (OUTPATIENT)
Dept: FAMILY MEDICINE | Facility: CLINIC | Age: 38
End: 2018-10-10

## 2018-10-15 DIAGNOSIS — Z86.19 PERSONAL HISTORY OF OTHER INFECTIOUS AND PARASITIC DISEASES: ICD-10-CM

## 2018-10-17 ENCOUNTER — APPOINTMENT (OUTPATIENT)
Dept: FAMILY MEDICINE | Facility: CLINIC | Age: 38
End: 2018-10-17

## 2018-10-22 ENCOUNTER — APPOINTMENT (OUTPATIENT)
Dept: OBGYN | Facility: CLINIC | Age: 38
End: 2018-10-22
Payer: COMMERCIAL

## 2018-10-22 ENCOUNTER — APPOINTMENT (OUTPATIENT)
Dept: OBGYN | Facility: CLINIC | Age: 38
End: 2018-10-22

## 2018-10-22 VITALS
BODY MASS INDEX: 27.47 KG/M2 | HEART RATE: 60 BPM | HEIGHT: 67 IN | SYSTOLIC BLOOD PRESSURE: 117 MMHG | WEIGHT: 175 LBS | DIASTOLIC BLOOD PRESSURE: 78 MMHG

## 2018-10-22 PROCEDURE — 90688 IIV4 VACCINE SPLT 0.5 ML IM: CPT

## 2018-10-22 PROCEDURE — 0503F POSTPARTUM CARE VISIT: CPT

## 2018-10-22 PROCEDURE — 90471 IMMUNIZATION ADMIN: CPT

## 2018-10-30 LAB — FUNGUS SPEC QL CULT: SIGNIFICANT CHANGE UP

## 2018-11-11 ENCOUNTER — TRANSCRIPTION ENCOUNTER (OUTPATIENT)
Age: 38
End: 2018-11-11

## 2018-12-07 ENCOUNTER — APPOINTMENT (OUTPATIENT)
Dept: INTERNAL MEDICINE | Facility: CLINIC | Age: 38
End: 2018-12-07
Payer: COMMERCIAL

## 2018-12-07 VITALS
HEART RATE: 65 BPM | DIASTOLIC BLOOD PRESSURE: 60 MMHG | BODY MASS INDEX: 27.47 KG/M2 | OXYGEN SATURATION: 99 % | RESPIRATION RATE: 12 BRPM | WEIGHT: 175 LBS | HEIGHT: 67 IN | SYSTOLIC BLOOD PRESSURE: 97 MMHG

## 2018-12-07 DIAGNOSIS — H60.90 UNSPECIFIED OTITIS EXTERNA, UNSPECIFIED EAR: ICD-10-CM

## 2018-12-07 PROCEDURE — 99213 OFFICE O/P EST LOW 20 MIN: CPT

## 2018-12-07 NOTE — HISTORY OF PRESENT ILLNESS
[FreeTextEntry8] : Pt. here with recurrence of her left ear pain.\par She went to urgent care a few wks ago, given Bactrim, and Cortisporin.\par Symptoms improved. \par

## 2019-01-07 ENCOUNTER — APPOINTMENT (OUTPATIENT)
Dept: OTOLARYNGOLOGY | Facility: CLINIC | Age: 39
End: 2019-01-07

## 2019-01-30 ENCOUNTER — TRANSCRIPTION ENCOUNTER (OUTPATIENT)
Age: 39
End: 2019-01-30

## 2019-02-03 PROBLEM — O09.522 ELDERLY MULTIGRAVIDA IN SECOND TRIMESTER: Status: RESOLVED | Noted: 2018-03-12 | Resolved: 2019-02-03

## 2019-05-28 ENCOUNTER — APPOINTMENT (OUTPATIENT)
Dept: OBGYN | Facility: CLINIC | Age: 39
End: 2019-05-28
Payer: COMMERCIAL

## 2019-05-28 VITALS
WEIGHT: 182.1 LBS | DIASTOLIC BLOOD PRESSURE: 70 MMHG | HEIGHT: 67 IN | HEART RATE: 82 BPM | BODY MASS INDEX: 28.58 KG/M2 | SYSTOLIC BLOOD PRESSURE: 146 MMHG

## 2019-05-28 DIAGNOSIS — N64.4 MASTODYNIA: ICD-10-CM

## 2019-05-28 DIAGNOSIS — N93.9 ABNORMAL UTERINE AND VAGINAL BLEEDING, UNSPECIFIED: ICD-10-CM

## 2019-05-28 PROCEDURE — 99213 OFFICE O/P EST LOW 20 MIN: CPT

## 2019-05-29 PROBLEM — N64.4 NIPPLE PAIN: Status: ACTIVE | Noted: 2019-05-29

## 2019-05-29 PROBLEM — N93.9 VAGINAL SPOTTING: Status: ACTIVE | Noted: 2019-05-29

## 2019-05-29 RX ORDER — NEOMYCIN SULFATE, POLYMYXIN B SULFATE AND HYDROCORTISONE 3.5; 10000; 1 MG/ML; [IU]/ML; MG/ML
3.5-10000-1 SOLUTION AURICULAR (OTIC) 4 TIMES DAILY
Qty: 1 | Refills: 0 | Status: DISCONTINUED | COMMUNITY
Start: 2018-12-07 | End: 2019-05-29

## 2019-05-29 RX ORDER — METRONIDAZOLE 500 MG/1
500 TABLET ORAL
Qty: 14 | Refills: 0 | Status: DISCONTINUED | COMMUNITY
Start: 2018-09-26 | End: 2019-05-29

## 2019-05-29 RX ORDER — CIPROFLOXACIN HYDROCHLORIDE 500 MG/1
500 TABLET, FILM COATED ORAL
Qty: 14 | Refills: 0 | Status: DISCONTINUED | COMMUNITY
Start: 2018-09-26 | End: 2019-05-29

## 2019-05-29 RX ORDER — TERCONAZOLE 80 MG/1
80 SUPPOSITORY VAGINAL
Qty: 1 | Refills: 1 | Status: DISCONTINUED | COMMUNITY
Start: 2018-10-15 | End: 2019-05-29

## 2019-05-29 RX ORDER — GLUC/MSM/COLGN2/HYAL/ANTIARTH3 375-375-20
TABLET ORAL
Refills: 0 | Status: DISCONTINUED | COMMUNITY
End: 2019-05-29

## 2019-05-29 RX ORDER — ONDANSETRON 4 MG/1
4 TABLET, ORALLY DISINTEGRATING ORAL
Qty: 16 | Refills: 0 | Status: DISCONTINUED | COMMUNITY
Start: 2018-09-18 | End: 2019-05-29

## 2019-05-29 NOTE — CHIEF COMPLAINT
[Follow Up] : follow up GYN visit [FreeTextEntry1] : Right sided nipple lump and vaginal spotting x few months

## 2019-05-29 NOTE — PHYSICAL EXAM
[Alert] : alert [Awake] : awake [Examination Of The Breasts] : a normal appearance [No Lesions] : no genitalia lesions [Labia Majora] : labia major [Labia Minora] : labia minora [No Bleeding] : there was no active vaginal bleeding [Normal] : clitoris [Acute Distress] : no acute distress [Mass] : no breast mass [Tender] : no tenderness [Axillary LAD] : no axillary lymphadenopathy [Nipple Discharge] : no nipple discharge

## 2019-06-11 ENCOUNTER — TRANSCRIPTION ENCOUNTER (OUTPATIENT)
Age: 39
End: 2019-06-11

## 2019-06-11 ENCOUNTER — APPOINTMENT (OUTPATIENT)
Dept: OBGYN | Facility: CLINIC | Age: 39
End: 2019-06-11
Payer: COMMERCIAL

## 2019-06-11 VITALS
DIASTOLIC BLOOD PRESSURE: 69 MMHG | WEIGHT: 182 LBS | HEIGHT: 67 IN | BODY MASS INDEX: 28.56 KG/M2 | SYSTOLIC BLOOD PRESSURE: 110 MMHG | HEART RATE: 72 BPM

## 2019-06-11 PROCEDURE — 99213 OFFICE O/P EST LOW 20 MIN: CPT

## 2019-06-13 ENCOUNTER — OUTPATIENT (OUTPATIENT)
Dept: OUTPATIENT SERVICES | Facility: HOSPITAL | Age: 39
LOS: 1 days | End: 2019-06-13
Payer: COMMERCIAL

## 2019-06-13 ENCOUNTER — APPOINTMENT (OUTPATIENT)
Dept: MAMMOGRAPHY | Facility: CLINIC | Age: 39
End: 2019-06-13
Payer: COMMERCIAL

## 2019-06-13 ENCOUNTER — APPOINTMENT (OUTPATIENT)
Dept: ULTRASOUND IMAGING | Facility: CLINIC | Age: 39
End: 2019-06-13
Payer: COMMERCIAL

## 2019-06-13 DIAGNOSIS — N63.10 UNSPECIFIED LUMP IN THE RIGHT BREAST, UNSPECIFIED QUADRANT: ICD-10-CM

## 2019-06-13 DIAGNOSIS — Z90.711 ACQUIRED ABSENCE OF UTERUS WITH REMAINING CERVICAL STUMP: Chronic | ICD-10-CM

## 2019-06-13 DIAGNOSIS — M67.922 UNSPECIFIED DISORDER OF SYNOVIUM AND TENDON, LEFT UPPER ARM: Chronic | ICD-10-CM

## 2019-06-13 PROCEDURE — 76641 ULTRASOUND BREAST COMPLETE: CPT | Mod: 26,50

## 2019-06-13 PROCEDURE — 76641 ULTRASOUND BREAST COMPLETE: CPT

## 2019-06-18 NOTE — PHYSICAL EXAM
[Mass] : breast mass [Tender] : no tenderness [Nipple Discharge] : no nipple discharge [Axillary LAD] : no axillary lymphadenopathy

## 2019-08-05 ENCOUNTER — OUTPATIENT (OUTPATIENT)
Dept: OUTPATIENT SERVICES | Facility: HOSPITAL | Age: 39
LOS: 1 days | End: 2019-08-05
Payer: COMMERCIAL

## 2019-08-05 VITALS
DIASTOLIC BLOOD PRESSURE: 60 MMHG | RESPIRATION RATE: 16 BRPM | HEART RATE: 62 BPM | TEMPERATURE: 98 F | OXYGEN SATURATION: 98 % | WEIGHT: 182.1 LBS | HEIGHT: 66 IN | SYSTOLIC BLOOD PRESSURE: 100 MMHG

## 2019-08-05 DIAGNOSIS — M67.922 UNSPECIFIED DISORDER OF SYNOVIUM AND TENDON, LEFT UPPER ARM: Chronic | ICD-10-CM

## 2019-08-05 DIAGNOSIS — C50.919 MALIGNANT NEOPLASM OF UNSPECIFIED SITE OF UNSPECIFIED FEMALE BREAST: ICD-10-CM

## 2019-08-05 DIAGNOSIS — Z90.711 ACQUIRED ABSENCE OF UTERUS WITH REMAINING CERVICAL STUMP: Chronic | ICD-10-CM

## 2019-08-05 LAB
ANION GAP SERPL CALC-SCNC: 11 MMO/L — SIGNIFICANT CHANGE UP (ref 7–14)
BUN SERPL-MCNC: 16 MG/DL — SIGNIFICANT CHANGE UP (ref 7–23)
CALCIUM SERPL-MCNC: 9.6 MG/DL — SIGNIFICANT CHANGE UP (ref 8.4–10.5)
CHLORIDE SERPL-SCNC: 101 MMOL/L — SIGNIFICANT CHANGE UP (ref 98–107)
CO2 SERPL-SCNC: 24 MMOL/L — SIGNIFICANT CHANGE UP (ref 22–31)
CREAT SERPL-MCNC: 0.61 MG/DL — SIGNIFICANT CHANGE UP (ref 0.5–1.3)
GLUCOSE SERPL-MCNC: 75 MG/DL — SIGNIFICANT CHANGE UP (ref 70–99)
HCT VFR BLD CALC: 37.9 % — SIGNIFICANT CHANGE UP (ref 34.5–45)
HGB BLD-MCNC: 12.2 G/DL — SIGNIFICANT CHANGE UP (ref 11.5–15.5)
MCHC RBC-ENTMCNC: 29.3 PG — SIGNIFICANT CHANGE UP (ref 27–34)
MCHC RBC-ENTMCNC: 32.2 % — SIGNIFICANT CHANGE UP (ref 32–36)
MCV RBC AUTO: 91.1 FL — SIGNIFICANT CHANGE UP (ref 80–100)
NRBC # FLD: 0 K/UL — SIGNIFICANT CHANGE UP (ref 0–0)
PLATELET # BLD AUTO: 257 K/UL — SIGNIFICANT CHANGE UP (ref 150–400)
PMV BLD: 11.5 FL — SIGNIFICANT CHANGE UP (ref 7–13)
POTASSIUM SERPL-MCNC: 4.1 MMOL/L — SIGNIFICANT CHANGE UP (ref 3.5–5.3)
POTASSIUM SERPL-SCNC: 4.1 MMOL/L — SIGNIFICANT CHANGE UP (ref 3.5–5.3)
RBC # BLD: 4.16 M/UL — SIGNIFICANT CHANGE UP (ref 3.8–5.2)
RBC # FLD: 12.4 % — SIGNIFICANT CHANGE UP (ref 10.3–14.5)
SODIUM SERPL-SCNC: 136 MMOL/L — SIGNIFICANT CHANGE UP (ref 135–145)
WBC # BLD: 9.63 K/UL — SIGNIFICANT CHANGE UP (ref 3.8–10.5)
WBC # FLD AUTO: 9.63 K/UL — SIGNIFICANT CHANGE UP (ref 3.8–10.5)

## 2019-08-05 PROCEDURE — 93010 ELECTROCARDIOGRAM REPORT: CPT

## 2019-08-05 NOTE — H&P PST ADULT - NSICDXPASTMEDICALHX_GEN_ALL_CORE_FT
PAST MEDICAL HISTORY:  Supraventricular dysrhythmia     Vasovagal syncope PAST MEDICAL HISTORY:  H/O malignant neoplasm of nipple and areola     PTSD (post-traumatic stress disorder)     Supraventricular dysrhythmia     Vasovagal syncope

## 2019-08-05 NOTE — H&P PST ADULT - HISTORY OF PRESENT ILLNESS
39y/o female presents for preop 39y/o female presents for preop eval for scheduled incision right areola nodule on 8/13/2019.  Pt states self detected during self breast exam approx 2 months ago.

## 2019-08-05 NOTE — H&P PST ADULT - NSICDXPROBLEM_GEN_ALL_CORE_FT
malignant neoplasm of nipple & areola:  scheduled incision right areola nodule on 8/13/2019  written & verbal preop instructions, gi prophylaxis & surgical soap given  pt verbalized good understanding, teach back method done.  OR booking notified of allergies via fax

## 2019-08-05 NOTE — H&P PST ADULT - LYMPHATIC
posterior cervical L/anterior cervical L/anterior cervical R/supraclavicular R/posterior cervical R/supraclavicular L

## 2019-08-05 NOTE — H&P PST ADULT - NSICDXPASTSURGICALHX_GEN_ALL_CORE_FT
PAST SURGICAL HISTORY:   delivery delivered     S/P abdominal supracervical subtotal hysterectomy     Tendinopathy of left biceps tendon 2016

## 2019-08-05 NOTE — H&P PST ADULT - NEGATIVE CARDIOVASCULAR SYMPTOMS
no claudication/no dyspnea on exertion/no orthopnea/no chest pain/no palpitations/no paroxysmal nocturnal dyspnea/no peripheral edema

## 2019-08-08 ENCOUNTER — APPOINTMENT (OUTPATIENT)
Dept: FAMILY MEDICINE | Facility: CLINIC | Age: 39
End: 2019-08-08
Payer: COMMERCIAL

## 2019-08-08 VITALS
SYSTOLIC BLOOD PRESSURE: 106 MMHG | RESPIRATION RATE: 12 BRPM | BODY MASS INDEX: 29.66 KG/M2 | WEIGHT: 189 LBS | DIASTOLIC BLOOD PRESSURE: 71 MMHG | OXYGEN SATURATION: 99 % | HEART RATE: 62 BPM | HEIGHT: 67 IN

## 2019-08-08 DIAGNOSIS — N63.10 UNSPECIFIED LUMP IN THE RIGHT BREAST, UNSPECIFIED QUADRANT: ICD-10-CM

## 2019-08-08 PROCEDURE — 99214 OFFICE O/P EST MOD 30 MIN: CPT

## 2019-08-08 NOTE — RESULTS/DATA
[] : results reviewed [de-identified] : wnl [de-identified] : sinus no acute st changes, 62 bpm [de-identified] : wnl

## 2019-08-08 NOTE — HISTORY OF PRESENT ILLNESS
[No Pertinent Cardiac History] : no history of aortic stenosis, atrial fibrillation, coronary artery disease, recent myocardial infarction, or implantable device/pacemaker [Aortic Stenosis] : no aortic stenosis [Atrial Fibrillation] : no atrial fibrillation [Coronary Artery Disease] : no coronary artery disease [Recent Myocardial Infarction] : no recent myocardial infarction [Implantable Device/Pacemaker] : no implantable device/pacemaker [Asthma] : no asthma [No Pertinent Pulmonary History] : no history of asthma, COPD, sleep apnea, or smoking [COPD] : no COPD [Sleep Apnea] : no sleep apnea [Smoker] : not a smoker [No Adverse Anesthesia Reaction] : no adverse anesthesia reaction in self or family member [Chronic Anticoagulation] : no chronic anticoagulation [Diabetes] : no diabetes [Chronic Kidney Disease] : no chronic kidney disease [Moderate (4-6 METs)] : Moderate (4-6 METs) [FreeTextEntry1] : breast nodule removal [FreeTextEntry3] : Dr. Way [FreeTextEntry2] : Aug 13, 2019 [FreeTextEntry7] : ekg sinus , no acute st changes, 62 bpm [FreeTextEntry4] : hisotry of nodule right nipple, us negatvie [FreeTextEntry6] : svt, ablation, 2009, no symptoms since ablation

## 2019-08-08 NOTE — PHYSICAL EXAM
[Well Nourished] : well nourished [No Acute Distress] : no acute distress [Well-Appearing] : well-appearing [Well Developed] : well developed [PERRL] : pupils equal round and reactive to light [Normal Sclera/Conjunctiva] : normal sclera/conjunctiva [EOMI] : extraocular movements intact [Normal Outer Ear/Nose] : the outer ears and nose were normal in appearance [Normal Oropharynx] : the oropharynx was normal [No JVD] : no jugular venous distention [No Lymphadenopathy] : no lymphadenopathy [Supple] : supple [Thyroid Normal, No Nodules] : the thyroid was normal and there were no nodules present [No Respiratory Distress] : no respiratory distress  [No Accessory Muscle Use] : no accessory muscle use [Clear to Auscultation] : lungs were clear to auscultation bilaterally [Normal Rate] : normal rate  [Regular Rhythm] : with a regular rhythm [No Carotid Bruits] : no carotid bruits [No Murmur] : no murmur heard [Normal S1, S2] : normal S1 and S2 [No Abdominal Bruit] : a ~M bruit was not heard ~T in the abdomen [No Varicosities] : no varicosities [Pedal Pulses Present] : the pedal pulses are present [No Edema] : there was no peripheral edema [No Extremity Clubbing/Cyanosis] : no extremity clubbing/cyanosis [No Palpable Aorta] : no palpable aorta [Non-distended] : non-distended [Soft] : abdomen soft [Non Tender] : non-tender [No Masses] : no abdominal mass palpated [No HSM] : no HSM [Normal Bowel Sounds] : normal bowel sounds [Normal Posterior Cervical Nodes] : no posterior cervical lymphadenopathy [Normal Anterior Cervical Nodes] : no anterior cervical lymphadenopathy [No Spinal Tenderness] : no spinal tenderness [No CVA Tenderness] : no CVA  tenderness [No Joint Swelling] : no joint swelling [Grossly Normal Strength/Tone] : grossly normal strength/tone [Coordination Grossly Intact] : coordination grossly intact [No Rash] : no rash [No Focal Deficits] : no focal deficits [Normal Gait] : normal gait [Deep Tendon Reflexes (DTR)] : deep tendon reflexes were 2+ and symmetric [Normal Insight/Judgement] : insight and judgment were intact [Normal Affect] : the affect was normal

## 2019-08-09 ENCOUNTER — APPOINTMENT (OUTPATIENT)
Dept: INTERNAL MEDICINE | Facility: CLINIC | Age: 39
End: 2019-08-09

## 2019-08-12 ENCOUNTER — TRANSCRIPTION ENCOUNTER (OUTPATIENT)
Age: 39
End: 2019-08-12

## 2019-08-13 ENCOUNTER — RESULT REVIEW (OUTPATIENT)
Age: 39
End: 2019-08-13

## 2019-08-13 ENCOUNTER — OUTPATIENT (OUTPATIENT)
Dept: OUTPATIENT SERVICES | Facility: HOSPITAL | Age: 39
LOS: 1 days | Discharge: HOME | End: 2019-08-13
Payer: COMMERCIAL

## 2019-08-13 VITALS
HEIGHT: 66 IN | SYSTOLIC BLOOD PRESSURE: 102 MMHG | HEART RATE: 69 BPM | RESPIRATION RATE: 16 BRPM | TEMPERATURE: 98 F | WEIGHT: 182.1 LBS | DIASTOLIC BLOOD PRESSURE: 58 MMHG | OXYGEN SATURATION: 100 %

## 2019-08-13 VITALS
DIASTOLIC BLOOD PRESSURE: 67 MMHG | OXYGEN SATURATION: 100 % | TEMPERATURE: 98 F | RESPIRATION RATE: 16 BRPM | SYSTOLIC BLOOD PRESSURE: 104 MMHG | HEART RATE: 75 BPM

## 2019-08-13 DIAGNOSIS — C50.919 MALIGNANT NEOPLASM OF UNSPECIFIED SITE OF UNSPECIFIED FEMALE BREAST: ICD-10-CM

## 2019-08-13 DIAGNOSIS — M67.922 UNSPECIFIED DISORDER OF SYNOVIUM AND TENDON, LEFT UPPER ARM: Chronic | ICD-10-CM

## 2019-08-13 DIAGNOSIS — Z90.711 ACQUIRED ABSENCE OF UTERUS WITH REMAINING CERVICAL STUMP: Chronic | ICD-10-CM

## 2019-08-13 PROCEDURE — 88304 TISSUE EXAM BY PATHOLOGIST: CPT | Mod: 26

## 2019-08-13 NOTE — ASU DISCHARGE PLAN (ADULT/PEDIATRIC) - SPECIFY DIET AND FLUID
Nothing spicy, greasy or fried food for the first 12 hours to prevent nausea and vomiting.  start with clear liquids and gradually increase your diet as you can, until you return to your normal diet.

## 2019-08-13 NOTE — ASU DISCHARGE PLAN (ADULT/PEDIATRIC) - CALL YOUR DOCTOR IF YOU HAVE ANY OF THE FOLLOWING:
Bleeding that does not stop/Wound/Surgical Site with redness, or foul smelling discharge or pus/Pain not relieved by Medications/Swelling that gets worse Wound/Surgical Site with redness, or foul smelling discharge or pus/Swelling that gets worse/Nausea and vomiting that does not stop/Pain not relieved by Medications/Fever greater than (need to indicate Fahrenheit or Celsius)/Bleeding that does not stop/Unable to urinate/Inability to tolerate liquids or foods

## 2019-08-13 NOTE — ASU DISCHARGE PLAN (ADULT/PEDIATRIC) - ASU DC SPECIAL INSTRUCTIONSFT
PAIN CONTROL: Take over the counter pain medicine as needed. You may also use ice to relieve soreness around the incision site.   WOUND CARE: Keep dressing dry for 48 hours. You may remove the outer dressing at that time and shower.   BATHING: Please do not submerge wound underwater. You may shower after 48 hours and/or sponge bathe.  ACTIVITY: No heavy lifting or straining. Otherwise, you may return to your usual level of physical activity.   DIET: Return to your usual diet.  NOTIFY YOUR SURGEON IF: You have any bleeding that does not stop, any pus draining from your wound, any fever (over 100.4 F) or chills, persistent nausea/vomiting, persistent diarrhea, or if your pain is not controlled on your discharge pain medications.  FOLLOW-UP:  1. Please follow up with Dr. Way within 1-2 weeks after discharge from the hospital. You may call [phone] to schedule an appointment.   2. Please follow up with your primary care physician in one week regarding your hospitalization.

## 2019-08-13 NOTE — PROVIDER CONTACT NOTE (OTHER) - SITUATION
Pt c/o itchness of her upper lip and foot. Pt denies any chest pain, sob, lightheadedness or any other sympotms.

## 2019-08-13 NOTE — ASU DISCHARGE PLAN (ADULT/PEDIATRIC) - CARE PROVIDER_API CALL
Anna Way)  Surgery  [address], [address]  [place] [address]  Phone: [phone]  Fax: [phone]  Follow Up Time:

## 2019-08-19 LAB — SURGICAL PATHOLOGY STUDY: SIGNIFICANT CHANGE UP

## 2019-09-05 ENCOUNTER — TRANSCRIPTION ENCOUNTER (OUTPATIENT)
Age: 39
End: 2019-09-05

## 2019-09-27 NOTE — PROGRESS NOTE ADULT - PROBLEM SELECTOR PROBLEM 1
REASON FOR VISIT    The patient is 2 years postop following revision breast reconstruction. She notes tightnening right breast over last month. Denies any recent infection or illness. No redness or swelling. Some discomfort.    Past Medical History:   Diagnosis Date   • Acid reflux     omeprazole as needed   • ADHD (attention deficit hyperactivity disorder) 2010   • Allergic rhinitis     seasonal/ uses flonase   • Anxiety    • Breast cancer (CMS/HCC) 2012    R breast [facility] Clinical [id number]   • Controlled substance agreement signed 2017   • Depression    • Hypothyroidism 2008   • Malignant neoplasm of breast (female) (CMS/[facility]) 2012   • Migraine    • PONV (postoperative nausea and vomiting)     vomiting after surgery; scope patch worked     Past Surgical History:   Procedure Laterality Date   • ------------other-------------  2012    L breast implant inserted   • ------------other-------------  2013    L chest expander for breast reconstruction inserted   • Breast cyst excision  2012    R breast cancer with high grade DCIS   • Breast surgery  2012    Recemt evacuation of fluid at site of L mastectomy, drain is removed at this time   • Bx/remv,lymph node,deep axill  2012    Bilateral [facility] Dr Alexandrea Patel   •  delivery only   and    • Colonoscopy  2017    colitis   • Delay breast pros after breast surg  2013   • Drainage of hematoma/fluid  2012    removal of infected L breast implant Dr Rob   • Hysteroscopy  2010    Ablation   • Lipoma resection Left 2019    Upper Buttock-Dr Spencer Barron MD    • Mastectomy simple complete  2012    Bilateral [facility] Dr Alexandrea Patel   • Portacath placement  2012    [facility]- total lenght of catheter 27 cm   • Removal of fallopian tube      tubal ligation   • [place] lymph node biopsy  2012    Bilateral [facility] Dr Alexandrea Patel   • Tonsillectomy Bilateral 2019     Denis Lopez MD   • Us guided needle placement  03/20/2012    Rt Breast , sclerosing adenosis   • Uvulectomy     • Vaginal hysterectomy      UTER <250 gm/ ovaries intact ,menorrhagia     Current Outpatient Medications   Medication Sig Dispense Refill   • FLUoxetine (PROZAC) 20 MG capsule Take 3 capsules by mouth once daily. 90 capsule 0   • amphetamine-dextroamphetamine (ADDERALL) 30 MG tablet Take 1 tablet by mouth 2 times daily. 60 tablet 0   • pantoprazole (PROTONIX) 40 MG tablet TAKE 1 TABLET BY MOUTH DAILY 90 tablet 0   • erythromycin (EMGEL) 2 % gel Apply topically 2 times daily. 30 g 1   • ALPRAZolam (XANAX) 0.5 MG tablet Take 1 tablet by mouth 3 times daily. 30 tablet 0   • fluticasone (FLONASE) 50 MCG/ACT nasal spray Spray 1 spray in each nostril daily.      • Multiple Vitamin (MULTI-VITAMINS) Tab Take 1 tablet by mouth.     • busPIRone (BUSPAR) 10 MG tablet Take 1 tablet by mouth 2 times daily. 60 tablet 3   • levothyroxine (SYNTHROID, LEVOTHROID) 112 MCG tablet Take 1 tablet by mouth daily. 90 tablet 3   • gabapentin (NEURONTIN) 300 MG capsule Take 1 capsule by mouth daily. (Patient taking differently: Take 300 mg by mouth as needed. ) 30 capsule 0   • dicyclomine (BENTYL) 10 MG capsule Take 1 capsule by mouth 4 times daily as needed (Abdominal pain). 60 capsule 2   • sumatriptan (IMITREX) 50 MG tablet Take 1 tablet by mouth as needed for Migraine. 9 tablet 0   • cetirizine (ZYRTEC) 10 MG tablet Take 10 mg by mouth 2 times daily.       No current facility-administered medications for this visit.      ALLERGIES:   Allergen Reactions   • Codeine PRURITUS   • Doxycycline PRURITUS   • Hydrocodone-Acetaminophen Nausea & Vomiting     Family History   Problem Relation Age of Onset   • Hypertension Mother    • Stroke Mother    • Thyroid Mother         thyroid removed for hyperthyroid   • Asthma Mother    • Hypertension Father    • Scoliosis Father    • Diabetes Maternal Grandmother         oral agents type 2    • Thyroid Maternal Grandmother         hypothyroid   • Scoliosis Paternal Grandmother    • Cancer, Prostate Paternal Grandfather    • ADHD/ADD Son    • Depression Son        ROS: 14 point review negative aside from HPI.     EXAM    Visit Vitals  BP (!) 135/97   Pulse 73   Resp 16   Ht 5' 7\" (1.702 m)   Wt 104.3 kg   LMP 01/01/2012   BMI 36.02 kg/m²       The patient is alert and appears comfortable.    The incisions are clean, dry, and intact without erythema, necrosis, or separation.  Bilateral breast with capsular contracture, right worse than left. Thin skin envelope.  Lungs CTAB  Heart RRR    IMPRESSION  S/p mastectomy and reconstruction   h/o breast cancer   acquired absence breasts  Capsular contracture.    PLAN    We discussed options for capsular contracture management including monitoring, singulair or capsulotomy/capsulectomy.   Patient would like to proceed with capsulectomy/capsulotomy and implant exchange.   RBA reviewed. All questions answered.          Other immediate postpartum hemorrhage

## 2019-10-07 PROBLEM — Z85.3 PERSONAL HISTORY OF MALIGNANT NEOPLASM OF BREAST: Chronic | Status: ACTIVE | Noted: 2019-08-05

## 2019-10-07 PROBLEM — F43.10 POST-TRAUMATIC STRESS DISORDER, UNSPECIFIED: Chronic | Status: ACTIVE | Noted: 2019-08-05

## 2019-10-21 ENCOUNTER — APPOINTMENT (OUTPATIENT)
Dept: INTERNAL MEDICINE | Facility: CLINIC | Age: 39
End: 2019-10-21
Payer: COMMERCIAL

## 2019-10-21 PROCEDURE — 90686 IIV4 VACC NO PRSV 0.5 ML IM: CPT

## 2019-10-21 PROCEDURE — G0008: CPT

## 2019-10-30 ENCOUNTER — TRANSCRIPTION ENCOUNTER (OUTPATIENT)
Age: 39
End: 2019-10-30

## 2019-11-18 ENCOUNTER — APPOINTMENT (OUTPATIENT)
Dept: INTERNAL MEDICINE | Facility: CLINIC | Age: 39
End: 2019-11-18
Payer: COMMERCIAL

## 2019-11-18 VITALS
HEIGHT: 67 IN | WEIGHT: 177 LBS | HEART RATE: 60 BPM | BODY MASS INDEX: 27.78 KG/M2 | RESPIRATION RATE: 12 BRPM | DIASTOLIC BLOOD PRESSURE: 63 MMHG | OXYGEN SATURATION: 99 % | SYSTOLIC BLOOD PRESSURE: 100 MMHG

## 2019-11-18 DIAGNOSIS — R10.9 UNSPECIFIED ABDOMINAL PAIN: ICD-10-CM

## 2019-11-18 DIAGNOSIS — R50.82 POSTPROCEDURAL FEVER: ICD-10-CM

## 2019-11-18 DIAGNOSIS — Z87.59 PERSONAL HISTORY OF OTHER COMPLICATIONS OF PREGNANCY, CHILDBIRTH AND THE PUERPERIUM: ICD-10-CM

## 2019-11-18 DIAGNOSIS — Z87.898 PERSONAL HISTORY OF OTHER SPECIFIED CONDITIONS: ICD-10-CM

## 2019-11-18 DIAGNOSIS — Z01.419 ENCOUNTER FOR GYNECOLOGICAL EXAMINATION (GENERAL) (ROUTINE) W/OUT ABNORMAL FINDINGS: ICD-10-CM

## 2019-11-18 LAB
ALBUMIN SERPL ELPH-MCNC: 4.5 G/DL
ALP BLD-CCNC: 43 U/L
ALT SERPL-CCNC: 5 U/L
ANION GAP SERPL CALC-SCNC: 12 MMOL/L
AST SERPL-CCNC: 12 U/L
BASOPHILS # BLD AUTO: 0.06 K/UL
BASOPHILS NFR BLD AUTO: 0.9 %
BILIRUB SERPL-MCNC: 0.4 MG/DL
BUN SERPL-MCNC: 11 MG/DL
CALCIUM SERPL-MCNC: 9.4 MG/DL
CHLORIDE SERPL-SCNC: 106 MMOL/L
CHOLEST SERPL-MCNC: 169 MG/DL
CHOLEST/HDLC SERPL: 3.5 RATIO
CO2 SERPL-SCNC: 24 MMOL/L
CREAT SERPL-MCNC: 0.68 MG/DL
EOSINOPHIL # BLD AUTO: 0.15 K/UL
EOSINOPHIL NFR BLD AUTO: 2.4 %
ESTIMATED AVERAGE GLUCOSE: 100 MG/DL
GLUCOSE SERPL-MCNC: 100 MG/DL
HBA1C MFR BLD HPLC: 5.1 %
HCT VFR BLD CALC: 38.6 %
HDLC SERPL-MCNC: 48 MG/DL
HGB BLD-MCNC: 12.5 G/DL
IMM GRANULOCYTES NFR BLD AUTO: 0.2 %
LDLC SERPL CALC-MCNC: 106 MG/DL
LYMPHOCYTES # BLD AUTO: 2.16 K/UL
LYMPHOCYTES NFR BLD AUTO: 33.9 %
MAN DIFF?: NORMAL
MCHC RBC-ENTMCNC: 29.7 PG
MCHC RBC-ENTMCNC: 32.4 GM/DL
MCV RBC AUTO: 91.7 FL
MONOCYTES # BLD AUTO: 0.49 K/UL
MONOCYTES NFR BLD AUTO: 7.7 %
NEUTROPHILS # BLD AUTO: 3.5 K/UL
NEUTROPHILS NFR BLD AUTO: 54.9 %
PLATELET # BLD AUTO: 281 K/UL
POTASSIUM SERPL-SCNC: 4.5 MMOL/L
PROT SERPL-MCNC: 7.5 G/DL
RBC # BLD: 4.21 M/UL
RBC # FLD: 13 %
SODIUM SERPL-SCNC: 142 MMOL/L
TRIGL SERPL-MCNC: 77 MG/DL
TSH SERPL-ACNC: 1.34 UIU/ML
WBC # FLD AUTO: 6.37 K/UL

## 2019-11-18 PROCEDURE — 99395 PREV VISIT EST AGE 18-39: CPT

## 2019-11-18 RX ORDER — PRENATAL VIT 49/IRON FUM/FOLIC 6.75-0.2MG
TABLET ORAL
Refills: 0 | Status: DISCONTINUED | COMMUNITY
End: 2019-11-18

## 2019-11-18 NOTE — PLAN
[FreeTextEntry1] : Reviewed blood work.\par Discussed exercise and weight loss options.\par Referral for a new gynecology.

## 2019-11-18 NOTE — PHYSICAL EXAM
[No Acute Distress] : no acute distress [Well Developed] : well developed [Well Nourished] : well nourished [Normal Sclera/Conjunctiva] : normal sclera/conjunctiva [PERRL] : pupils equal round and reactive to light [Well-Appearing] : well-appearing [EOMI] : extraocular movements intact [Normal Outer Ear/Nose] : the outer ears and nose were normal in appearance [Normal Oropharynx] : the oropharynx was normal [No JVD] : no jugular venous distention [No Lymphadenopathy] : no lymphadenopathy [Supple] : supple [Thyroid Normal, No Nodules] : the thyroid was normal and there were no nodules present [No Respiratory Distress] : no respiratory distress  [No Accessory Muscle Use] : no accessory muscle use [Normal Rate] : normal rate  [Clear to Auscultation] : lungs were clear to auscultation bilaterally [Normal S1, S2] : normal S1 and S2 [Regular Rhythm] : with a regular rhythm [No Murmur] : no murmur heard [No Carotid Bruits] : no carotid bruits [No Abdominal Bruit] : a ~M bruit was not heard ~T in the abdomen [No Varicosities] : no varicosities [Pedal Pulses Present] : the pedal pulses are present [No Edema] : there was no peripheral edema [No Palpable Aorta] : no palpable aorta [No Extremity Clubbing/Cyanosis] : no extremity clubbing/cyanosis [Soft] : abdomen soft [Non Tender] : non-tender [Non-distended] : non-distended [No Masses] : no abdominal mass palpated [No HSM] : no HSM [Normal Bowel Sounds] : normal bowel sounds [Normal Posterior Cervical Nodes] : no posterior cervical lymphadenopathy [Normal Anterior Cervical Nodes] : no anterior cervical lymphadenopathy [No CVA Tenderness] : no CVA  tenderness [No Joint Swelling] : no joint swelling [No Spinal Tenderness] : no spinal tenderness [No Rash] : no rash [Grossly Normal Strength/Tone] : grossly normal strength/tone [Coordination Grossly Intact] : coordination grossly intact [No Focal Deficits] : no focal deficits [Normal Gait] : normal gait [Deep Tendon Reflexes (DTR)] : deep tendon reflexes were 2+ and symmetric [Normal Insight/Judgement] : insight and judgment were intact [Normal Affect] : the affect was normal

## 2019-11-18 NOTE — HEALTH RISK ASSESSMENT
[Very Good] : ~his/her~ current health as very good [Excellent] : ~his/her~  mood as  excellent [Yes] : Yes [1 or 2 (0 pts)] : 1 or 2 (0 points) [Monthly or less (1 pt)] : Monthly or less (1 point) [No] : In the past 12 months have you used drugs other than those required for medical reasons? No [0] : 2) Feeling down, depressed, or hopeless: Not at all (0) [No falls in past year] : Patient reported no falls in the past year [Fully functional (bathing, dressing, toileting, transferring, walking, feeding)] : Fully functional (bathing, dressing, toileting, transferring, walking, feeding) [Fully functional (using the telephone, shopping, preparing meals, housekeeping, doing laundry, using] : Fully functional and needs no help or supervision to perform IADLs (using the telephone, shopping, preparing meals, housekeeping, doing laundry, using transportation, managing medications and managing finances) [FreeTextEntry1] : none [de-identified] : Breast surgeon [de-identified] : Treadmill. [] : No [[id number]] : 0 [de-identified] : healthy [Reports changes in vision] : Reports no changes in vision [Reports changes in hearing] : Reports no changes in hearing [Reports changes in dental health] : Reports no changes in dental health

## 2020-01-30 ENCOUNTER — TRANSCRIPTION ENCOUNTER (OUTPATIENT)
Age: 40
End: 2020-01-30

## 2020-02-12 ENCOUNTER — TRANSCRIPTION ENCOUNTER (OUTPATIENT)
Age: 40
End: 2020-02-12

## 2020-02-25 ENCOUNTER — APPOINTMENT (OUTPATIENT)
Dept: FAMILY MEDICINE | Facility: CLINIC | Age: 40
End: 2020-02-25
Payer: COMMERCIAL

## 2020-02-25 ENCOUNTER — RESULT CHARGE (OUTPATIENT)
Age: 40
End: 2020-02-25

## 2020-02-25 VITALS
BODY MASS INDEX: 29.23 KG/M2 | HEIGHT: 67 IN | RESPIRATION RATE: 16 BRPM | SYSTOLIC BLOOD PRESSURE: 106 MMHG | WEIGHT: 186.25 LBS | TEMPERATURE: 97.8 F | OXYGEN SATURATION: 97 % | DIASTOLIC BLOOD PRESSURE: 70 MMHG | HEART RATE: 77 BPM

## 2020-02-25 DIAGNOSIS — J02.0 STREPTOCOCCAL PHARYNGITIS: ICD-10-CM

## 2020-02-25 LAB — S PYO AG SPEC QL IA: POSITIVE

## 2020-02-25 PROCEDURE — 87880 STREP A ASSAY W/OPTIC: CPT | Mod: QW

## 2020-02-25 PROCEDURE — 99213 OFFICE O/P EST LOW 20 MIN: CPT | Mod: 25

## 2020-02-25 NOTE — HISTORY OF PRESENT ILLNESS
[FreeTextEntry8] : Acute sore throat since this morning. Reports being treated for a strep throat 2 weeks ago with Doxycycline from urgent care. Notes that daughter was diagnosed with strep this week. \par \par She denies constitutional symptoms and fever.

## 2020-02-25 NOTE — PHYSICAL EXAM
[No Acute Distress] : no acute distress [Well Nourished] : well nourished [Well-Appearing] : well-appearing [Well Developed] : well developed [Normal Sclera/Conjunctiva] : normal sclera/conjunctiva [PERRL] : pupils equal round and reactive to light [EOMI] : extraocular movements intact [Normal Outer Ear/Nose] : the outer ears and nose were normal in appearance [No JVD] : no jugular venous distention [Normal Oropharynx] : the oropharynx was normal [Thyroid Normal, No Nodules] : the thyroid was normal and there were no nodules present [Supple] : supple [No Lymphadenopathy] : no lymphadenopathy [No Accessory Muscle Use] : no accessory muscle use [Clear to Auscultation] : lungs were clear to auscultation bilaterally [No Respiratory Distress] : no respiratory distress  [Normal Rate] : normal rate  [Regular Rhythm] : with a regular rhythm [Normal S1, S2] : normal S1 and S2 [No Carotid Bruits] : no carotid bruits [No Abdominal Bruit] : a ~M bruit was not heard ~T in the abdomen [No Murmur] : no murmur heard [No Varicosities] : no varicosities [Pedal Pulses Present] : the pedal pulses are present [No Palpable Aorta] : no palpable aorta [No Extremity Clubbing/Cyanosis] : no extremity clubbing/cyanosis [No Edema] : there was no peripheral edema [Normal Posterior Cervical Nodes] : no posterior cervical lymphadenopathy [Normal Anterior Cervical Nodes] : no anterior cervical lymphadenopathy [No CVA Tenderness] : no CVA  tenderness [No Joint Swelling] : no joint swelling [Grossly Normal Strength/Tone] : grossly normal strength/tone [No Spinal Tenderness] : no spinal tenderness [No Focal Deficits] : no focal deficits [Coordination Grossly Intact] : coordination grossly intact [No Rash] : no rash [Normal Gait] : normal gait [Normal Affect] : the affect was normal [Deep Tendon Reflexes (DTR)] : deep tendon reflexes were 2+ and symmetric [Normal Insight/Judgement] : insight and judgment were intact [de-identified] : Injected pharynx. Edematous left tonsil with white exudates.

## 2020-09-06 ENCOUNTER — TRANSCRIPTION ENCOUNTER (OUTPATIENT)
Age: 40
End: 2020-09-06

## 2020-09-09 ENCOUNTER — APPOINTMENT (OUTPATIENT)
Dept: FAMILY MEDICINE | Facility: CLINIC | Age: 40
End: 2020-09-09
Payer: COMMERCIAL

## 2020-09-09 VITALS
SYSTOLIC BLOOD PRESSURE: 110 MMHG | TEMPERATURE: 97.8 F | HEART RATE: 78 BPM | HEIGHT: 67 IN | WEIGHT: 189.19 LBS | RESPIRATION RATE: 16 BRPM | BODY MASS INDEX: 29.7 KG/M2 | DIASTOLIC BLOOD PRESSURE: 62 MMHG | OXYGEN SATURATION: 98 %

## 2020-09-09 DIAGNOSIS — N39.0 URINARY TRACT INFECTION, SITE NOT SPECIFIED: ICD-10-CM

## 2020-09-09 LAB
BILIRUB UR QL STRIP: NEGATIVE
CLARITY UR: ABNORMAL
COLLECTION METHOD: NORMAL
GLUCOSE UR-MCNC: NEGATIVE
HCG UR QL: 0.2 EU/DL
HGB UR QL STRIP.AUTO: ABNORMAL
KETONES UR-MCNC: NEGATIVE
LEUKOCYTE ESTERASE UR QL STRIP: ABNORMAL
NITRITE UR QL STRIP: NEGATIVE
PH UR STRIP: 6.5
PROT UR STRIP-MCNC: NEGATIVE
SP GR UR STRIP: 1.02

## 2020-09-09 PROCEDURE — 99214 OFFICE O/P EST MOD 30 MIN: CPT | Mod: 25

## 2020-09-09 PROCEDURE — 81003 URINALYSIS AUTO W/O SCOPE: CPT | Mod: QW

## 2020-09-09 NOTE — HISTORY OF PRESENT ILLNESS
[FreeTextEntry8] : [facility] with nausea/malaise/ chills no urinary burning, hestiancy, no frequency\par had flank pain bilaterally.  dip in urigcare postive for leukocytes started on marcrobid , next day had fever however fever went down and felt better, still had some nausea and mild flank pain. \par culture today reveals negative urine\par still feels nauseous \par no urinary symptoms\par crampy lower abdomen, but improving\par no diarrhea or vomiting

## 2020-09-09 NOTE — ASSESSMENT
[FreeTextEntry1] : extend macrobid for 7 to 10 days\par fluids, cranberry juoice probiotics\par may add pepcid but space 5 hours from dosing so neftali not interfere with absorption of antiboitc\par overall improving , reassurance call if symptoms do not continue to improve

## 2020-09-09 NOTE — DATA REVIEWED
[FreeTextEntry1] : ua trace blood, leuks today\par culture [facility] less than 10, 000 normal urogenital cyrus

## 2020-09-09 NOTE — PHYSICAL EXAM
[No Acute Distress] : no acute distress [Well Developed] : well developed [Well Nourished] : well nourished [Well-Appearing] : well-appearing [EOMI] : extraocular movements intact [PERRL] : pupils equal round and reactive to light [No Lymphadenopathy] : no lymphadenopathy [Normal Oropharynx] : the oropharynx was normal [Thyroid Normal, No Nodules] : the thyroid was normal and there were no nodules present [Supple] : supple [Clear to Auscultation] : lungs were clear to auscultation bilaterally [No Accessory Muscle Use] : no accessory muscle use [Normal S1, S2] : normal S1 and S2 [Regular Rhythm] : with a regular rhythm [No Murmur] : no murmur heard [No Varicosities] : no varicosities [Pedal Pulses Present] : the pedal pulses are present [de-identified] : mild tenderness lower abodmen [de-identified] : flank tenderness bilaterally, left worse than right

## 2020-10-12 ENCOUNTER — APPOINTMENT (OUTPATIENT)
Dept: INTERNAL MEDICINE | Facility: CLINIC | Age: 40
End: 2020-10-12
Payer: COMMERCIAL

## 2020-10-12 DIAGNOSIS — Z23 ENCOUNTER FOR IMMUNIZATION: ICD-10-CM

## 2020-10-12 PROCEDURE — 90686 IIV4 VACC NO PRSV 0.5 ML IM: CPT

## 2020-10-12 PROCEDURE — G0008: CPT

## 2020-11-19 NOTE — BEHAVIORAL HEALTH ASSESSMENT NOTE - ORIENTED TO TIME
Hemigard Postcare Instructions: The HEMIGARD strips are to remain completely dry for at least 5-7 days. Yes

## 2020-12-09 ENCOUNTER — TRANSCRIPTION ENCOUNTER (OUTPATIENT)
Age: 40
End: 2020-12-09

## 2020-12-16 ENCOUNTER — TRANSCRIPTION ENCOUNTER (OUTPATIENT)
Age: 40
End: 2020-12-16

## 2020-12-16 PROBLEM — Z86.19 HISTORY OF CANDIDIASIS OF VAGINA: Status: RESOLVED | Noted: 2018-10-15 | Resolved: 2020-12-16

## 2020-12-23 PROBLEM — J02.0 PHARYNGITIS DUE TO STREPTOCOCCUS PYOGENES: Status: RESOLVED | Noted: 2020-02-25 | Resolved: 2020-12-23

## 2020-12-23 PROBLEM — N39.0 URINARY TRACT INFECTION, ACUTE: Status: RESOLVED | Noted: 2020-09-09 | Resolved: 2020-12-23

## 2020-12-30 ENCOUNTER — TRANSCRIPTION ENCOUNTER (OUTPATIENT)
Age: 40
End: 2020-12-30

## 2021-01-22 NOTE — ED PROVIDER NOTE - CPE EDP NEURO NORM
She is calling to let you know that she had to go to the hospital last night, to [facility] [place].  She had to go for blood in her eye .     normal...

## 2021-01-25 ENCOUNTER — LABORATORY RESULT (OUTPATIENT)
Age: 41
End: 2021-01-25

## 2021-01-25 ENCOUNTER — APPOINTMENT (OUTPATIENT)
Dept: INTERNAL MEDICINE | Facility: CLINIC | Age: 41
End: 2021-01-25
Payer: COMMERCIAL

## 2021-01-25 VITALS
SYSTOLIC BLOOD PRESSURE: 100 MMHG | DIASTOLIC BLOOD PRESSURE: 84 MMHG | HEIGHT: 67 IN | WEIGHT: 191 LBS | BODY MASS INDEX: 29.98 KG/M2 | TEMPERATURE: 98.5 F | RESPIRATION RATE: 14 BRPM | HEART RATE: 78 BPM | OXYGEN SATURATION: 98 %

## 2021-01-25 DIAGNOSIS — F41.9 ANXIETY DISORDER, UNSPECIFIED: ICD-10-CM

## 2021-01-25 PROCEDURE — 99395 PREV VISIT EST AGE 18-39: CPT | Mod: 25

## 2021-01-25 PROCEDURE — 99072 ADDL SUPL MATRL&STAF TM PHE: CPT

## 2021-01-25 PROCEDURE — G0442 ANNUAL ALCOHOL SCREEN 15 MIN: CPT

## 2021-01-25 PROCEDURE — G0444 DEPRESSION SCREEN ANNUAL: CPT

## 2021-01-25 NOTE — PLAN
[FreeTextEntry1] : Pt. requesting STD testing due to  infidelity. \par Breast ultrasound, and mammogram hx.of breast cyst right side.\par

## 2021-01-25 NOTE — HEALTH RISK ASSESSMENT
[Good] : ~his/her~  mood as  good [Yes] : Yes [2 - 3 times a week (3 pts)] : 2 - 3  times a week (3 points) [1 or 2 (0 pts)] : 1 or 2 (0 points) [Never (0 pts)] : Never (0 points) [No] : In the past 12 months have you used drugs other than those required for medical reasons? No [No falls in past year] : Patient reported no falls in the past year [0] : 2) Feeling down, depressed, or hopeless: Not at all (0) [None] : None [With Family] : lives with family [Employed] : employed [Graduate School] : graduate school [] :  [Feels Safe at Home] : Feels safe at home [Reports changes in vision] : Reports changes in vision [Safety elements used in home] : safety elements used in home [Seat Belt] :  uses seat belt [Sunscreen] : uses sunscreen [] : No [de-identified] : no [de-identified] : treadmill  [Audit-CScore] : 3 [de-identified] : regular diet healthy [RDD4Pvlmb] : 0 [Change in mental status noted] : No change in mental status noted [Language] : denies difficulty with language [Behavior] : denies difficulty with behavior [Learning/Retaining New Information] : denies difficulty learning/retaining new information [Handling Complex Tasks] : denies difficulty handling complex tasks [Reasoning] : denies difficulty with reasoning [Spatial Ability and Orientation] : denies difficulty with spatial ability and orientation [Reports changes in hearing] : Reports no changes in hearing [Reports normal functional visual acuity (ie: able to read med bottle)] : Reports poor functional visual acuity.  [Guns at Home] : no guns at home [Reports changes in dental health] : Reports no changes in dental health [Travel to Developing Areas] : does not  travel to developing areas [TB Exposure] : is not being exposed to tuberculosis [PapSmearComments] : needs obgyn [de-identified] : Dental implants [AdvancecareDate] : 01/21

## 2021-01-25 NOTE — PHYSICAL EXAM
[No Acute Distress] : no acute distress [Well Nourished] : well nourished [Well Developed] : well developed [Well-Appearing] : well-appearing [Normal Sclera/Conjunctiva] : normal sclera/conjunctiva [PERRL] : pupils equal round and reactive to light [EOMI] : extraocular movements intact [Normal Outer Ear/Nose] : the outer ears and nose were normal in appearance [Normal Oropharynx] : the oropharynx was normal [No JVD] : no jugular venous distention [No Lymphadenopathy] : no lymphadenopathy [Supple] : supple [Thyroid Normal, No Nodules] : the thyroid was normal and there were no nodules present [No Respiratory Distress] : no respiratory distress  [No Accessory Muscle Use] : no accessory muscle use [Clear to Auscultation] : lungs were clear to auscultation bilaterally [Normal Rate] : normal rate  [Regular Rhythm] : with a regular rhythm [Normal S1, S2] : normal S1 and S2 [No Murmur] : no murmur heard [No Carotid Bruits] : no carotid bruits [No Abdominal Bruit] : a ~M bruit was not heard ~T in the abdomen [No Varicosities] : no varicosities [Pedal Pulses Present] : the pedal pulses are present [No Edema] : there was no peripheral edema [No Palpable Aorta] : no palpable aorta [No Extremity Clubbing/Cyanosis] : no extremity clubbing/cyanosis [Soft] : abdomen soft [Non Tender] : non-tender [Non-distended] : non-distended [No HSM] : no HSM [Normal Bowel Sounds] : normal bowel sounds [Normal Posterior Cervical Nodes] : no posterior cervical lymphadenopathy [Normal Anterior Cervical Nodes] : no anterior cervical lymphadenopathy [No CVA Tenderness] : no CVA  tenderness [No Spinal Tenderness] : no spinal tenderness [No Joint Swelling] : no joint swelling [Grossly Normal Strength/Tone] : grossly normal strength/tone [No Rash] : no rash [Coordination Grossly Intact] : coordination grossly intact [No Focal Deficits] : no focal deficits [Normal Gait] : normal gait [Deep Tendon Reflexes (DTR)] : deep tendon reflexes were 2+ and symmetric [Normal Affect] : the affect was normal [Normal Insight/Judgement] : insight and judgment were intact [Examination Of The Breasts] : a normal appearance [Normal] : normal [No Masses] : no breast masses were palpable

## 2021-01-27 LAB
25(OH)D3 SERPL-MCNC: 20.3 NG/ML
ALBUMIN SERPL ELPH-MCNC: 4.3 G/DL
ALP BLD-CCNC: 44 U/L
ALT SERPL-CCNC: 10 U/L
ANION GAP SERPL CALC-SCNC: 15 MMOL/L
AST SERPL-CCNC: 12 U/L
BASOPHILS # BLD AUTO: 0.06 K/UL
BASOPHILS NFR BLD AUTO: 0.6 %
BILIRUB SERPL-MCNC: 0.3 MG/DL
BUN SERPL-MCNC: 17 MG/DL
C TRACH RRNA SPEC QL NAA+PROBE: NOT DETECTED
CALCIUM SERPL-MCNC: 9.1 MG/DL
CHLORIDE SERPL-SCNC: 104 MMOL/L
CHOLEST SERPL-MCNC: 150 MG/DL
CO2 SERPL-SCNC: 20 MMOL/L
CREAT SERPL-MCNC: 0.77 MG/DL
EOSINOPHIL # BLD AUTO: 0.09 K/UL
EOSINOPHIL NFR BLD AUTO: 0.9 %
ESTIMATED AVERAGE GLUCOSE: 103 MG/DL
GLUCOSE SERPL-MCNC: 87 MG/DL
HBA1C MFR BLD HPLC: 5.2 %
HBV SURFACE AG SER QL: NONREACTIVE
HCT VFR BLD CALC: 36.9 %
HCV AB SER QL: NONREACTIVE
HCV S/CO RATIO: 0.12 S/CO
HDLC SERPL-MCNC: 51 MG/DL
HGB BLD-MCNC: 12 G/DL
IMM GRANULOCYTES NFR BLD AUTO: 0.4 %
LDLC SERPL CALC-MCNC: 74 MG/DL
LYMPHOCYTES # BLD AUTO: 2.06 K/UL
LYMPHOCYTES NFR BLD AUTO: 20.7 %
MAN DIFF?: NORMAL
MCHC RBC-ENTMCNC: 29.8 PG
MCHC RBC-ENTMCNC: 32.5 GM/DL
MCV RBC AUTO: 91.6 FL
MONOCYTES # BLD AUTO: 0.54 K/UL
MONOCYTES NFR BLD AUTO: 5.4 %
N GONORRHOEA RRNA SPEC QL NAA+PROBE: NOT DETECTED
NEUTROPHILS # BLD AUTO: 7.15 K/UL
NEUTROPHILS NFR BLD AUTO: 72 %
NONHDLC SERPL-MCNC: 99 MG/DL
PLATELET # BLD AUTO: 263 K/UL
POTASSIUM SERPL-SCNC: 3.9 MMOL/L
PROT SERPL-MCNC: 7.3 G/DL
RBC # BLD: 4.03 M/UL
RBC # FLD: 12.8 %
SARS-COV-2 IGG SERPL IA-ACNC: 0.09 INDEX
SARS-COV-2 IGG SERPL QL IA: NEGATIVE
SODIUM SERPL-SCNC: 139 MMOL/L
SOURCE AMPLIFICATION: NORMAL
T PALLIDUM AB SER QL IA: NEGATIVE
TRIGL SERPL-MCNC: 122 MG/DL
TSH SERPL-ACNC: 1.74 UIU/ML
WBC # FLD AUTO: 9.94 K/UL

## 2021-02-01 LAB — T4 FREE SERPL DIALY-MCNC: 0.91 NG/DL

## 2021-02-08 DIAGNOSIS — Z20.2 CONTACT WITH AND (SUSPECTED) EXPOSURE TO INFECTIONS WITH A PREDOMINANTLY SEXUAL MODE OF TRANSMISSION: ICD-10-CM

## 2021-03-08 ENCOUNTER — APPOINTMENT (OUTPATIENT)
Dept: MAMMOGRAPHY | Facility: HOSPITAL | Age: 41
End: 2021-03-08

## 2021-03-08 ENCOUNTER — NON-APPOINTMENT (OUTPATIENT)
Age: 41
End: 2021-03-08

## 2021-03-08 ENCOUNTER — APPOINTMENT (OUTPATIENT)
Dept: ULTRASOUND IMAGING | Facility: HOSPITAL | Age: 41
End: 2021-03-08

## 2021-04-26 ENCOUNTER — NON-APPOINTMENT (OUTPATIENT)
Age: 41
End: 2021-04-26

## 2021-04-26 ENCOUNTER — APPOINTMENT (OUTPATIENT)
Dept: INTERNAL MEDICINE | Facility: CLINIC | Age: 41
End: 2021-04-26
Payer: COMMERCIAL

## 2021-04-26 VITALS
HEIGHT: 67 IN | DIASTOLIC BLOOD PRESSURE: 64 MMHG | WEIGHT: 194 LBS | HEART RATE: 69 BPM | TEMPERATURE: 98.4 F | SYSTOLIC BLOOD PRESSURE: 96 MMHG | BODY MASS INDEX: 30.45 KG/M2 | OXYGEN SATURATION: 99 %

## 2021-04-26 PROCEDURE — 99072 ADDL SUPL MATRL&STAF TM PHE: CPT

## 2021-04-26 PROCEDURE — 99214 OFFICE O/P EST MOD 30 MIN: CPT

## 2021-04-26 RX ORDER — DOXYCYCLINE HYCLATE 100 MG/1
100 CAPSULE ORAL
Qty: 14 | Refills: 2 | Status: DISCONTINUED | COMMUNITY
Start: 2021-02-08 | End: 2021-04-26

## 2021-04-26 RX ORDER — NITROFURANTOIN (MONOHYDRATE/MACROCRYSTALS) 25; 75 MG/1; MG/1
100 CAPSULE ORAL
Qty: 10 | Refills: 0 | Status: DISCONTINUED | COMMUNITY
End: 2021-04-26

## 2021-04-26 NOTE — HISTORY OF PRESENT ILLNESS
[No Pertinent Cardiac History] : no history of aortic stenosis, atrial fibrillation, coronary artery disease, recent myocardial infarction, or implantable device/pacemaker [No Pertinent Pulmonary History] : no history of asthma, COPD, sleep apnea, or smoking [(Patient denies any chest pain, claudication, dyspnea on exertion, orthopnea, palpitations or syncope)] : Patient denies any chest pain, claudication, dyspnea on exertion, orthopnea, palpitations or syncope [Excellent (>10 METs)] : Excellent (>10 METs) [Chronic Anticoagulation] : no chronic anticoagulation [Chronic Kidney Disease] : no chronic kidney disease [FreeTextEntry1] : Liposuction [FreeTextEntry2] : 5-7-21 [FreeTextEntry3] : Jared Mnotiel

## 2021-04-27 LAB
ALBUMIN SERPL ELPH-MCNC: 4.5 G/DL
ALP BLD-CCNC: 50 U/L
ALT SERPL-CCNC: 9 U/L
ANION GAP SERPL CALC-SCNC: 9 MMOL/L
APPEARANCE: CLEAR
AST SERPL-CCNC: 14 U/L
BASOPHILS # BLD AUTO: 0.05 K/UL
BASOPHILS NFR BLD AUTO: 0.5 %
BILIRUB SERPL-MCNC: 0.2 MG/DL
BILIRUBIN URINE: NEGATIVE
BLOOD URINE: NEGATIVE
BUN SERPL-MCNC: 20 MG/DL
CALCIUM SERPL-MCNC: 9.3 MG/DL
CHLORIDE SERPL-SCNC: 103 MMOL/L
CO2 SERPL-SCNC: 26 MMOL/L
COLOR: COLORLESS
CREAT SERPL-MCNC: 0.74 MG/DL
EOSINOPHIL # BLD AUTO: 0.14 K/UL
EOSINOPHIL NFR BLD AUTO: 1.4 %
GLUCOSE QUALITATIVE U: NEGATIVE
GLUCOSE SERPL-MCNC: 99 MG/DL
HCT VFR BLD CALC: 38.4 %
HGB BLD-MCNC: 12.4 G/DL
IMM GRANULOCYTES NFR BLD AUTO: 0.4 %
KETONES URINE: NEGATIVE
LEUKOCYTE ESTERASE URINE: NEGATIVE
LYMPHOCYTES # BLD AUTO: 2.46 K/UL
LYMPHOCYTES NFR BLD AUTO: 24.9 %
MAN DIFF?: NORMAL
MCHC RBC-ENTMCNC: 29.9 PG
MCHC RBC-ENTMCNC: 32.3 GM/DL
MCV RBC AUTO: 92.5 FL
MONOCYTES # BLD AUTO: 0.62 K/UL
MONOCYTES NFR BLD AUTO: 6.3 %
NEUTROPHILS # BLD AUTO: 6.55 K/UL
NEUTROPHILS NFR BLD AUTO: 66.5 %
NITRITE URINE: NEGATIVE
PH URINE: 6
PLATELET # BLD AUTO: 292 K/UL
POTASSIUM SERPL-SCNC: 4 MMOL/L
PROT SERPL-MCNC: 7.7 G/DL
PROTEIN URINE: NEGATIVE
RBC # BLD: 4.15 M/UL
RBC # FLD: 12.4 %
SODIUM SERPL-SCNC: 138 MMOL/L
SPECIFIC GRAVITY URINE: 1.01
UROBILINOGEN URINE: NORMAL
WBC # FLD AUTO: 9.86 K/UL

## 2021-05-03 ENCOUNTER — TRANSCRIPTION ENCOUNTER (OUTPATIENT)
Age: 41
End: 2021-05-03

## 2021-09-11 ENCOUNTER — EMERGENCY (EMERGENCY)
Facility: HOSPITAL | Age: 41
LOS: 1 days | Discharge: HOME | End: 2021-09-11
Attending: EMERGENCY MEDICINE | Admitting: EMERGENCY MEDICINE
Payer: COMMERCIAL

## 2021-09-11 VITALS
RESPIRATION RATE: 16 BRPM | TEMPERATURE: 98 F | WEIGHT: 190.04 LBS | OXYGEN SATURATION: 96 % | HEIGHT: 66 IN | SYSTOLIC BLOOD PRESSURE: 114 MMHG | HEART RATE: 79 BPM | DIASTOLIC BLOOD PRESSURE: 79 MMHG

## 2021-09-11 DIAGNOSIS — M67.922 UNSPECIFIED DISORDER OF SYNOVIUM AND TENDON, LEFT UPPER ARM: Chronic | ICD-10-CM

## 2021-09-11 DIAGNOSIS — Z90.711 ACQUIRED ABSENCE OF UTERUS WITH REMAINING CERVICAL STUMP: Chronic | ICD-10-CM

## 2021-09-11 LAB
ALBUMIN SERPL ELPH-MCNC: 3.9 G/DL — SIGNIFICANT CHANGE UP (ref 3.3–5)
ALP SERPL-CCNC: 49 U/L — SIGNIFICANT CHANGE UP (ref 40–120)
ALT FLD-CCNC: 24 U/L — SIGNIFICANT CHANGE UP (ref 10–45)
ANION GAP SERPL CALC-SCNC: 7 MMOL/L — SIGNIFICANT CHANGE UP (ref 5–17)
AST SERPL-CCNC: 15 U/L — SIGNIFICANT CHANGE UP (ref 10–40)
BASOPHILS # BLD AUTO: 0 K/UL — SIGNIFICANT CHANGE UP (ref 0–0.2)
BASOPHILS NFR BLD AUTO: 0 % — SIGNIFICANT CHANGE UP (ref 0–2)
BILIRUB SERPL-MCNC: 0.6 MG/DL — SIGNIFICANT CHANGE UP (ref 0.2–1.2)
BUN SERPL-MCNC: 12 MG/DL — SIGNIFICANT CHANGE UP (ref 7–23)
CALCIUM SERPL-MCNC: 8.9 MG/DL — SIGNIFICANT CHANGE UP (ref 8.4–10.5)
CHLORIDE SERPL-SCNC: 104 MMOL/L — SIGNIFICANT CHANGE UP (ref 96–108)
CO2 SERPL-SCNC: 28 MMOL/L — SIGNIFICANT CHANGE UP (ref 22–31)
CREAT SERPL-MCNC: 0.7 MG/DL — SIGNIFICANT CHANGE UP (ref 0.5–1.3)
EOSINOPHIL # BLD AUTO: 0.09 K/UL — SIGNIFICANT CHANGE UP (ref 0–0.5)
EOSINOPHIL NFR BLD AUTO: 1 % — SIGNIFICANT CHANGE UP (ref 0–6)
GLUCOSE SERPL-MCNC: 97 MG/DL — SIGNIFICANT CHANGE UP (ref 70–99)
HCT VFR BLD CALC: 37.3 % — SIGNIFICANT CHANGE UP (ref 34.5–45)
HGB BLD-MCNC: 12.4 G/DL — SIGNIFICANT CHANGE UP (ref 11.5–15.5)
LYMPHOCYTES # BLD AUTO: 1.72 K/UL — SIGNIFICANT CHANGE UP (ref 1–3.3)
LYMPHOCYTES # BLD AUTO: 20 % — SIGNIFICANT CHANGE UP (ref 13–44)
MAGNESIUM SERPL-MCNC: 3.2 MG/DL — HIGH (ref 1.6–2.6)
MANUAL SMEAR VERIFICATION: SIGNIFICANT CHANGE UP
MCHC RBC-ENTMCNC: 29.5 PG — SIGNIFICANT CHANGE UP (ref 27–34)
MCHC RBC-ENTMCNC: 33.2 GM/DL — SIGNIFICANT CHANGE UP (ref 32–36)
MCV RBC AUTO: 88.6 FL — SIGNIFICANT CHANGE UP (ref 80–100)
MONOCYTES # BLD AUTO: 0.69 K/UL — SIGNIFICANT CHANGE UP (ref 0–0.9)
MONOCYTES NFR BLD AUTO: 8 % — SIGNIFICANT CHANGE UP (ref 2–14)
NEUTROPHILS # BLD AUTO: 5.76 K/UL — SIGNIFICANT CHANGE UP (ref 1.8–7.4)
NEUTROPHILS NFR BLD AUTO: 67 % — SIGNIFICANT CHANGE UP (ref 43–77)
NRBC # BLD: 0 /100 — SIGNIFICANT CHANGE UP (ref 0–0)
NT-PROBNP SERPL-SCNC: 50 PG/ML — SIGNIFICANT CHANGE UP (ref 0–300)
PLAT MORPH BLD: NORMAL — SIGNIFICANT CHANGE UP
PLATELET # BLD AUTO: 237 K/UL — SIGNIFICANT CHANGE UP (ref 150–400)
POTASSIUM SERPL-MCNC: 4.1 MMOL/L — SIGNIFICANT CHANGE UP (ref 3.5–5.3)
POTASSIUM SERPL-SCNC: 4.1 MMOL/L — SIGNIFICANT CHANGE UP (ref 3.5–5.3)
PROT SERPL-MCNC: 7.8 G/DL — SIGNIFICANT CHANGE UP (ref 6–8.3)
RBC # BLD: 4.21 M/UL — SIGNIFICANT CHANGE UP (ref 3.8–5.2)
RBC # FLD: 12.8 % — SIGNIFICANT CHANGE UP (ref 10.3–14.5)
RBC BLD AUTO: NORMAL — SIGNIFICANT CHANGE UP
SODIUM SERPL-SCNC: 139 MMOL/L — SIGNIFICANT CHANGE UP (ref 135–145)
TROPONIN I SERPL-MCNC: <.017 NG/ML — LOW (ref 0.02–0.06)
VARIANT LYMPHS # BLD: 4 % — SIGNIFICANT CHANGE UP (ref 0–6)
WBC # BLD: 8.6 K/UL — SIGNIFICANT CHANGE UP (ref 3.8–10.5)
WBC # FLD AUTO: 8.6 K/UL — SIGNIFICANT CHANGE UP (ref 3.8–10.5)

## 2021-09-11 PROCEDURE — 99283 EMERGENCY DEPT VISIT LOW MDM: CPT | Mod: 25

## 2021-09-11 PROCEDURE — 93010 ELECTROCARDIOGRAM REPORT: CPT

## 2021-09-11 PROCEDURE — 71045 X-RAY EXAM CHEST 1 VIEW: CPT | Mod: 26

## 2021-09-11 PROCEDURE — 83735 ASSAY OF MAGNESIUM: CPT

## 2021-09-11 PROCEDURE — 93005 ELECTROCARDIOGRAM TRACING: CPT

## 2021-09-11 PROCEDURE — 85025 COMPLETE CBC W/AUTO DIFF WBC: CPT

## 2021-09-11 PROCEDURE — 84484 ASSAY OF TROPONIN QUANT: CPT

## 2021-09-11 PROCEDURE — 83880 ASSAY OF NATRIURETIC PEPTIDE: CPT

## 2021-09-11 PROCEDURE — 80053 COMPREHEN METABOLIC PANEL: CPT

## 2021-09-11 PROCEDURE — 36415 COLL VENOUS BLD VENIPUNCTURE: CPT

## 2021-09-11 PROCEDURE — 99285 EMERGENCY DEPT VISIT HI MDM: CPT

## 2021-09-11 PROCEDURE — 71045 X-RAY EXAM CHEST 1 VIEW: CPT

## 2021-09-11 NOTE — ED ADULT NURSE NOTE - OBJECTIVE STATEMENT
Pt a&ox3 ambulatory to ED c/o intermittent chest discomfort since this morning. Pt with h/o SVT, states she was lifting boxes yesterday. No cp at this time, denies fever/chills, sob.

## 2021-09-11 NOTE — ED PROVIDER NOTE - OBJECTIVE STATEMENT
pt 40y f hx DIC and hysterectomy after delivery 2018 from possible amniotic fluid embolism, SVT with ablation p/w non radiating substernal chest pain since this morning. pt was moving furniture  PERC neg. d-dimer originally ordered because pts records states she had hx breast ca however pt states it was not malignant

## 2021-09-11 NOTE — ED PROVIDER NOTE - NSFOLLOWUPINSTRUCTIONS_ED_ALL_ED_FT
Please follow-up with your doctor(s) within the next 3 days, but see medical sooner if your symptoms persist or worsen.  Please call tomorrow for an appointment.    You were given a copy of your labs and/or imaging.  Please go-over these with your doctor(s).     If you have any worsening of symptoms or any other concerns please see your doctor or return to the ED immediately.    Please continue taking your home medications as directed.  Do not use alcohol when taking any medication (especially antibiotics, tylenol or other pain medication) unless you check with the doctor or pharmacist.    Please use 650mg tylenol (or acetaminophen) every 6 hours and 600mg motrin (or advil or ibuprofen) every 6 hours as needed for pain/discomfort/swelling.  Make sure not to use more than 3500mg in any 24 hour period.

## 2021-09-11 NOTE — ED PROVIDER NOTE - NSICDXPASTMEDICALHX_GEN_ALL_CORE_FT
PAST MEDICAL HISTORY:  H/O malignant neoplasm of nipple and areola     PTSD (post-traumatic stress disorder)     Supraventricular dysrhythmia     Vasovagal syncope

## 2021-09-11 NOTE — ED PROVIDER NOTE - PATIENT PORTAL LINK FT
You can access the FollowMyHealth Patient Portal offered by [facility] by registering at the following website: [email or web address]. By joining [employer]’s FollowMyHealth portal, you will also be able to view your health information using other applications (apps) compatible with our system.

## 2021-09-11 NOTE — ED PROVIDER NOTE - CLINICAL SUMMARY MEDICAL DECISION MAKING FREE TEXT BOX
Dr. 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 States her arms feel sore from the activity. No calf pain or swelling, no travel. On exam pt is anxious but well appearing, nad, RRR, CTAB, abdo soft/nt/nd, no pedal edema or calf ttp. Fully vaccinated against covid, no h/o covid, non smoker. PERC neg. Will check labs, CXR. reassess.

## 2021-09-11 NOTE — ED ADULT NURSE NOTE - EXTENSIONS OF SELF_ADULT
FIRST TRIMESTER AND ENDOVAGINAL PROBE OB ULTRASOUND:  03/13/2019

 

CLINICAL HISTORY:  8 weeks pregnant with vaginal bleeding.

 

FINDINGS:  Both transabdominal and endovaginal probes were utilized.  Bladder is

partly filled measuring up to 6.3 cm in length.  The uterus is anteverted and

measures 10.8 x 6.1 x 5.8 cm.  On EV probe, the endometrial stripe is seen.  It

has a thickness of up to 18 mm.  I do not see a gestational sac or a pseudo

gestational sac.  No fluid in the endometrial cavity or endocervical canal

portion of the uterus.

 

The right ovary is 2.6 x 2 x 2.7 cm.  It has a Doppler tracing with resistive

index of 0.49.

 

There is a complex mixed echo area in the left adnexa difficult to evaluate.

Loops of bowel in this region might obscure findings.  Fat from omentum or even a

dermoid could also obscure some of the findings.  No gross evidence of an ectopic

but that is not excluded.  No definitive images of the left ovary.

 

IMPRESSION:

 

1.  EV probe shows no evidence of intrauterine gestation or fluid in the

endometrial cavity.

 

2.  There is a complex echogenic area in the left adnexa without definite normal

ovary.  Whether this appearance is due to bowel gas, fat, or dermoid with fat is

uncertain.  An ectopic is not entirely excluded.  The right ovary was normal.  No

free fluid.

 

3.  Recommend followup ultrasound and HCG at clinically appropriate interval.

 

ADDENDUM: The patient returns for another attempt to visualize the left adnexa at

the request of the consulting gynecologist.  When rescanned with a transabdominal

curved array transducer, the left ovary is seen with its appearance as a complex

mass,  measuring 4.6 by 3.4 x 4.7 cm.  There are hypoechoic and hyperechoic focus

within.  I suspect a dermoid measuring 4.4 x 4.2 x 3.2 cm.  Fat and hair are

suspected within this.  Color Doppler of the left ovary showed resistive index of

0.43.  There is no torsion.

 

Impression:

 

1.  Left adnexal mass involving the ovary, likely dermoid 4.4 x 4.2 x 3.2 cm.  No

torsion.  Fat and suspected hair within.  The inability to visualize the ovary on

the initial scan is that it is displaced superiorly and not visible by

endovaginal probe.

 

 

 

 

Electronically Signed by

Papi Bills MD 03/13/2019 04:09 P None

## 2021-09-11 NOTE — ED PROVIDER NOTE - ATTENDING CONTRIBUTION TO CARE
Dr. Gil: I performed a face to face bedside interview with patient regarding history of present illness, review of symptoms and past medical history. I completed an independent physical exam.  I have discussed patient's plan of care with PA.   I agree with note as stated above, having amended the EMR as needed to reflect my findings.   This includes HISTORY OF PRESENT ILLNESS, HIV, PAST MEDICAL/SURGICAL/FAMILY/SOCIAL HISTORY, ALLERGIES AND HOME MEDICATIONS, REVIEW OF SYSTEMS, PHYSICAL EXAM, and any PROGRESS NOTES during the time I functioned as the attending physician for this patient.    see mdm

## 2021-09-12 ENCOUNTER — TRANSCRIPTION ENCOUNTER (OUTPATIENT)
Age: 41
End: 2021-09-12

## 2021-10-08 ENCOUNTER — EMERGENCY (EMERGENCY)
Facility: HOSPITAL | Age: 41
LOS: 1 days | Discharge: HOME | End: 2021-10-08
Attending: EMERGENCY MEDICINE | Admitting: INTERNAL MEDICINE
Payer: COMMERCIAL

## 2021-10-08 VITALS
DIASTOLIC BLOOD PRESSURE: 78 MMHG | WEIGHT: 184.97 LBS | HEIGHT: 66 IN | RESPIRATION RATE: 16 BRPM | HEART RATE: 74 BPM | SYSTOLIC BLOOD PRESSURE: 121 MMHG | OXYGEN SATURATION: 96 % | TEMPERATURE: 98 F

## 2021-10-08 DIAGNOSIS — M67.922 UNSPECIFIED DISORDER OF SYNOVIUM AND TENDON, LEFT UPPER ARM: Chronic | ICD-10-CM

## 2021-10-08 DIAGNOSIS — Z90.711 ACQUIRED ABSENCE OF UTERUS WITH REMAINING CERVICAL STUMP: Chronic | ICD-10-CM

## 2021-10-08 LAB
APPEARANCE UR: CLEAR — SIGNIFICANT CHANGE UP
BACTERIA # UR AUTO: ABNORMAL /HPF
BILIRUB UR-MCNC: NEGATIVE — SIGNIFICANT CHANGE UP
COLOR SPEC: YELLOW — SIGNIFICANT CHANGE UP
DIFF PNL FLD: ABNORMAL
EPI CELLS # UR: SIGNIFICANT CHANGE UP
GLUCOSE UR QL: NEGATIVE — SIGNIFICANT CHANGE UP
KETONES UR-MCNC: NEGATIVE — SIGNIFICANT CHANGE UP
LEUKOCYTE ESTERASE UR-ACNC: ABNORMAL
NITRITE UR-MCNC: NEGATIVE — SIGNIFICANT CHANGE UP
PH UR: 6 — SIGNIFICANT CHANGE UP (ref 5–8)
PROT UR-MCNC: NEGATIVE — SIGNIFICANT CHANGE UP
RBC CASTS # UR COMP ASSIST: SIGNIFICANT CHANGE UP /HPF (ref 0–4)
SP GR SPEC: 1.01 — SIGNIFICANT CHANGE UP (ref 1.01–1.02)
UROBILINOGEN FLD QL: NEGATIVE — SIGNIFICANT CHANGE UP
WBC UR QL: SIGNIFICANT CHANGE UP /HPF (ref 0–5)

## 2021-10-08 PROCEDURE — 87491 CHLMYD TRACH DNA AMP PROBE: CPT

## 2021-10-08 PROCEDURE — 99283 EMERGENCY DEPT VISIT LOW MDM: CPT

## 2021-10-08 PROCEDURE — 87086 URINE CULTURE/COLONY COUNT: CPT

## 2021-10-08 PROCEDURE — 81001 URINALYSIS AUTO W/SCOPE: CPT

## 2021-10-08 PROCEDURE — 87591 N.GONORRHOEAE DNA AMP PROB: CPT

## 2021-10-08 PROCEDURE — 99284 EMERGENCY DEPT VISIT MOD MDM: CPT

## 2021-10-08 RX ORDER — NITROFURANTOIN MACROCRYSTAL 50 MG
1 CAPSULE ORAL
Qty: 14 | Refills: 0
Start: 2021-10-08 | End: 2021-10-14

## 2021-10-08 RX ORDER — NITROFURANTOIN MACROCRYSTAL 50 MG
100 CAPSULE ORAL ONCE
Refills: 0 | Status: COMPLETED | OUTPATIENT
Start: 2021-10-08 | End: 2021-10-08

## 2021-10-08 RX ADMIN — Medication 100 MILLIGRAM(S): at 19:27

## 2021-10-08 NOTE — ED PROVIDER NOTE - NSFOLLOWUPINSTRUCTIONS_ED_ALL_ED_FT
Follow up with your PMD within 48-72 hours.   Increase your fluids.    Take Cefpodoxime 200mg 1 tab 2x/day for 10 days.   Take Pyridium 100mg every 8 hrs as needed for the urinary discomfort for 2 days- caution: this will change your urine to an orange color.   Worsening or new abdominal or back pain, fever, chills, nausea, vomiting OR ANY NEW CONCERNING SYMPTOMS return to the Emergency Department.    Urinary Tract Infection, Adult  A urinary tract infection (UTI) is an infection of any part of the urinary tract. The urinary tract includes:  The kidneys.The ureters.The bladder.The urethra.These organs make, store, and get rid of pee (urine) in the body.  What are the causes?  This is caused by germs (bacteria) in your genital area. These germs grow and cause swelling (inflammation) of your urinary tract.  What increases the risk?  You are more likely to develop this condition if:  You have a small, thin tube (catheter) to drain pee.You cannot control when you pee or poop (incontinence).You are female, and:  You use these methods to prevent pregnancy:  A medicine that kills sperm (spermicide).A device that blocks sperm (diaphragm).You have low levels of a female hormone (estrogen).You are pregnant.You have genes that add to your risk.You are sexually active.You take antibiotic medicines. You have trouble peeing because of:  A prostate that is bigger than normal, if you are male.A blockage in the part of your body that drains pee from the bladder (urethra).A kidney stone. A nerve condition that affects your bladder (neurogenic bladder).Not getting enough to drink. Not peeing often enough.You have other conditions, such as:  Diabetes. A weak disease-fighting system (immune system).Sickle cell disease. Gout. Injury of the spine.What are the signs or symptoms?  Symptoms of this condition include:  Needing to pee right away (urgently).Peeing often.Peeing small amounts often.Pain or burning when peeing.Blood in the pee.Pee that smells bad or not like normal.Trouble peeing.Pee that is cloudy.Fluid coming from the vagina, if you are female.Pain in the belly or lower back.Other symptoms include:  Throwing up (vomiting).No urge to eat.Feeling mixed up (confused).Being tired and grouchy (irritable).A fever.Watery poop (diarrhea).How is this treated?  This condition may be treated with:  Antibiotic medicine. Other medicines.Drinking enough water.Follow these instructions at home:     Medicines     Take over-the-counter and prescription medicines only as told by your doctor.If you were prescribed an antibiotic medicine, take it as told by your doctor. Do not stop taking it even if you start to feel better.General instructions     Make sure you:  Pee until your bladder is empty. Do not hold pee for a long time.Empty your bladder after sex.Wipe from front to back after pooping if you are a female. Use each tissue one time when you wipe.Drink enough fluid to keep your pee pale yellow.Keep all follow-up visits as told by your doctor. This is important.Contact a doctor if:  You do not get better after 1–2 days.Your symptoms go away and then come back.Get help right away if:  You have very bad back pain.You have very bad pain in your lower belly.You have a fever.You are sick to your stomach (nauseous).You are throwing up.Summary  A urinary tract infection (UTI) is an infection of any part of the urinary tract.This condition is caused by germs in your genital area.There are many risk factors for a UTI. These include having a small, thin tube to drain pee and not being able to control when you pee or poop.Treatment includes antibiotic medicines for germs.Drink enough fluid to keep your pee pale yellow.This information is not intended to replace advice given to you by your health care provider. Make sure you discuss any questions you have with your health care provider. Follow up with your PMD within 48-72 hours.   Increase your fluids.    Take Macrobid 100mg 1 tab 2x/day for 7 days.   Worsening or new abdominal or back pain, fever, chills, nausea, vomiting OR ANY NEW CONCERNING SYMPTOMS return to the Emergency Department.    Urinary Tract Infection, Adult  A urinary tract infection (UTI) is an infection of any part of the urinary tract. The urinary tract includes:  The kidneys.The ureters.The bladder.The urethra.These organs make, store, and get rid of pee (urine) in the body.  What are the causes?  This is caused by germs (bacteria) in your genital area. These germs grow and cause swelling (inflammation) of your urinary tract.  What increases the risk?  You are more likely to develop this condition if:  You have a small, thin tube (catheter) to drain pee.You cannot control when you pee or poop (incontinence).You are female, and:  You use these methods to prevent pregnancy:  A medicine that kills sperm (spermicide).A device that blocks sperm (diaphragm).You have low levels of a female hormone (estrogen).You are pregnant.You have genes that add to your risk.You are sexually active.You take antibiotic medicines. You have trouble peeing because of:  A prostate that is bigger than normal, if you are male.A blockage in the part of your body that drains pee from the bladder (urethra).A kidney stone. A nerve condition that affects your bladder (neurogenic bladder).Not getting enough to drink. Not peeing often enough.You have other conditions, such as:  Diabetes. A weak disease-fighting system (immune system).Sickle cell disease. Gout. Injury of the spine.What are the signs or symptoms?  Symptoms of this condition include:  Needing to pee right away (urgently).Peeing often.Peeing small amounts often.Pain or burning when peeing.Blood in the pee.Pee that smells bad or not like normal.Trouble peeing.Pee that is cloudy.Fluid coming from the vagina, if you are female.Pain in the belly or lower back.Other symptoms include:  Throwing up (vomiting).No urge to eat.Feeling mixed up (confused).Being tired and grouchy (irritable).A fever.Watery poop (diarrhea).How is this treated?  This condition may be treated with:  Antibiotic medicine. Other medicines.Drinking enough water.Follow these instructions at home:     Medicines     Take over-the-counter and prescription medicines only as told by your doctor.If you were prescribed an antibiotic medicine, take it as told by your doctor. Do not stop taking it even if you start to feel better.General instructions     Make sure you:  Pee until your bladder is empty. Do not hold pee for a long time.Empty your bladder after sex.Wipe from front to back after pooping if you are a female. Use each tissue one time when you wipe.Drink enough fluid to keep your pee pale yellow.Keep all follow-up visits as told by your doctor. This is important.Contact a doctor if:  You do not get better after 1–2 days.Your symptoms go away and then come back.Get help right away if:  You have very bad back pain.You have very bad pain in your lower belly.You have a fever.You are sick to your stomach (nauseous).You are throwing up.Summary  A urinary tract infection (UTI) is an infection of any part of the urinary tract.This condition is caused by germs in your genital area.There are many risk factors for a UTI. These include having a small, thin tube to drain pee and not being able to control when you pee or poop.Treatment includes antibiotic medicines for germs.Drink enough fluid to keep your pee pale yellow.This information is not intended to replace advice given to you by your health care provider. Make sure you discuss any questions you have with your health care provider.

## 2021-10-08 NOTE — ED PROVIDER NOTE - OBJECTIVE STATEMENT
39 y/o F with h/o Hysterectomy s/p delivery of her daughter 3 years ago has had intermittent "vaginal pain" that she has not seen her GYN for. States today she developed the sharp pain that is now more like a "pressure" in the vaginal and suprapubic region worse with urination.  Denies fever, chills, ab pain, n/v/d, dysuria, hematuria, vaginal DC, vaginal bleeding. Appears very well.

## 2021-10-08 NOTE — ED PROVIDER NOTE - CLINICAL SUMMARY MEDICAL DECISION MAKING FREE TEXT BOX
39 y/o F with h/o Hysterectomy s/p delivery of her daughter 3 years ago has had intermittent "vaginal pain" that she has not seen her GYN for. States today she developed the sharp pain that is now more like a "pressure" in the vaginal and suprapubic region worse with urination.  Denies fever, chills, ab pain, n/v/d, dysuria, hematuria, vaginal DC, vaginal bleeding. Appears very well.  Plan: check UA, Urine culture

## 2021-10-08 NOTE — ED PROVIDER NOTE - ATTENDING CONTRIBUTION TO CARE
I have personally seen and examined this patient. I have fully participated in the care of this patient. I have reviewed all pertinent clinical information, including history physical exam, plan and the PA's note and agree except as noted  39 y/o F with h/o Hysterectomy s/p delivery of her daughter 3 years ago has had intermittent "vaginal pain" that she has not seen her GYN for. States today she developed the sharp pain that is now more like a "pressure" in the vaginal and suprapubic region worse with urination.  Denies fever, chills, ab pain, n/v/d, dysuria, hematuria, vaginal DC, vaginal bleeding. Appears very well.

## 2021-10-08 NOTE — ED ADULT NURSE NOTE - OBJECTIVE STATEMENT
c/o "pelvic floor pain"  and "area between anus and vagina" pain, states she is sexually active with , denies any vaginal discharge

## 2021-10-08 NOTE — ED PROVIDER NOTE - PATIENT PORTAL LINK FT
You can access the FollowMyHealth Patient Portal offered by [facility] by registering at the following website: [email or web address]. By joining [employer]’s FollowMyHealth portal, you will also be able to view your health information using other applications (apps) compatible with our system.

## 2021-10-08 NOTE — ED ADULT TRIAGE NOTE - SPO2 (%)
96 Graft Cartilage Fenestration Text: The cartilage was fenestrated with a 2mm punch biopsy to help facilitate graft survival and healing.

## 2021-10-09 LAB
C TRACH RRNA SPEC QL NAA+PROBE: SIGNIFICANT CHANGE UP
N GONORRHOEA RRNA SPEC QL NAA+PROBE: SIGNIFICANT CHANGE UP
SPECIMEN SOURCE: SIGNIFICANT CHANGE UP

## 2021-10-10 LAB
CULTURE RESULTS: SIGNIFICANT CHANGE UP
SPECIMEN SOURCE: SIGNIFICANT CHANGE UP

## 2021-10-29 ENCOUNTER — APPOINTMENT (OUTPATIENT)
Dept: INTERNAL MEDICINE | Facility: CLINIC | Age: 41
End: 2021-10-29
Payer: COMMERCIAL

## 2021-10-29 ENCOUNTER — APPOINTMENT (OUTPATIENT)
Dept: OBGYN | Facility: CLINIC | Age: 41
End: 2021-10-29
Payer: COMMERCIAL

## 2021-10-29 VITALS
DIASTOLIC BLOOD PRESSURE: 70 MMHG | HEART RATE: 73 BPM | WEIGHT: 193 LBS | SYSTOLIC BLOOD PRESSURE: 112 MMHG | TEMPERATURE: 97 F | BODY MASS INDEX: 30.29 KG/M2 | OXYGEN SATURATION: 98 % | HEIGHT: 67 IN

## 2021-10-29 DIAGNOSIS — R10.2 PELVIC AND PERINEAL PAIN: ICD-10-CM

## 2021-10-29 DIAGNOSIS — Z01.419 ENCOUNTER FOR GYNECOLOGICAL EXAMINATION (GENERAL) (ROUTINE) W/OUT ABNORMAL FINDINGS: ICD-10-CM

## 2021-10-29 PROCEDURE — G0008: CPT

## 2021-10-29 PROCEDURE — 99395 PREV VISIT EST AGE 18-39: CPT

## 2021-10-29 PROCEDURE — 90686 IIV4 VACC NO PRSV 0.5 ML IM: CPT

## 2021-10-29 RX ORDER — CLINDAMYCIN HYDROCHLORIDE 300 MG/1
300 CAPSULE ORAL
Qty: 30 | Refills: 0 | Status: DISCONTINUED | COMMUNITY
Start: 2020-02-25 | End: 2021-10-29

## 2021-10-29 NOTE — PLAN
[FreeTextEntry1] : I spent the time noted on the day of this patient encounter preparing for, providing and documenting the above E/M service and counseling and educate patient on differential, workup, disease course, and treatment/management. Education was provided to the patient during this encounter. All questions and concerns were answered and addressed in detail.\par \par Selam Elliott MD\par

## 2021-10-29 NOTE — HISTORY OF PRESENT ILLNESS
[FreeTextEntry1] : 39 yo P1 with sp hysterectomy in 2018 presents for wwe and eval for pelvic pain. 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 Hx of breast cyst in the left breast, was getting yearly mammos since.\par \par POB:CSx1--complicated by  hemorrhage ?accreta, sp SHIRLEY/BS\par PGYN: denies pelvic infection, however was exposed to chlamydia 2 years ago, nl paps\par PMH: denies\par PSH: CHYST--cb wound infection in 2018\par Med: denies\par All: PCN, azithromycin\par SH: denies NANY\par

## 2021-11-02 DIAGNOSIS — N76.0 ACUTE VAGINITIS: ICD-10-CM

## 2021-11-02 DIAGNOSIS — B96.89 ACUTE VAGINITIS: ICD-10-CM

## 2021-11-02 DIAGNOSIS — N83.201 UNSPECIFIED OVARIAN CYST, RIGHT SIDE: ICD-10-CM

## 2021-11-02 LAB
APPEARANCE: CLEAR
BACTERIA UR CULT: NORMAL
BACTERIA: NEGATIVE
BILIRUBIN URINE: NEGATIVE
BLOOD URINE: NEGATIVE
C TRACH RRNA SPEC QL NAA+PROBE: NOT DETECTED
COLOR: YELLOW
GLUCOSE QUALITATIVE U: NEGATIVE
HPV HIGH+LOW RISK DNA PNL CVX: NOT DETECTED
HYALINE CASTS: 0 /LPF
KETONES URINE: NEGATIVE
LEUKOCYTE ESTERASE URINE: NEGATIVE
MICROSCOPIC-UA: NORMAL
N GONORRHOEA RRNA SPEC QL NAA+PROBE: NOT DETECTED
NITRITE URINE: NEGATIVE
PH URINE: 6.5
PROTEIN URINE: NORMAL
RED BLOOD CELLS URINE: 3 /HPF
SOURCE AMPLIFICATION: NORMAL
SPECIFIC GRAVITY URINE: 1.03
SQUAMOUS EPITHELIAL CELLS: 1 /HPF
UROBILINOGEN URINE: NORMAL
WHITE BLOOD CELLS URINE: 1 /HPF

## 2021-11-03 LAB
CANDIDA VAG CYTO: NOT DETECTED
G VAGINALIS+PREV SP MTYP VAG QL MICRO: DETECTED
T VAGINALIS VAG QL WET PREP: NOT DETECTED

## 2021-11-08 ENCOUNTER — TRANSCRIPTION ENCOUNTER (OUTPATIENT)
Age: 41
End: 2021-11-08

## 2021-11-08 LAB — CYTOLOGY CVX/VAG DOC THIN PREP: NORMAL

## 2021-11-24 ENCOUNTER — TRANSCRIPTION ENCOUNTER (OUTPATIENT)
Age: 41
End: 2021-11-24

## 2021-12-24 ENCOUNTER — TRANSCRIPTION ENCOUNTER (OUTPATIENT)
Age: 41
End: 2021-12-24

## 2021-12-30 NOTE — DISCHARGE NOTE ADULT - FUNCTIONAL SCREEN CURRENT LEVEL: BATHING, MLM
(4) completely dependent (0) independent Bilobed Transposition Flap Text: The defect edges were debeveled with a #15 scalpel blade.  Given the location of the defect and the proximity to free margins a bilobed transposition flap was deemed most appropriate.  Using a sterile surgical marker, an appropriate bilobe flap drawn around the defect.    The area thus outlined was incised deep to adipose tissue with a #15 scalpel blade.  The skin margins were undermined to an appropriate distance in all directions utilizing iris scissors.

## 2022-01-07 ENCOUNTER — EMERGENCY (EMERGENCY)
Facility: HOSPITAL | Age: 42
LOS: 1 days | Discharge: HOME | End: 2022-01-07
Attending: EMERGENCY MEDICINE | Admitting: EMERGENCY MEDICINE
Payer: COMMERCIAL

## 2022-01-07 VITALS
RESPIRATION RATE: 17 BRPM | SYSTOLIC BLOOD PRESSURE: 113 MMHG | OXYGEN SATURATION: 100 % | TEMPERATURE: 98 F | DIASTOLIC BLOOD PRESSURE: 77 MMHG | HEIGHT: 66 IN | HEART RATE: 74 BPM | WEIGHT: 179.9 LBS

## 2022-01-07 DIAGNOSIS — Z90.711 ACQUIRED ABSENCE OF UTERUS WITH REMAINING CERVICAL STUMP: Chronic | ICD-10-CM

## 2022-01-07 DIAGNOSIS — M67.922 UNSPECIFIED DISORDER OF SYNOVIUM AND TENDON, LEFT UPPER ARM: Chronic | ICD-10-CM

## 2022-01-07 PROCEDURE — 99284 EMERGENCY DEPT VISIT MOD MDM: CPT | Mod: 25

## 2022-01-07 PROCEDURE — 70486 CT MAXILLOFACIAL W/O DYE: CPT | Mod: 26,MA

## 2022-01-07 PROCEDURE — 99284 EMERGENCY DEPT VISIT MOD MDM: CPT

## 2022-01-07 PROCEDURE — 70486 CT MAXILLOFACIAL W/O DYE: CPT | Mod: MA

## 2022-01-07 NOTE — ED ADULT NURSE NOTE - OBJECTIVE STATEMENT
Pt a&ox3 ambulatory to ED c/o nose pain. Pt states she was outside in snow playing with her dog and her dog ran full speed and hit her face. Ice applied. Awaiting CT results.

## 2022-01-07 NOTE — ED PROVIDER NOTE - PATIENT PORTAL LINK FT
You can access the FollowMyHealth Patient Portal offered by [facility] by registering at the following website: [email or web address]. By joining [employer]s FollowMyHealth portal, you will also be able to view your health information using other applications (apps) compatible with our system.

## 2022-01-07 NOTE — ED PROVIDER NOTE - OBJECTIVE STATEMENT
42 y/o F presents for nasal injury x today s/p her 70 pound dog was running around and crashed into her face. Reports she felt a "crack" in her nose, and had mild bleeding after the injury. Denies AC use or other injuries.

## 2022-01-07 NOTE — ED PROVIDER NOTE - ATTENDING CONTRIBUTION TO CARE
Breath sounds clear and equal bilaterally.
Arsh with RONALD Estrada. 42 y/o F with hx of hysterectomy presents for nasal injury x today s/p her 70 pound dog was running around and crashed into her face. Reports she felt a "crack" in her nose, and had mild bleeding after the injury. Denies AC use or other injuries. PE as noted above. facial CT pending. pt refused pain meds. Ice pack given    255pm: CT negative for fx. will dc with instructions to the continue applying ice    I performed a face to face bedside interview with patient regarding history of present illness, review of symptoms and past medical history. I completed an independent physical exam.  I have discussed the patient's plan of care with Physician Assistant (PA). I agree with note as stated above, having amended the EMR as needed to reflect my findings.   This includes History of Present Illness, HIV, Past Medical/Surgical/Family/Social History, Allergies and Home Medications, Review of Systems, Physical Exam, and any Progress Notes during the time I functioned as the attending physician for this patient.

## 2022-01-07 NOTE — ED PROVIDER NOTE - CLINICAL SUMMARY MEDICAL DECISION MAKING FREE TEXT BOX
40 y/o F with hx of hysterectomy presents for nasal injury x today s/p her 70 pound dog was running around and crashed into her face. Reports she felt a "crack" in her nose, and had mild bleeding after the injury. Denies AC use or other injuries. PE as noted above. facial CT pending. pt refused pain meds. Ice pack given 42 y/o F with hx of hysterectomy presents for nasal injury x today s/p her 70 pound dog was running around and crashed into her face. Reports she felt a "crack" in her nose, and had mild bleeding after the injury. Denies AC use or other injuries. PE as noted above. facial CT pending. pt refused pain meds. Ice pack given    255pm: CT negative for fx. will dc with instructions to the continue applying ice

## 2022-01-07 NOTE — ED PROVIDER NOTE - NSFOLLOWUPINSTRUCTIONS_ED_ALL_ED_FT
Follow up with your primary care physician within 2-3 days. Take the copy of your test results you were given in the emergency room for your doctor to review.     Rest, Ice 15 min on/ 15 min off,  Take Ibuprofen 600mg Orally every 6 hours as needed for pain    Stay hydrated    Return to the ER if your symptoms worsen or for any other medical emergencies  *************

## 2022-01-10 DIAGNOSIS — Z20.822 CONTACT WITH AND (SUSPECTED) EXPOSURE TO COVID-19: ICD-10-CM

## 2022-01-14 LAB
COVID-19 NUCLEOCAPSID  GAM ANTIBODY INTERPRETATION: NEGATIVE
SARS-COV-2 AB SERPL QL IA: 0.08 INDEX

## 2022-01-17 ENCOUNTER — TRANSCRIPTION ENCOUNTER (OUTPATIENT)
Age: 42
End: 2022-01-17

## 2022-01-22 ENCOUNTER — TRANSCRIPTION ENCOUNTER (OUTPATIENT)
Age: 42
End: 2022-01-22

## 2022-01-24 NOTE — REVIEW OF SYSTEMS
[Negative] : Heme/Lymph Hydroxychloroquine Counseling:  I discussed with the patient that a baseline ophthalmologic exam is needed at the start of therapy and every year thereafter while on therapy. A CBC may also be warranted for monitoring.  The side effects of this medication were discussed with the patient, including but not limited to agranulocytosis, aplastic anemia, seizures, rashes, retinopathy, and liver toxicity. Patient instructed to call the office should any adverse effect occur.  The patient verbalized understanding of the proper use and possible adverse effects of Plaquenil.  All the patient's questions and concerns were addressed.

## 2022-03-04 ENCOUNTER — TRANSCRIPTION ENCOUNTER (OUTPATIENT)
Age: 42
End: 2022-03-04

## 2022-03-20 ENCOUNTER — TRANSCRIPTION ENCOUNTER (OUTPATIENT)
Age: 42
End: 2022-03-20

## 2022-04-26 ENCOUNTER — TRANSCRIPTION ENCOUNTER (OUTPATIENT)
Age: 42
End: 2022-04-26

## 2022-06-17 ENCOUNTER — APPOINTMENT (OUTPATIENT)
Dept: INTERNAL MEDICINE | Facility: CLINIC | Age: 42
End: 2022-06-17
Payer: COMMERCIAL

## 2022-06-17 VITALS
OXYGEN SATURATION: 98 % | BODY MASS INDEX: 32.02 KG/M2 | HEIGHT: 67 IN | RESPIRATION RATE: 12 BRPM | TEMPERATURE: 97.1 F | HEART RATE: 72 BPM | DIASTOLIC BLOOD PRESSURE: 60 MMHG | WEIGHT: 204 LBS | SYSTOLIC BLOOD PRESSURE: 90 MMHG

## 2022-06-17 DIAGNOSIS — N95.1 MENOPAUSAL AND FEMALE CLIMACTERIC STATES: ICD-10-CM

## 2022-06-17 DIAGNOSIS — R60.9 EDEMA, UNSPECIFIED: ICD-10-CM

## 2022-06-17 DIAGNOSIS — E66.3 OVERWEIGHT: ICD-10-CM

## 2022-06-17 PROCEDURE — 99213 OFFICE O/P EST LOW 20 MIN: CPT

## 2022-06-17 NOTE — PLAN
[FreeTextEntry1] : Urged patient to continue to take breaks and walk while she is at work.  Exercise during the weekends.  Consider elevating her legs at night if she is able to.\par Consider soy replacement therapy for hot flashes.  We will discuss other options during her annual physical if her symptoms persist.

## 2022-06-17 NOTE — HISTORY OF PRESENT ILLNESS
[FreeTextEntry8] : Patient presents with concerns over having bilateral +1 pitting edema which is intermittent.  She [place] in the city now commuting 2 hours by car each way and sitting in a office chair for most of the day.  When she exercises particularly weekends she does not notice the edema.  She has had no trauma, no salt intake, no other associated symptoms such as shortness of breath, orthopnea, chest pain.  She did go to the emergency room when she was having palpitations had a cardiac enzyme and EKG which were both normal.\par \par She also notes having early onset menopause with severe hot flashes at night.  She is reluctant to take estrogen replacement therapy or SSRIs due to potential weight gain.  She has gained weight since her last visit.

## 2022-06-28 ENCOUNTER — NON-APPOINTMENT (OUTPATIENT)
Age: 42
End: 2022-06-28

## 2022-06-30 ENCOUNTER — APPOINTMENT (OUTPATIENT)
Dept: FAMILY MEDICINE | Facility: CLINIC | Age: 42
End: 2022-06-30

## 2022-06-30 ENCOUNTER — LABORATORY RESULT (OUTPATIENT)
Age: 42
End: 2022-06-30

## 2022-06-30 VITALS
BODY MASS INDEX: 32.02 KG/M2 | DIASTOLIC BLOOD PRESSURE: 58 MMHG | HEIGHT: 67 IN | TEMPERATURE: 97 F | HEART RATE: 71 BPM | SYSTOLIC BLOOD PRESSURE: 90 MMHG | OXYGEN SATURATION: 98 % | WEIGHT: 204 LBS | RESPIRATION RATE: 12 BRPM

## 2022-06-30 DIAGNOSIS — R53.83 OTHER FATIGUE: ICD-10-CM

## 2022-06-30 PROCEDURE — 99214 OFFICE O/P EST MOD 30 MIN: CPT

## 2022-06-30 RX ORDER — METRONIDAZOLE 500 MG/1
500 TABLET ORAL TWICE DAILY
Qty: 14 | Refills: 0 | Status: DISCONTINUED | COMMUNITY
Start: 2021-11-02 | End: 2022-06-30

## 2022-06-30 NOTE — HISTORY OF PRESENT ILLNESS
[FreeTextEntry1] : fatigue, body aches  [de-identified] : 42 yo female presenting today with body aches, headache, fatigue. She has been experiencing fatigue for a while, but experienced extreme fatigue yesterday in addition to body aches, some joint discomfort (hip/shoulders). She did also have a sore throat yesterday that has since resolved. She states that she does feel better today.  \par Of note, she has been experiencing hot flashes for the past 3 weeks as well. \par She also mentions that she did pull a deer tick off her daughter at the end of May, her daughter was treated for lyme disease. 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 \par Denies rashes, fevers, chills, N/V/D.  \par She was also seen at the urgent care on 6/29, COVID/Flu negative, given 2 pills of Doxy which she has not taken yet.  \par

## 2022-06-30 NOTE — PLAN
[FreeTextEntry1] : #Fatigue \par -patient has tested negative for COVID after one day of symptoms, advised to re-check in a few days \par -due to [place] to [place] borne disease, will check tick panel  \par -lab work script provided \par -she states she will also follow up with GYN in setting of her hot flashes \par -aware to call if symptoms worsen

## 2022-07-01 DIAGNOSIS — B37.3 CANDIDIASIS OF VULVA AND VAGINA: ICD-10-CM

## 2022-07-01 LAB
25(OH)D3 SERPL-MCNC: 22 NG/ML
ALBUMIN SERPL ELPH-MCNC: 4.3 G/DL
ALP BLD-CCNC: 51 U/L
ALT SERPL-CCNC: 17 U/L
ANION GAP SERPL CALC-SCNC: 11 MMOL/L
AST SERPL-CCNC: 14 U/L
B BURGDOR AB SER-IMP: POSITIVE
B BURGDOR IGG+IGM SER QL: 1.85 INDEX
B MICROTI DNA BLD QL NAA+PROBE: NORMAL
BASOPHILS # BLD AUTO: 0.06 K/UL
BASOPHILS NFR BLD AUTO: 0.7 %
BILIRUB SERPL-MCNC: 0.4 MG/DL
BUN SERPL-MCNC: 10 MG/DL
CALCIUM SERPL-MCNC: 9.2 MG/DL
CHLORIDE SERPL-SCNC: 105 MMOL/L
CO2 SERPL-SCNC: 24 MMOL/L
CREAT SERPL-MCNC: 0.68 MG/DL
EGFR: 113 ML/MIN/1.73M2
EOSINOPHIL # BLD AUTO: 0.2 K/UL
EOSINOPHIL NFR BLD AUTO: 2.5 %
ESTIMATED AVERAGE GLUCOSE: 105 MG/DL
FOLATE SERPL-MCNC: 13.2 NG/ML
GLUCOSE SERPL-MCNC: 80 MG/DL
HBA1C MFR BLD HPLC: 5.3 %
HCT VFR BLD CALC: 38.3 %
HGB BLD-MCNC: 12.2 G/DL
IMM GRANULOCYTES NFR BLD AUTO: 0.4 %
LYMPHOCYTES # BLD AUTO: 1.47 K/UL
LYMPHOCYTES NFR BLD AUTO: 18.3 %
MAN DIFF?: NORMAL
MCHC RBC-ENTMCNC: 29.5 PG
MCHC RBC-ENTMCNC: 31.9 GM/DL
MCV RBC AUTO: 92.7 FL
MONOCYTES # BLD AUTO: 0.52 K/UL
MONOCYTES NFR BLD AUTO: 6.5 %
NEUTROPHILS # BLD AUTO: 5.77 K/UL
NEUTROPHILS NFR BLD AUTO: 71.6 %
PLATELET # BLD AUTO: 274 K/UL
POTASSIUM SERPL-SCNC: 4.7 MMOL/L
PROT SERPL-MCNC: 7.3 G/DL
RBC # BLD: 4.13 M/UL
RBC # FLD: 12.5 %
SODIUM SERPL-SCNC: 139 MMOL/L
TSH SERPL-ACNC: 0.91 UIU/ML
VIT B12 SERPL-MCNC: 441 PG/ML
WBC # FLD AUTO: 8.05 K/UL

## 2022-07-06 LAB
BABESIA ANTIBODIES, IGG: NORMAL
BABESIA ANTIBODIES, IGM: NORMAL

## 2022-07-07 LAB
A PHAGO GROEL BLD QL NAA+NON-PROBE: NEGATIVE
A PHAGOCYTOPH IGG TITR SER IF: NORMAL TITER
B BURGDOR AB SER QL IA: POSITIVE
B MICROTI IGG TITR SER: NORMAL TITER
B MIYAMOTOI GLPQ BLD QL NAA+NON-PROBE: NEGATIVE
E CANIS+EWIN GROEL BLD QL NAA+NON-PROBE: NEGATIVE
E CHAFF GROEL BLD QL NAA+NON-PROBE: NEGATIVE
E CHAFFEENSIS IGG TITR SER IF: NORMAL TITER
E MURIS EAUCL GROEL BLD QL NAA+NON-PRB: NEGATIVE
F. TULARENSIS AB, IGG: NEGATIVE
F. TULARENSIS AB, IGM: NEGATIVE
F. TULARENSIS INTERPRETATION: NORMAL

## 2022-07-08 ENCOUNTER — NON-APPOINTMENT (OUTPATIENT)
Age: 42
End: 2022-07-08

## 2022-07-08 ENCOUNTER — APPOINTMENT (OUTPATIENT)
Dept: CARDIOLOGY | Facility: CLINIC | Age: 42
End: 2022-07-08

## 2022-07-08 VITALS
OXYGEN SATURATION: 100 % | WEIGHT: 210 LBS | TEMPERATURE: 97.6 F | RESPIRATION RATE: 16 BRPM | HEIGHT: 67 IN | HEART RATE: 78 BPM | SYSTOLIC BLOOD PRESSURE: 102 MMHG | BODY MASS INDEX: 32.96 KG/M2 | DIASTOLIC BLOOD PRESSURE: 70 MMHG

## 2022-07-08 DIAGNOSIS — Z86.79 PERSONAL HISTORY OF OTHER DISEASES OF THE CIRCULATORY SYSTEM: ICD-10-CM

## 2022-07-08 DIAGNOSIS — Z00.00 ENCOUNTER FOR GENERAL ADULT MEDICAL EXAMINATION W/OUT ABNORMAL FINDINGS: ICD-10-CM

## 2022-07-08 LAB
R RICKETTSI IGG CSF-ACNC: NEGATIVE
R RICKETTSI IGM CSF-ACNC: 1.34 INDEX

## 2022-07-08 PROCEDURE — 93000 ELECTROCARDIOGRAM COMPLETE: CPT

## 2022-07-08 PROCEDURE — 99204 OFFICE O/P NEW MOD 45 MIN: CPT

## 2022-07-10 PROBLEM — Z86.79 HISTORY OF SUPRAVENTRICULAR TACHYCARDIA: Status: ACTIVE | Noted: 2022-07-10

## 2022-07-10 NOTE — ASSESSMENT
[FreeTextEntry1] : Assessment:\par Neli Self is a 41 year old obese woman, former cigarette smoker with past medical history of SVT (s/p ablation in 2009) and recently diagnosed Lyme disease presents for consultation regarding leg swelling.\par \par The patient was recently exposed to a tick, had symptoms of fatigue, body aches, chills and was found to have lyme disease and currently receiving treatment. Her leg swelling appears minimal on physical exam, is mostly present at end of day and resolves in the morning and due to her prolonged sitting is suggestive of venous insufficiency. Her dyspnea on exertion may be related to weight, but would want to rule out structural heart disease given history of SVT. Physical exam with no signs of CHF. ECG consistent with normal sinus rhythm. BP normotensive. CBC, CMP, TSH and HbA1C unremarkable. \par \par Recommendations:\par [] Leg swelling: Will check venous leg US to evaluate for DVT and venous insufficiency. In meantime, recommended patient to increase ambulation, avoid prolonged sitting and standing, wear compression stockings, keep legs elevated when sitting and to avoid high sodium foods. Will follow up echo to ensure no underlying structural heart disease. \par [] Dyspnea on exertion: Will check exercise treadmill stress test to evaluate for ischemic ST abnormalities. Will check echocardiogram to evaluate for structural heart disease. \par [] Return to office: 1 month

## 2022-07-10 NOTE — HISTORY OF PRESENT ILLNESS
[FreeTextEntry1] : Neli Self is a 41 year old obese woman, former cigarette smoker with past medical history of SVT (s/p ablation in 2009) and recently diagnosed Lyme disease presents for consultation regarding leg swelling.\par \par The patient reports that she was exposed to a tick several weeks ago, had body aches, chills, sweats and was diagnosed with Lyme disease. She reports that about 2 months ago she noticed swelling of her ankles, left more than right. The leg swelling mostly resolves when she wakes up in the morning. Has also been driving to [place] for work 2 hours in morning and 2 hours back home which is new for her. Denies any recent long plane flights.  Denies orthopnea or paroxysmal nocturnal dyspnea. Has also noticed shortness of breath on exertion which is relatively new. Denies chest discomfort. Denies palpitations or syncope. Has had increasing fatigue lately.

## 2022-07-10 NOTE — REVIEW OF SYSTEMS
[Chills] : chills [Feeling Fatigued] : feeling fatigued [Blurry Vision] : no blurred vision [Earache] : no earache [Sore Throat] : no sore throat [Dyspnea on exertion] : dyspnea during exertion [Chest Discomfort] : no chest discomfort [Lower Ext Edema] : lower extremity edema [Palpitations] : no palpitations [Orthopnea] : no orthopnea [Syncope] : no syncope [Cough] : no cough [Joint Pain] : no joint pain [Abdominal Pain] : no abdominal pain [Rash] : no rash [Dizziness] : no dizziness [Confusion] : no confusion was observed [Easy Bruising] : no tendency for easy bruising

## 2022-07-12 ENCOUNTER — APPOINTMENT (OUTPATIENT)
Dept: ULTRASOUND IMAGING | Facility: HOSPITAL | Age: 42
End: 2022-07-12

## 2022-07-13 LAB
ANAPLASMA PHAGOCYTOPHILIA IGG ANTIBODIES: NEGATIVE
ANAPLASMA PHAGOCYTOPHILIA IGM ANTIBODIES: NEGATIVE
EHRLICIA CHAFFEENIS IGG ANTIBODIES: NEGATIVE
EHRLICIA CHAFFEENIS IGM ANTIBODIES: NEGATIVE

## 2022-07-15 ENCOUNTER — APPOINTMENT (OUTPATIENT)
Dept: CARDIOLOGY | Facility: CLINIC | Age: 42
End: 2022-07-15

## 2022-07-15 PROCEDURE — 93015 CV STRESS TEST SUPVJ I&R: CPT

## 2022-07-15 PROCEDURE — 93306 TTE W/DOPPLER COMPLETE: CPT

## 2022-08-05 ENCOUNTER — NON-APPOINTMENT (OUTPATIENT)
Age: 42
End: 2022-08-05

## 2022-08-05 ENCOUNTER — APPOINTMENT (OUTPATIENT)
Dept: CARDIOLOGY | Facility: CLINIC | Age: 42
End: 2022-08-05

## 2022-08-05 VITALS
SYSTOLIC BLOOD PRESSURE: 104 MMHG | OXYGEN SATURATION: 99 % | DIASTOLIC BLOOD PRESSURE: 69 MMHG | BODY MASS INDEX: 32.96 KG/M2 | HEIGHT: 67 IN | RESPIRATION RATE: 20 BRPM | HEART RATE: 70 BPM | WEIGHT: 210 LBS | TEMPERATURE: 96.5 F

## 2022-08-05 DIAGNOSIS — A69.20 LYME DISEASE, UNSPECIFIED: ICD-10-CM

## 2022-08-05 DIAGNOSIS — M79.89 OTHER SPECIFIED SOFT TISSUE DISORDERS: ICD-10-CM

## 2022-08-05 DIAGNOSIS — R06.09 OTHER FORMS OF DYSPNEA: ICD-10-CM

## 2022-08-05 PROCEDURE — 99214 OFFICE O/P EST MOD 30 MIN: CPT

## 2022-08-05 PROCEDURE — 93000 ELECTROCARDIOGRAM COMPLETE: CPT

## 2022-08-07 PROBLEM — R06.09 DYSPNEA ON EXERTION: Status: ACTIVE | Noted: 2022-07-08

## 2022-08-07 PROBLEM — M79.89 LEG SWELLING: Status: ACTIVE | Noted: 2022-07-08

## 2022-08-07 PROBLEM — A69.20 LYME DISEASE: Status: ACTIVE | Noted: 2022-07-01

## 2022-08-07 NOTE — HISTORY OF PRESENT ILLNESS
[FreeTextEntry1] : Neli Self is a 41 year old obese woman, former cigarette smoker with past medical history of SVT (s/p ablation in 2009) and recently diagnosed Lyme disease presents for follow up visit regarding test results for leg edema.\par \par Since her last visit and completing her antibiotic course for Lyme disease she reports that her leg swelling has resolved. Has no complaints, denies leg swelling, chest discomfort or shortness of breath. Denies palpitations or dizziness.

## 2022-08-07 NOTE — PHYSICAL EXAM
[Normal Gait] : normal gait [No Edema] : no edema [Normal] : no rash, no skin lesions [de-identified] : young woman, obese, no acute distress [de-identified] : supple [de-identified] : JVP ~ 7 cm H20, RRR, s1, s2, no murmurs/rubs [de-identified] : unlabored respirations, clear lung fields  [de-identified] : obese

## 2022-08-07 NOTE — ASSESSMENT
[FreeTextEntry1] : Assessment:\par Neli Self is a 41 year old obese woman, former cigarette smoker with past medical history of SVT (s/p ablation in 2009) and recently diagnosed Lyme disease presents for follow up visit regarding test results for leg edema.\par \par Since her last visit she reports that after completing her antibiotic course for Lyme disease her symptoms have all resolved, denies any leg swelling. Denies angina, dyspnea or palpitations. ECG today consistent with normal sinus rhythm. BP normotensive and physical exam unremarkable. Recent labs including CBC, CMP, TSH and HbA1C unremarkable. Echocardiogram consistent with normal LV systolic function, LVEF 60-65%, mild LVH, normal RV size and function, possible artifact vs coumadin ridge along left atrial wall, no significant valvular disease. Exercise treadmill stress test somewhat limited by artifact during peak exercise, otherwise no ischemic ST abnormalities (92% MPHR, 10 METs). Bilateral lower extremity venous US was negative for DVT. Overall, the patient appears to be at low cardiovascular risk and her leg swelling was likely secondary to lyme disease. \par \par Recommendations:\par [] Leg swelling: Resolved, likely was secondary to Lyme disease given the unremarkable workup as indicated above. Patient to monitor if symptoms recur. \par [] Dyspnea on exertion: Symptoms resolved. Exercise treadmill stress test with no signs of ischemic ST abnormalities and echocardiogram with no structural heart disease. \par [] Return to office: as needed, otherwise the patient should follow up with her PCP

## 2022-08-07 NOTE — REVIEW OF SYSTEMS
[Fever] : no fever [Headache] : no headache [Chills] : no chills [Feeling Fatigued] : not feeling fatigued [Blurry Vision] : no blurred vision [Earache] : no earache [Sore Throat] : no sore throat [SOB] : no shortness of breath [Chest Discomfort] : no chest discomfort [Lower Ext Edema] : no extremity edema [Palpitations] : no palpitations [Orthopnea] : no orthopnea [Syncope] : no syncope [Cough] : no cough [Abdominal Pain] : no abdominal pain [Joint Pain] : no joint pain [Rash] : no rash [Dizziness] : no dizziness [Confusion] : no confusion was observed [Easy Bruising] : no tendency for easy bruising

## 2022-08-07 NOTE — CARDIOLOGY SUMMARY
[de-identified] : ECG (7/8/22): normal sinus rhythm \par ECG (8/5/22): normal sinus rhythm  [de-identified] : Exercise Treadmill Stress Test (7/2022): Limited due to artifact and movement during peak exercise, otherwise no ischemic ST abnormalities (92% MPHR, 10 METs). No chest pain.  [de-identified] : TTE (7/2022): LVEF 60-65%, mild LVH. Normal RV size and function. Normal biatrial sizes. Possible artifact vs coumadin ridge noted along left atrial wall. Mild MR. Mild TR. No aortic valve stenosis. No pericardial effusion.  [de-identified] : Leg venous duplex (7/2022): No evidence for DVT in the right and left lower extremities

## 2022-09-09 ENCOUNTER — APPOINTMENT (OUTPATIENT)
Dept: OBGYN | Facility: CLINIC | Age: 42
End: 2022-09-09

## 2022-09-09 VITALS
BODY MASS INDEX: 31.39 KG/M2 | WEIGHT: 200 LBS | SYSTOLIC BLOOD PRESSURE: 110 MMHG | TEMPERATURE: 97.2 F | HEART RATE: 63 BPM | OXYGEN SATURATION: 99 % | DIASTOLIC BLOOD PRESSURE: 70 MMHG | HEIGHT: 67 IN

## 2022-09-09 DIAGNOSIS — Z11.3 ENCOUNTER FOR SCREENING FOR INFECTIONS WITH A PREDOMINANTLY SEXUAL MODE OF TRANSMISSION: ICD-10-CM

## 2022-09-09 PROCEDURE — 99213 OFFICE O/P EST LOW 20 MIN: CPT

## 2022-09-09 NOTE — PLAN
[FreeTextEntry1] : \par \par I spent the time noted on the day of this patient encounter preparing for, providing and documenting the above service. I have  counseled and educated the patient on the differential, workup, disease course, and treatment/management plan. Education was provided to the patient during this encounter. All questions and concerns were answered and addressed in detail.\par \par Selam Elliott MD\par

## 2022-09-26 LAB
A VAGINAE DNA VAG QL NAA+PROBE: NORMAL
BVAB2 DNA VAG QL NAA+PROBE: NORMAL
C KRUSEI DNA VAG QL NAA+PROBE: NEGATIVE
C TRACH RRNA SPEC QL NAA+PROBE: NEGATIVE
HBV SURFACE AG SER QL: NONREACTIVE
HCG UR QL: NEGATIVE
HCV RNA SERPL NAA+PROBE-LOG IU: NOT DETECTED LOGIU/ML
HEPC RNA INTERP: NOT DETECTED
HIV1+2 AB SPEC QL IA.RAPID: NONREACTIVE
MEGA1 DNA VAG QL NAA+PROBE: NORMAL
N GONORRHOEA RRNA SPEC QL NAA+PROBE: NEGATIVE
QUALITY CONTROL: YES
T PALLIDUM AB SER QL IA: NEGATIVE
T VAGINALIS RRNA SPEC QL NAA+PROBE: NEGATIVE

## 2022-10-10 ENCOUNTER — NON-APPOINTMENT (OUTPATIENT)
Age: 42
End: 2022-10-10

## 2022-10-28 ENCOUNTER — APPOINTMENT (OUTPATIENT)
Dept: OBGYN | Facility: CLINIC | Age: 42
End: 2022-10-28

## 2022-10-28 VITALS
BODY MASS INDEX: 31.39 KG/M2 | DIASTOLIC BLOOD PRESSURE: 69 MMHG | HEIGHT: 67 IN | HEART RATE: 67 BPM | RESPIRATION RATE: 14 BRPM | WEIGHT: 200 LBS | TEMPERATURE: 98 F | SYSTOLIC BLOOD PRESSURE: 110 MMHG | OXYGEN SATURATION: 99 %

## 2022-10-28 PROCEDURE — 99396 PREV VISIT EST AGE 40-64: CPT

## 2022-11-06 LAB
C TRACH RRNA SPEC QL NAA+PROBE: NOT DETECTED
CANDIDA VAG CYTO: NOT DETECTED
CYTOLOGY CVX/VAG DOC THIN PREP: NORMAL
G VAGINALIS+PREV SP MTYP VAG QL MICRO: NOT DETECTED
HPV HIGH+LOW RISK DNA PNL CVX: NOT DETECTED
N GONORRHOEA RRNA SPEC QL NAA+PROBE: NOT DETECTED
SOURCE AMPLIFICATION: NORMAL
T VAGINALIS VAG QL WET PREP: NOT DETECTED

## 2022-11-21 ENCOUNTER — NON-APPOINTMENT (OUTPATIENT)
Age: 42
End: 2022-11-21

## 2022-12-02 ENCOUNTER — APPOINTMENT (OUTPATIENT)
Dept: INTERNAL MEDICINE | Facility: CLINIC | Age: 42
End: 2022-12-02

## 2022-12-02 PROCEDURE — 90686 IIV4 VACC NO PRSV 0.5 ML IM: CPT

## 2022-12-02 PROCEDURE — G0008: CPT

## 2022-12-02 RX ORDER — DOXYCYCLINE HYCLATE 100 MG/1
100 TABLET ORAL TWICE DAILY
Qty: 42 | Refills: 0 | Status: COMPLETED | COMMUNITY
Start: 2022-07-01 | End: 2022-12-02

## 2022-12-02 RX ORDER — FLUCONAZOLE 150 MG/1
150 TABLET ORAL
Qty: 2 | Refills: 0 | Status: COMPLETED | COMMUNITY
Start: 2022-07-01 | End: 2022-12-02

## 2023-01-29 ENCOUNTER — NON-APPOINTMENT (OUTPATIENT)
Age: 43
End: 2023-01-29

## 2023-01-30 ENCOUNTER — NON-APPOINTMENT (OUTPATIENT)
Age: 43
End: 2023-01-30

## 2023-02-01 ENCOUNTER — APPOINTMENT (OUTPATIENT)
Dept: FAMILY MEDICINE | Facility: CLINIC | Age: 43
End: 2023-02-01
Payer: COMMERCIAL

## 2023-02-01 ENCOUNTER — NON-APPOINTMENT (OUTPATIENT)
Age: 43
End: 2023-02-01

## 2023-02-01 VITALS
BODY MASS INDEX: 40.04 KG/M2 | OXYGEN SATURATION: 98 % | RESPIRATION RATE: 16 BRPM | HEART RATE: 79 BPM | WEIGHT: 205 LBS | TEMPERATURE: 98.4 F | SYSTOLIC BLOOD PRESSURE: 115 MMHG | DIASTOLIC BLOOD PRESSURE: 81 MMHG

## 2023-02-01 PROCEDURE — 99212 OFFICE O/P EST SF 10 MIN: CPT

## 2023-02-02 ENCOUNTER — EMERGENCY (EMERGENCY)
Facility: HOSPITAL | Age: 43
LOS: 1 days | Discharge: HOME | End: 2023-02-02
Attending: EMERGENCY MEDICINE | Admitting: EMERGENCY MEDICINE
Payer: COMMERCIAL

## 2023-02-02 VITALS
HEART RATE: 87 BPM | DIASTOLIC BLOOD PRESSURE: 79 MMHG | SYSTOLIC BLOOD PRESSURE: 108 MMHG | OXYGEN SATURATION: 98 % | RESPIRATION RATE: 18 BRPM

## 2023-02-02 VITALS
DIASTOLIC BLOOD PRESSURE: 83 MMHG | HEART RATE: 81 BPM | TEMPERATURE: 100 F | OXYGEN SATURATION: 98 % | SYSTOLIC BLOOD PRESSURE: 126 MMHG | HEIGHT: 67 IN | RESPIRATION RATE: 18 BRPM | WEIGHT: 205.03 LBS

## 2023-02-02 DIAGNOSIS — Z90.711 ACQUIRED ABSENCE OF UTERUS WITH REMAINING CERVICAL STUMP: Chronic | ICD-10-CM

## 2023-02-02 DIAGNOSIS — M67.922 UNSPECIFIED DISORDER OF SYNOVIUM AND TENDON, LEFT UPPER ARM: Chronic | ICD-10-CM

## 2023-02-02 LAB
ANION GAP SERPL CALC-SCNC: 5 MMOL/L — SIGNIFICANT CHANGE UP (ref 5–17)
BASOPHILS # BLD AUTO: 0.05 K/UL — SIGNIFICANT CHANGE UP (ref 0–0.2)
BASOPHILS NFR BLD AUTO: 0.4 % — SIGNIFICANT CHANGE UP (ref 0–2)
BUN SERPL-MCNC: 8 MG/DL — SIGNIFICANT CHANGE UP (ref 7–23)
CALCIUM SERPL-MCNC: 9.1 MG/DL — SIGNIFICANT CHANGE UP (ref 8.4–10.5)
CHLORIDE SERPL-SCNC: 104 MMOL/L — SIGNIFICANT CHANGE UP (ref 96–108)
CO2 SERPL-SCNC: 27 MMOL/L — SIGNIFICANT CHANGE UP (ref 22–31)
CREAT SERPL-MCNC: 0.78 MG/DL — SIGNIFICANT CHANGE UP (ref 0.5–1.3)
EGFR: 98 ML/MIN/1.73M2 — SIGNIFICANT CHANGE UP
EOSINOPHIL # BLD AUTO: 0.02 K/UL — SIGNIFICANT CHANGE UP (ref 0–0.5)
EOSINOPHIL NFR BLD AUTO: 0.2 % — SIGNIFICANT CHANGE UP (ref 0–6)
GLUCOSE SERPL-MCNC: 113 MG/DL — HIGH (ref 70–99)
HCT VFR BLD CALC: 37 % — SIGNIFICANT CHANGE UP (ref 34.5–45)
HGB BLD-MCNC: 12.2 G/DL — SIGNIFICANT CHANGE UP (ref 11.5–15.5)
IMM GRANULOCYTES NFR BLD AUTO: 0.4 % — SIGNIFICANT CHANGE UP (ref 0–0.9)
LYMPHOCYTES # BLD AUTO: 1.86 K/UL — SIGNIFICANT CHANGE UP (ref 1–3.3)
LYMPHOCYTES # BLD AUTO: 15.1 % — SIGNIFICANT CHANGE UP (ref 13–44)
MCHC RBC-ENTMCNC: 29.8 PG — SIGNIFICANT CHANGE UP (ref 27–34)
MCHC RBC-ENTMCNC: 33 GM/DL — SIGNIFICANT CHANGE UP (ref 32–36)
MCV RBC AUTO: 90.5 FL — SIGNIFICANT CHANGE UP (ref 80–100)
MONOCYTES # BLD AUTO: 0.74 K/UL — SIGNIFICANT CHANGE UP (ref 0–0.9)
MONOCYTES NFR BLD AUTO: 6 % — SIGNIFICANT CHANGE UP (ref 2–14)
NEUTROPHILS # BLD AUTO: 9.61 K/UL — HIGH (ref 1.8–7.4)
NEUTROPHILS NFR BLD AUTO: 77.9 % — HIGH (ref 43–77)
NRBC # BLD: 0 /100 WBCS — SIGNIFICANT CHANGE UP (ref 0–0)
PLATELET # BLD AUTO: 292 K/UL — SIGNIFICANT CHANGE UP (ref 150–400)
POTASSIUM SERPL-MCNC: 3.8 MMOL/L — SIGNIFICANT CHANGE UP (ref 3.5–5.3)
POTASSIUM SERPL-SCNC: 3.8 MMOL/L — SIGNIFICANT CHANGE UP (ref 3.5–5.3)
RBC # BLD: 4.09 M/UL — SIGNIFICANT CHANGE UP (ref 3.8–5.2)
RBC # FLD: 12.7 % — SIGNIFICANT CHANGE UP (ref 10.3–14.5)
SODIUM SERPL-SCNC: 136 MMOL/L — SIGNIFICANT CHANGE UP (ref 135–145)
WBC # BLD: 12.33 K/UL — HIGH (ref 3.8–10.5)
WBC # FLD AUTO: 12.33 K/UL — HIGH (ref 3.8–10.5)

## 2023-02-02 PROCEDURE — 80048 BASIC METABOLIC PNL TOTAL CA: CPT

## 2023-02-02 PROCEDURE — 70490 CT SOFT TISSUE NECK W/O DYE: CPT | Mod: 26,MA

## 2023-02-02 PROCEDURE — 85025 COMPLETE CBC W/AUTO DIFF WBC: CPT

## 2023-02-02 PROCEDURE — 96374 THER/PROPH/DIAG INJ IV PUSH: CPT

## 2023-02-02 PROCEDURE — 99284 EMERGENCY DEPT VISIT MOD MDM: CPT | Mod: 25

## 2023-02-02 PROCEDURE — 99284 EMERGENCY DEPT VISIT MOD MDM: CPT

## 2023-02-02 PROCEDURE — 36415 COLL VENOUS BLD VENIPUNCTURE: CPT

## 2023-02-02 PROCEDURE — 70490 CT SOFT TISSUE NECK W/O DYE: CPT | Mod: MA

## 2023-02-02 PROCEDURE — 96375 TX/PRO/DX INJ NEW DRUG ADDON: CPT

## 2023-02-02 RX ORDER — DEXAMETHASONE 0.5 MG/5ML
6 ELIXIR ORAL ONCE
Refills: 0 | Status: COMPLETED | OUTPATIENT
Start: 2023-02-02 | End: 2023-02-02

## 2023-02-02 RX ORDER — KETOROLAC TROMETHAMINE 30 MG/ML
30 SYRINGE (ML) INJECTION ONCE
Refills: 0 | Status: DISCONTINUED | OUTPATIENT
Start: 2023-02-02 | End: 2023-02-02

## 2023-02-02 RX ORDER — SODIUM CHLORIDE 9 MG/ML
1000 INJECTION INTRAMUSCULAR; INTRAVENOUS; SUBCUTANEOUS ONCE
Refills: 0 | Status: COMPLETED | OUTPATIENT
Start: 2023-02-02 | End: 2023-02-02

## 2023-02-02 RX ADMIN — Medication 30 MILLIGRAM(S): at 04:09

## 2023-02-02 RX ADMIN — Medication 6 MILLIGRAM(S): at 04:09

## 2023-02-02 RX ADMIN — SODIUM CHLORIDE 2000 MILLILITER(S): 9 INJECTION INTRAMUSCULAR; INTRAVENOUS; SUBCUTANEOUS at 04:09

## 2023-02-02 RX ADMIN — Medication 30 MILLIGRAM(S): at 04:24

## 2023-02-02 NOTE — ED PROVIDER NOTE - OBJECTIVE STATEMENT
43 y/o F with no sig PMHX presents to Ed c/o pain in throat . dull . constant severe , since Sunday, c/o tactile fever.  No difficulty eating or drinking, she was tested for strep and was negative, urgent care also started her on oral prednisone she took 40 mg today with some relief but few hours ago she  noticed her left side neck and face was swollen. She also complaints ot lost voice

## 2023-02-02 NOTE — ED ADULT NURSE NOTE - OBJECTIVE STATEMENT
pt. c/o fever, cough  and sore throat since Sunday. can't eat or swallow. PMD send her here for r/o tonsil abscess, pt. on Prednisol 40 mg. pt denies chest pain or SOB. safety maintained.

## 2023-02-02 NOTE — ED PROVIDER NOTE - NSFOLLOWUPINSTRUCTIONS_ED_ALL_ED_FT
Viral Respiratory Infection    A viral respiratory infection is an illness that affects parts of the body used for breathing, like the lungs, nose, and throat. It is caused by a germ called a virus. Symptoms can include runny nose, coughing, sneezing, fatigue, body aches, sore throat, fever, or headache. Over the counter medicine can be used to manage the symptoms but the infection typically goes away on its own in 5 to 10 days.   drink lots of liquids and rest    SEEK IMMEDIATE MEDICAL CARE IF YOU HAVE ANY OF THE FOLLOWING SYMPTOMS: shortness of breath, chest pain, fever over 10 days, or lightheadedness/dizziness.

## 2023-02-02 NOTE — ED PROVIDER NOTE - PHYSICAL EXAMINATION
General:     NAD, well-nourished, well-appearing  Head:     NC/AT, EOMI, oral mucosa moist  minimal pharyngeal erythema, no exudate, no stridor. no drooling air way open and patent  Neck:     supple  Lungs:     CTA b/l, no w/r/r  CVS:     S1S2, RRR, no m/g/r  Abd:     +BS, s/nt/nd, no organomegaly  Ext:    2+ radial and pedal pulses, no c/c/e  Neuro: grossly intact

## 2023-02-02 NOTE — ED ADULT TRIAGE NOTE - CHIEF COMPLAINT QUOTE
pt. c/o fever , cough  and sore throat since Sunday.  can't eat or swallow. PMD send her here for r/o tonsil abscess, pt. on Prednisol 40 mg.

## 2023-02-02 NOTE — ED PROVIDER NOTE - PATIENT PORTAL LINK FT
You can access the FollowMyHealth Patient Portal offered by [facility] by registering at the following website: [email or web address]. By joining [facility]’s FollowMyHealth portal, you will also be able to view your health information using other applications (apps) compatible with our system.

## 2023-02-02 NOTE — ED PROVIDER NOTE - CLINICAL SUMMARY MEDICAL DECISION MAKING FREE TEXT BOX
41 y/o F with no sig PMHX presents to Ed c/o pain in throat . dull . constant severe , since Sunday, c/o tactile fever.  No difficulty eating or drinking, she was tested for strep and was negative, urgent care also started her on oral prednisone she took 40 mg today with some relief but few hours ago she  noticed her left side neck and face was swollen. She also complaints ot lost voice  On exam has mild erythema of pharynx. no exudate . Ct scan of neck was normal .minimal swelling of left side neck, given Iv decadron and Toradol and

## 2023-02-03 ENCOUNTER — APPOINTMENT (OUTPATIENT)
Dept: INTERNAL MEDICINE | Facility: CLINIC | Age: 43
End: 2023-02-03
Payer: COMMERCIAL

## 2023-02-03 VITALS
BODY MASS INDEX: 32.18 KG/M2 | SYSTOLIC BLOOD PRESSURE: 90 MMHG | WEIGHT: 205 LBS | TEMPERATURE: 97.4 F | DIASTOLIC BLOOD PRESSURE: 63 MMHG | OXYGEN SATURATION: 97 % | HEIGHT: 67 IN | RESPIRATION RATE: 14 BRPM | HEART RATE: 76 BPM

## 2023-02-03 DIAGNOSIS — H10.9 UNSPECIFIED CONJUNCTIVITIS: ICD-10-CM

## 2023-02-03 PROCEDURE — 99214 OFFICE O/P EST MOD 30 MIN: CPT | Mod: GC

## 2023-02-03 RX ORDER — METHYLPREDNISOLONE 4 MG/1
4 TABLET ORAL
Qty: 1 | Refills: 3 | Status: ACTIVE | COMMUNITY
Start: 2023-02-03 | End: 1900-01-01

## 2023-02-03 RX ORDER — TOBRAMYCIN AND DEXAMETHASONE 3; 1 MG/ML; MG/ML
0.3-0.1 SUSPENSION/ DROPS OPHTHALMIC 4 TIMES DAILY
Qty: 1 | Refills: 3 | Status: ACTIVE | COMMUNITY
Start: 2023-02-03 | End: 1900-01-01

## 2023-02-03 NOTE — PLAN
[FreeTextEntry1] : Will prescribe Levaquin and Kar.AC for sleep at night.\par ENT eval.\par Will discuss use of steroids with ENT provider.

## 2023-02-03 NOTE — HISTORY OF PRESENT ILLNESS
[FreeTextEntry8] : Patient presents with continued sore throat pain, difficulty swallowing and swollen lymph nodes.  She has been to urgent care twice this week, swabbed negative for COVID and for strep.  She has gone twice to [facility] for which she had a negative evaluation.  She then went to the emergency room 2 days ago, CT of the neck was negative per patient.  She also was complaining of nasal stuffiness, postnasal drip.  She has felt better when she has taken 40 mg of prednisone and also when she received IV Solu-Medrol in the emergency room.  Symptoms recurred shortly after stopping the steroids

## 2023-02-09 ENCOUNTER — APPOINTMENT (OUTPATIENT)
Dept: OTOLARYNGOLOGY | Facility: CLINIC | Age: 43
End: 2023-02-09
Payer: COMMERCIAL

## 2023-02-09 DIAGNOSIS — J02.9 ACUTE PHARYNGITIS, UNSPECIFIED: ICD-10-CM

## 2023-02-09 DIAGNOSIS — J06.9 ACUTE UPPER RESPIRATORY INFECTION, UNSPECIFIED: ICD-10-CM

## 2023-02-09 PROCEDURE — 31575 DIAGNOSTIC LARYNGOSCOPY: CPT

## 2023-02-09 PROCEDURE — 99243 OFF/OP CNSLTJ NEW/EST LOW 30: CPT | Mod: 25

## 2023-02-09 NOTE — PHYSICAL EXAM
[Nasal Endoscopy Performed] : nasal endoscopy was performed, see procedure section for findings [] : septum deviated to the left [Midline] : trachea located in midline position [Normal] : no rashes [de-identified] : clear mucous secretions present

## 2023-02-09 NOTE — REVIEW OF SYSTEMS
[Negative] : Heme/Lymph [Hoarseness] : no hoarseness [Throat Clearing] : no throat clearing [Throat Pain] : no throat pain [Throat Dryness] : no throat dryness [Throat Itching] : no throat itching [de-identified] : residual cough

## 2023-02-09 NOTE — ASSESSMENT
[FreeTextEntry1] : Instructed patient to continue and finish antibiotic course. \par Follow up PRN.

## 2023-02-09 NOTE — HISTORY OF PRESENT ILLNESS
[de-identified] : 41 yo female presents today for follow up on sore throat. Pt.was Dr. Lozano's patient and was seen and scoped by Stanton Anthony PA-C on Friday 2/3/23. States that she feels much better since Friday, stating that her congestion, voice hoarseness, trouble swallowing and throat pain has improved significantly. States that the only symptom she has left is a residual cough. States that she also had pink eye last week, but has now improved. Denies HA, dizziness, ear drainage, ear pain, eye discharge, eye pain, nasal drip, breathing difficulties.

## 2023-02-09 NOTE — CONSULT LETTER
[Dear  ___] : Dear  [unfilled], [Courtesy Letter:] : I had the pleasure of seeing your patient, [unfilled], in my office today. [Please see my note below.] : Please see my note below. [Sincerely,] : Sincerely, [FreeTextEntry2] : Dr. Brian Lozano [FreeTextEntry3] : Quynh Solis, PEPE, PA-C\par Sr. Physician Assistant, Otolaryngology at [employer]\par Adjunct Clinical Instructor, Department of Physician Assistant Studies, [employer]\par \par [employer]\par [address]\[place] [address]\par

## 2023-02-09 NOTE — PROCEDURE
[Image(s) Captured] : image(s) captured and filed [Globus] : globus [Topical Lidocaine] : topical lidocaine [Oxymetazoline HCl] : oxymetazoline HCl [Flexible Endoscope] : examined with the flexible endoscope [Serial Number: ___] : Serial Number: [unfilled] [] : ~M discharge bilaterally [Normal] : posterior cricoid area had healthy pink mucosa in the interarytenoid area and the esophageal inlet

## 2023-02-15 NOTE — PHYSICAL EXAM
[No Acute Distress] : no acute distress [Well Nourished] : well nourished [Well Developed] : well developed [No Lymphadenopathy] : no lymphadenopathy [Normal] : normal rate, regular rhythm, normal S1 and S2 and no murmur heard [de-identified] : mild phrygeal erythema without exudates [de-identified] : no palpable anterior or posterior lymphadenopathy

## 2023-02-15 NOTE — PLAN
[FreeTextEntry1] : Acute URI - improving\par Suggested OTC treatments as needed\par You have been diagnosed with an illness caused by a virus. Antibiotics do not cure viral\par infections. If given when not needed, antibiotics can be harmful. The treatments prescribed\par below will help you feel better while your body’s own defenses are fighting the virus.\par General instructions:\par Drink extra water and juice.\par Use a cool mist vaporizer or saline nasal spray to relieve congestion.\par For sore throats, use ice chips or sore throat spray; lozenges for older children and adults.\par For cough may try a teaspoon of honey or tea with honey and lemon\par For Nasal Congestion recommend daily use of Neti pot or sinus rinse\par Fever or aches: Tylenol or Motrin\par Ear pain:\par \par Use medicines according to the package instructions or as directed by your healthcare\par provider. Stop the medication when the symptoms get better.\par Follow up:\par For fever of 101 for 3 days or any fever of 103 or greater\par If symptoms worsen, are not improved in 7 days, if new symptoms occur, or if you have other concerns,\par please call or return to the office for a recheck.\par \par Patient instructed to return for new, persistent or increasing symptoms and verbalized understanding.\par

## 2023-02-15 NOTE — HISTORY OF PRESENT ILLNESS
[FreeTextEntry8] : OUSMANE ENGLAND is a 42 year old woman who presents with sore throat and fever for 2-3 days. She reports feeling well until Sunday night. She reports fever at home as high as 102° F and sore throat with only minimal relief from Ibuprofen 600 mg q 8. She is allergic to acetaminophen.\par Her child is in  and there was a report of strep at the school.\par Went to urgent care on Monday and Tuesday. \par Throat culture done 30 Jan 2023 was negative for strep

## 2023-03-15 ENCOUNTER — APPOINTMENT (OUTPATIENT)
Dept: OTOLARYNGOLOGY | Facility: CLINIC | Age: 43
End: 2023-03-15
Payer: COMMERCIAL

## 2023-03-15 VITALS
TEMPERATURE: 98.3 F | BODY MASS INDEX: 32.18 KG/M2 | DIASTOLIC BLOOD PRESSURE: 68 MMHG | HEART RATE: 71 BPM | SYSTOLIC BLOOD PRESSURE: 101 MMHG | WEIGHT: 205 LBS | HEIGHT: 67 IN | RESPIRATION RATE: 16 BRPM | OXYGEN SATURATION: 99 %

## 2023-03-15 DIAGNOSIS — K21.9 GASTRO-ESOPHAGEAL REFLUX DISEASE W/OUT ESOPHAGITIS: ICD-10-CM

## 2023-03-15 DIAGNOSIS — R13.10 DYSPHAGIA, UNSPECIFIED: ICD-10-CM

## 2023-03-15 PROCEDURE — 99213 OFFICE O/P EST LOW 20 MIN: CPT | Mod: 25

## 2023-03-15 PROCEDURE — 31575 DIAGNOSTIC LARYNGOSCOPY: CPT

## 2023-03-15 RX ORDER — OMEPRAZOLE 20 MG/1
20 CAPSULE, DELAYED RELEASE ORAL
Qty: 30 | Refills: 11 | Status: ACTIVE | COMMUNITY
Start: 2023-03-15 | End: 1900-01-01

## 2023-03-15 RX ORDER — LEVOFLOXACIN 750 MG/1
750 TABLET, FILM COATED ORAL DAILY
Qty: 7 | Refills: 2 | Status: COMPLETED | COMMUNITY
Start: 2023-02-03 | End: 2023-03-15

## 2023-03-15 NOTE — REASON FOR VISIT
[Subsequent Evaluation] : a subsequent evaluation for [FreeTextEntry2] : left neck/throat discomfort.

## 2023-03-15 NOTE — PROCEDURE
[Hoarseness] : hoarseness not clearly evaluated by indirect laryngoscopy [Globus] : globus [Topical Lidocaine] : topical lidocaine [Oxymetazoline HCl] : oxymetazoline HCl [Flexible Endoscope] : examined with the flexible endoscope [Serial Number: ___] : Serial Number: [unfilled] [] : ~M discharge bilaterally [Lesion(s)] : no lesions [Glottis Arytenoid Cartilages Erythema] : bilateral arytenoid ~M erythema [Normal] : posterior cricoid area had healthy pink mucosa in the interarytenoid area and the esophageal inlet

## 2023-03-15 NOTE — CONSULT LETTER
[Dear  ___] : Dear  [unfilled], [Courtesy Letter:] : I had the pleasure of seeing your patient, [unfilled], in my office today. [Please see my note below.] : Please see my note below. [Sincerely,] : Sincerely, [FreeTextEntry2] : Brian Lozano MD ([facility] Physician)\par  [FreeTextEntry3] : Quynh Solis, PEPE, PA-C\par Sr. Physician Assistant, Otolaryngology at [facility] [facility]\par Adjunct Clinical Instructor, Department of Physician Assistant Studies, [employer]\par \par [facility] [facility]\par [address]\[place] [address]\par

## 2023-03-15 NOTE — PHYSICAL EXAM
[Nasal Endoscopy Performed] : nasal endoscopy was performed, see procedure section for findings [] : septum deviated to the left [de-identified] : clear mucous secretions present [Midline] : trachea located in midline position [Normal] : no rashes

## 2023-03-15 NOTE — HISTORY OF PRESENT ILLNESS
[Painful Swallowing] : painful swallowing [Hoarseness] : hoarseness [de-identified] : Ms. ENGLAND is a 42 year female who presents reporting that she has left throat/neck discomfort.  She reported mild discomfort when swallowing but no difficulty swallowing.  She noted that this all began over the weekend.  She thought it was allergy related but noted that she has been taking Flonase and Zyrtec without any relief.  She noted that her voice was raspy yesterday but has since improved today.  She describes a "soreness" at the left base of her tongue region. [Otalgia] : no otalgia [Neck Mass] : no neck mass [Difficulty Swallowing] : no difficulty swallowing [Nausea] : no nausea [Weight Loss] : no weight loss

## 2023-04-03 ENCOUNTER — EMERGENCY (EMERGENCY)
Facility: HOSPITAL | Age: 43
LOS: 1 days | Discharge: HOME | End: 2023-04-03
Attending: EMERGENCY MEDICINE | Admitting: INTERNAL MEDICINE
Payer: COMMERCIAL

## 2023-04-03 VITALS
DIASTOLIC BLOOD PRESSURE: 66 MMHG | OXYGEN SATURATION: 98 % | HEART RATE: 74 BPM | RESPIRATION RATE: 16 BRPM | SYSTOLIC BLOOD PRESSURE: 98 MMHG

## 2023-04-03 VITALS
HEART RATE: 72 BPM | SYSTOLIC BLOOD PRESSURE: 118 MMHG | OXYGEN SATURATION: 98 % | DIASTOLIC BLOOD PRESSURE: 76 MMHG | WEIGHT: 199.96 LBS | TEMPERATURE: 97 F | HEIGHT: 67 IN

## 2023-04-03 DIAGNOSIS — Z90.711 ACQUIRED ABSENCE OF UTERUS WITH REMAINING CERVICAL STUMP: Chronic | ICD-10-CM

## 2023-04-03 DIAGNOSIS — M67.922 UNSPECIFIED DISORDER OF SYNOVIUM AND TENDON, LEFT UPPER ARM: Chronic | ICD-10-CM

## 2023-04-03 LAB
ALBUMIN SERPL ELPH-MCNC: 3.4 G/DL — SIGNIFICANT CHANGE UP (ref 3.3–5)
ALP SERPL-CCNC: 59 U/L — SIGNIFICANT CHANGE UP (ref 40–120)
ALT FLD-CCNC: 27 U/L — SIGNIFICANT CHANGE UP (ref 10–45)
ANION GAP SERPL CALC-SCNC: 4 MMOL/L — LOW (ref 5–17)
AST SERPL-CCNC: 18 U/L — SIGNIFICANT CHANGE UP (ref 10–40)
BASOPHILS # BLD AUTO: 0.05 K/UL — SIGNIFICANT CHANGE UP (ref 0–0.2)
BASOPHILS NFR BLD AUTO: 0.6 % — SIGNIFICANT CHANGE UP (ref 0–2)
BILIRUB SERPL-MCNC: 0.2 MG/DL — SIGNIFICANT CHANGE UP (ref 0.2–1.2)
BUN SERPL-MCNC: 13 MG/DL — SIGNIFICANT CHANGE UP (ref 7–23)
CALCIUM SERPL-MCNC: 8.5 MG/DL — SIGNIFICANT CHANGE UP (ref 8.4–10.5)
CHLORIDE SERPL-SCNC: 105 MMOL/L — SIGNIFICANT CHANGE UP (ref 96–108)
CO2 SERPL-SCNC: 31 MMOL/L — SIGNIFICANT CHANGE UP (ref 22–31)
CREAT SERPL-MCNC: 0.74 MG/DL — SIGNIFICANT CHANGE UP (ref 0.5–1.3)
EGFR: 104 ML/MIN/1.73M2 — SIGNIFICANT CHANGE UP
EOSINOPHIL # BLD AUTO: 0.16 K/UL — SIGNIFICANT CHANGE UP (ref 0–0.5)
EOSINOPHIL NFR BLD AUTO: 2 % — SIGNIFICANT CHANGE UP (ref 0–6)
GLUCOSE SERPL-MCNC: 93 MG/DL — SIGNIFICANT CHANGE UP (ref 70–99)
HCT VFR BLD CALC: 34.5 % — SIGNIFICANT CHANGE UP (ref 34.5–45)
HGB BLD-MCNC: 11.5 G/DL — SIGNIFICANT CHANGE UP (ref 11.5–15.5)
IMM GRANULOCYTES NFR BLD AUTO: 0.2 % — SIGNIFICANT CHANGE UP (ref 0–0.9)
LIDOCAIN IGE QN: 119 U/L — SIGNIFICANT CHANGE UP (ref 73–393)
LYMPHOCYTES # BLD AUTO: 2.21 K/UL — SIGNIFICANT CHANGE UP (ref 1–3.3)
LYMPHOCYTES # BLD AUTO: 27.5 % — SIGNIFICANT CHANGE UP (ref 13–44)
MCHC RBC-ENTMCNC: 29.7 PG — SIGNIFICANT CHANGE UP (ref 27–34)
MCHC RBC-ENTMCNC: 33.3 GM/DL — SIGNIFICANT CHANGE UP (ref 32–36)
MCV RBC AUTO: 89.1 FL — SIGNIFICANT CHANGE UP (ref 80–100)
MONOCYTES # BLD AUTO: 0.56 K/UL — SIGNIFICANT CHANGE UP (ref 0–0.9)
MONOCYTES NFR BLD AUTO: 7 % — SIGNIFICANT CHANGE UP (ref 2–14)
NEUTROPHILS # BLD AUTO: 5.05 K/UL — SIGNIFICANT CHANGE UP (ref 1.8–7.4)
NEUTROPHILS NFR BLD AUTO: 62.7 % — SIGNIFICANT CHANGE UP (ref 43–77)
NRBC # BLD: 0 /100 WBCS — SIGNIFICANT CHANGE UP (ref 0–0)
PLATELET # BLD AUTO: 260 K/UL — SIGNIFICANT CHANGE UP (ref 150–400)
POTASSIUM SERPL-MCNC: 3.8 MMOL/L — SIGNIFICANT CHANGE UP (ref 3.5–5.3)
POTASSIUM SERPL-SCNC: 3.8 MMOL/L — SIGNIFICANT CHANGE UP (ref 3.5–5.3)
PROT SERPL-MCNC: 7.2 G/DL — SIGNIFICANT CHANGE UP (ref 6–8.3)
RBC # BLD: 3.87 M/UL — SIGNIFICANT CHANGE UP (ref 3.8–5.2)
RBC # FLD: 12.9 % — SIGNIFICANT CHANGE UP (ref 10.3–14.5)
SODIUM SERPL-SCNC: 140 MMOL/L — SIGNIFICANT CHANGE UP (ref 135–145)
TROPONIN I, HIGH SENSITIVITY RESULT: <4 NG/L — SIGNIFICANT CHANGE UP
WBC # BLD: 8.05 K/UL — SIGNIFICANT CHANGE UP (ref 3.8–10.5)
WBC # FLD AUTO: 8.05 K/UL — SIGNIFICANT CHANGE UP (ref 3.8–10.5)

## 2023-04-03 PROCEDURE — 96365 THER/PROPH/DIAG IV INF INIT: CPT

## 2023-04-03 PROCEDURE — 85025 COMPLETE CBC W/AUTO DIFF WBC: CPT

## 2023-04-03 PROCEDURE — 99285 EMERGENCY DEPT VISIT HI MDM: CPT

## 2023-04-03 PROCEDURE — 80053 COMPREHEN METABOLIC PANEL: CPT

## 2023-04-03 PROCEDURE — 71045 X-RAY EXAM CHEST 1 VIEW: CPT | Mod: 26

## 2023-04-03 PROCEDURE — 99285 EMERGENCY DEPT VISIT HI MDM: CPT | Mod: 25

## 2023-04-03 PROCEDURE — 93005 ELECTROCARDIOGRAM TRACING: CPT

## 2023-04-03 PROCEDURE — 71045 X-RAY EXAM CHEST 1 VIEW: CPT

## 2023-04-03 PROCEDURE — 93010 ELECTROCARDIOGRAM REPORT: CPT

## 2023-04-03 PROCEDURE — 84484 ASSAY OF TROPONIN QUANT: CPT

## 2023-04-03 PROCEDURE — 83690 ASSAY OF LIPASE: CPT

## 2023-04-03 PROCEDURE — 36415 COLL VENOUS BLD VENIPUNCTURE: CPT

## 2023-04-03 RX ORDER — LIDOCAINE 4 G/100G
10 CREAM TOPICAL ONCE
Refills: 0 | Status: COMPLETED | OUTPATIENT
Start: 2023-04-03 | End: 2023-04-03

## 2023-04-03 RX ORDER — FAMOTIDINE 10 MG/ML
20 INJECTION INTRAVENOUS ONCE
Refills: 0 | Status: DISCONTINUED | OUTPATIENT
Start: 2023-04-03 | End: 2023-04-03

## 2023-04-03 RX ORDER — SODIUM CHLORIDE 9 MG/ML
1000 INJECTION INTRAMUSCULAR; INTRAVENOUS; SUBCUTANEOUS ONCE
Refills: 0 | Status: COMPLETED | OUTPATIENT
Start: 2023-04-03 | End: 2023-04-03

## 2023-04-03 RX ORDER — FAMOTIDINE 10 MG/ML
20 INJECTION INTRAVENOUS ONCE
Refills: 0 | Status: COMPLETED | OUTPATIENT
Start: 2023-04-03 | End: 2023-04-03

## 2023-04-03 RX ADMIN — Medication 30 MILLILITER(S): at 20:13

## 2023-04-03 RX ADMIN — SODIUM CHLORIDE 1000 MILLILITER(S): 9 INJECTION INTRAMUSCULAR; INTRAVENOUS; SUBCUTANEOUS at 20:13

## 2023-04-03 RX ADMIN — FAMOTIDINE 20 MILLIGRAM(S): 10 INJECTION INTRAVENOUS at 20:45

## 2023-04-03 RX ADMIN — FAMOTIDINE 100 MILLIGRAM(S): 10 INJECTION INTRAVENOUS at 20:13

## 2023-04-03 RX ADMIN — SODIUM CHLORIDE 1000 MILLILITER(S): 9 INJECTION INTRAMUSCULAR; INTRAVENOUS; SUBCUTANEOUS at 21:10

## 2023-04-03 NOTE — ED PROVIDER NOTE - NSFOLLOWUPINSTRUCTIONS_ED_ALL_ED_FT
-- You should update your primary care physician on your Emergency Department visit and follow up with them.  If you do not have a physician or have difficulty following up, please call: [phone] to obtain a [facility] doctor or specialist who can provide follow up.    -- Return to the ER for worsening or persistent symptoms, and/or ANY NEW OR CONCERNING SYMPTOMS.

## 2023-04-03 NOTE — ED ADULT NURSE NOTE - OBJECTIVE STATEMENT
Pt. presents from home c/o of mild chest discomfort that feels like "pressure not pain." Pt also states she feels nauseous and has a headache. Pt has been admitted in the past for GI issues but states this time it feels different.

## 2023-04-03 NOTE — ED PROVIDER NOTE - OBJECTIVE STATEMENT
Patient states that she was giving her daughter a bath and started having substernal chest pressure as if something was sitting on her chest it radiates from her epigastric area of the anterior chest.  Patient has no history of GERD but states that she just had Tabares's about an hour before the pain started.  Patient states the pain was episodic and still has a little bit discomfort.  Patient has no medical problems.

## 2023-04-03 NOTE — ED ADULT TRIAGE NOTE - CHIEF COMPLAINT QUOTE
Pt c/o of mild chest discomfort that feels like "pressure not pain." Pt also states she feels nauseous and has a headache.

## 2023-04-03 NOTE — ED PROVIDER NOTE - CLINICAL SUMMARY MEDICAL DECISION MAKING FREE TEXT BOX
Patient with chest pressure which is not related to ACS.  Possibly due to indigestion and recent dinner at [employer].  Patient feeling much better, stable for discharge.

## 2023-04-03 NOTE — ED PROVIDER NOTE - PATIENT PORTAL LINK FT
You can access the FollowMyHealth Patient Portal offered by [facility] by registering at the following website: [email or web address]. By joining [facility]’s FollowMyHealth portal, you will also be able to view your health information using other applications (apps) compatible with our system.

## 2023-04-17 ENCOUNTER — NON-APPOINTMENT (OUTPATIENT)
Age: 43
End: 2023-04-17

## 2023-04-19 ENCOUNTER — NON-APPOINTMENT (OUTPATIENT)
Age: 43
End: 2023-04-19

## 2023-10-10 ENCOUNTER — NON-APPOINTMENT (OUTPATIENT)
Age: 43
End: 2023-10-10

## 2023-10-16 RX ORDER — GUAIFENESIN AND CODEINE PHOSPHATE 10; 100 MG/5ML; MG/5ML
100-10 SOLUTION ORAL
Qty: 1 | Refills: 1 | Status: ACTIVE | COMMUNITY
Start: 2023-02-03 | End: 1900-01-01

## 2023-10-16 RX ORDER — ALBUTEROL SULFATE 90 UG/1
108 (90 BASE) INHALANT RESPIRATORY (INHALATION)
Qty: 1 | Refills: 3 | Status: ACTIVE | COMMUNITY
Start: 2023-10-16 | End: 1900-01-01

## 2024-02-01 ENCOUNTER — NON-APPOINTMENT (OUTPATIENT)
Age: 44
End: 2024-02-01

## 2024-02-26 NOTE — PHYSICAL EXAM
[Normal Gait] : normal gait [Currently Active] : currently active [Normal] : alert and oriented, normal memory [FreeTextEntry1] : 70 yo nopmp bleeding, + vag drynes, uses estrodiol. on a rare occ MANGO with a sneeze [de-identified] : young woman, obese, no acute distress [Mammogramdate] : 2023 [de-identified] : supple, no carotid bruits b/l [BreastSonogramDate] : 2023 [de-identified] : JVP ~ 7 cm H20, RRR, s1, s2, no murmurs/rubs [PapSmeardate] : 2023 [de-identified] : obese [BoneDensityDate] : 5/2023 [de-identified] : unlabored respirations, clear lung fields  [ColonoscopyDate] : 2021 [de-identified] : trace ankle edema b/l [TextBox_43] : 5 yrs

## 2024-03-03 ENCOUNTER — NON-APPOINTMENT (OUTPATIENT)
Age: 44
End: 2024-03-03

## 2024-03-08 ENCOUNTER — NON-APPOINTMENT (OUTPATIENT)
Age: 44
End: 2024-03-08

## 2024-03-10 NOTE — PROGRESS NOTE ADULT - SUBJECTIVE AND OBJECTIVE BOX
R3 OB Postpartum Note    Patient seen and examined at bedside.  Patient still without complaint, pain is controlled and patient denies lightheadedness, CP, SOB, and palpitations.  Patient has not been out of bed.  She is tolerating sips of liquid and is not yet passing flatus.  She has a benz catheter in place.  Bleeding is currently well-controlled    Physical exam:    Vital Signs Last 24 Hrs  T(C): 37.8 (11 Sep 2018 04:00), Max: 37.8 (11 Sep 2018 04:00)  T(F): 100 (11 Sep 2018 04:00), Max: 100 (11 Sep 2018 04:00)  HR: 116 (11 Sep 2018 04:15) (89 - 125)  BP: 97/66 (11 Sep 2018 04:15) (91/63 - 152/62)  BP(mean): 71 (11 Sep 2018 04:15) (59 - 95)  RR: 19 (11 Sep 2018 04:15) (13 - 24)  SpO2: 96% (11 Sep 2018 04:15) (95% - 100%)    09-09 @ 07:01  -  09-10 @ 07:00  --------------------------------------------------------  IN: 4000 mL / OUT: 0 mL / NET: 4000 mL    09-10 @ 07:01  -  09-11 @ 05:11  --------------------------------------------------------  IN: 5875 mL / OUT: 2620 mL / NET: 3255 mL        Gen: NAD  CV mild tachy, reg rhythym  Pulm bibasilar crackles, clear at apices   Abdomen: Soft, appropriate post-op tenderness, non- distended , firm uterine fundus at umbilicus.  Incision: Clean, dry, and intact with steri strips  Pelvic: Normal lochia noted  Ext: No calf tenderness    LABS:                        6.9    18.46 )-----------( 72       ( 11 Sep 2018 04:10 )             20.5                         8.1    22.70 )-----------( 101      ( 11 Sep 2018 00:45 )             24.6                         10.8   11.82 )-----------( 191      ( 10 Sep 2018 18:16 )             32.8     ABO Interpretation: A (09-10 @ 18:02)  Rh Interpretation: Negative (09-10 @ 18:02)  Antibody Screen: Negative (09-10 @ 18:02)    09-11-18 @ 04:10      134<L>  |  103  |  14  ----------------------------<  89  4.7   |  18<L>  |  1.31<H>    09-11-18 @ 01:20      136  |  102  |  12  ----------------------------<  93  4.2   |  17<L>  |  1.19    09-10-18 @ 18:16      134<L>  |  105  |  7   ----------------------------<  66<L>  4.0   |  19<L>  |  0.66        Ca    7.8<L>      11 Sep 2018 04:10  Ca    7.9<L>      11 Sep 2018 01:20  Ca    8.5      10 Sep 2018 18:16    TPro  4.6<L>  /  Alb  2.2<L>  /  TBili  2.3<H>  /  DBili  x   /  AST  42<H>  /  ALT  10  /  AlkPhos  77  09-11-18 @ 04:10  TPro  4.7<L>  /  Alb  2.4<L>  /  TBili  2.6<H>  /  DBili  x   /  AST  44<H>  /  ALT  10  /  AlkPhos  89  09-11-18 @ 01:20  TPro  6.2  /  Alb  2.7<L>  /  TBili  0.6  /  DBili  x   /  AST  19  /  ALT  8   /  AlkPhos  109  09-10-18 @ 18:16 yes

## 2024-04-01 NOTE — DISCHARGE NOTE OB - CARE PROVIDER_API CALL
Pre-application: Motor, sensory, and vascular responses intact in the injured extremity./Post-application: Motor, sensory, and vascular responses intact in the injured extremity./The patient/caregiver verbalized understanding of how to care for the injured extremity with splint
Christen Callejas (MD), Obstetrics and Gynecology  [facility] [place] [address]  Phone: [phone]  Fax: [phone]

## 2024-09-20 ENCOUNTER — NON-APPOINTMENT (OUTPATIENT)
Age: 44
End: 2024-09-20

## 2024-10-15 ENCOUNTER — APPOINTMENT (OUTPATIENT)
Dept: OBGYN | Facility: CLINIC | Age: 44
End: 2024-10-15

## 2024-10-15 VITALS
DIASTOLIC BLOOD PRESSURE: 60 MMHG | SYSTOLIC BLOOD PRESSURE: 92 MMHG | TEMPERATURE: 97.6 F | OXYGEN SATURATION: 98 % | WEIGHT: 180 LBS | HEART RATE: 68 BPM | BODY MASS INDEX: 28.25 KG/M2 | HEIGHT: 67 IN

## 2024-10-15 DIAGNOSIS — Z01.419 ENCOUNTER FOR GYNECOLOGICAL EXAMINATION (GENERAL) (ROUTINE) W/OUT ABNORMAL FINDINGS: ICD-10-CM

## 2024-10-15 DIAGNOSIS — R92.30 DENSE BREASTS, UNSPECIFIED: ICD-10-CM

## 2024-10-15 DIAGNOSIS — Z12.31 ENCOUNTER FOR SCREENING MAMMOGRAM FOR MALIGNANT NEOPLASM OF BREAST: ICD-10-CM

## 2024-10-15 DIAGNOSIS — Z11.51 ENCOUNTER FOR SCREENING FOR HUMAN PAPILLOMAVIRUS (HPV): ICD-10-CM

## 2024-10-15 DIAGNOSIS — Z11.3 ENCOUNTER FOR SCREENING FOR INFECTIONS WITH A PREDOMINANTLY SEXUAL MODE OF TRANSMISSION: ICD-10-CM

## 2024-10-15 PROCEDURE — 99396 PREV VISIT EST AGE 40-64: CPT

## 2024-10-16 LAB
HBV SURFACE AG SER QL: NONREACTIVE
HCV AB SER QL: NONREACTIVE
HCV S/CO RATIO: 0.16 S/CO
T PALLIDUM AB SER QL IA: NEGATIVE

## 2024-10-17 LAB
C TRACH RRNA SPEC QL NAA+PROBE: NOT DETECTED
CANDIDA VAG CYTO: NOT DETECTED
G VAGINALIS+PREV SP MTYP VAG QL MICRO: DETECTED
HIV1+2 AB SPEC QL IA.RAPID: NONREACTIVE
HPV HIGH+LOW RISK DNA PNL CVX: NOT DETECTED
N GONORRHOEA RRNA SPEC QL NAA+PROBE: NOT DETECTED
SOURCE TP AMPLIFICATION: NORMAL
T VAGINALIS VAG QL WET PREP: NOT DETECTED

## 2024-10-17 RX ORDER — METRONIDAZOLE 7.5 MG/G
0.75 GEL VAGINAL
Qty: 1 | Refills: 0 | Status: ACTIVE | COMMUNITY
Start: 2024-10-17 | End: 1900-01-01

## 2024-10-21 LAB — CYTOLOGY CVX/VAG DOC THIN PREP: ABNORMAL

## 2024-11-06 NOTE — PATIENT PROFILE ADULT. - ACCEPTABLE
Admitted with sodium of 122  Appreciate nephrology recommendations.   Sodium improved to 125  Added fluid restriction of 1.2  Consider changing fluids to 1.8%  IR consulted for paracentesis and thoracentesis   Continue to follow sodium closely    0

## 2024-11-21 NOTE — DISCHARGE NOTE ADULT - CARE PROVIDERS DIRECT ADDRESSES
Lov 8/19/2024     MOUNJARO 10MG/0.5ML INJ  
[email or web address]

## 2024-12-23 ENCOUNTER — NON-APPOINTMENT (OUTPATIENT)
Age: 44
End: 2024-12-23

## 2025-02-27 NOTE — ED PROVIDER NOTE - NSICDXPASTSURGICALHX_GEN_ALL_CORE_FT
Wound RN Consult Visit (20 minutes)    Reason for visit: pressure related wounds    Assessment: See Epic Wound Flowsheet.    Recommendations: Right Buttock  Wash with mild soap and water, rinse, pat dry.   Apply silicone bordered foam, may peel back for skin assessments and replace.   Change weekly and PRN by staff RN     Specialty interventions in place: Order placed for low air loss pump to be added to patient's mattress.     Plan discussed with bedside nurse Kenny WISE    This patient was seen by myself and Navi DELUNA         PAST SURGICAL HISTORY:   delivery delivered     S/P abdominal supracervical subtotal hysterectomy     Tendinopathy of left biceps tendon 2016

## 2025-03-04 ENCOUNTER — APPOINTMENT (OUTPATIENT)
Dept: OBGYN | Facility: CLINIC | Age: 45
End: 2025-03-04
Payer: COMMERCIAL

## 2025-03-04 VITALS
HEIGHT: 67 IN | BODY MASS INDEX: 29.03 KG/M2 | DIASTOLIC BLOOD PRESSURE: 70 MMHG | TEMPERATURE: 97.6 F | OXYGEN SATURATION: 98 % | HEART RATE: 78 BPM | WEIGHT: 185 LBS | SYSTOLIC BLOOD PRESSURE: 110 MMHG

## 2025-03-04 DIAGNOSIS — Z12.39 ENCOUNTER FOR OTHER SCREENING FOR MALIGNANT NEOPLASM OF BREAST: ICD-10-CM

## 2025-03-04 PROCEDURE — 99213 OFFICE O/P EST LOW 20 MIN: CPT

## 2025-03-04 PROCEDURE — 99459 PELVIC EXAMINATION: CPT

## 2025-03-05 LAB
HBV SURFACE AG SER QL: NONREACTIVE
HCV RNA SERPL NAA+PROBE-LOG IU: NOT DETECTED LOGIU/ML
HEPC RNA INTERP: NOT DETECTED
HIV1+2 AB SPEC QL IA.RAPID: NONREACTIVE

## 2025-03-10 LAB
A VAGINAE DNA VAG QL NAA+PROBE: NORMAL
BVAB2 DNA VAG QL NAA+PROBE: NORMAL
C KRUSEI DNA VAG QL NAA+PROBE: NEGATIVE
C TRACH RRNA SPEC QL NAA+PROBE: NEGATIVE
CANDIDA DNA VAG QL NAA+PROBE: NEGATIVE
MEGA1 DNA VAG QL NAA+PROBE: NORMAL
N GONORRHOEA RRNA SPEC QL NAA+PROBE: NEGATIVE
T PALLIDUM AB SER QL IA: NEGATIVE
T VAGINALIS RRNA SPEC QL NAA+PROBE: NEGATIVE

## 2025-04-02 PROBLEM — Z34.03 ENCOUNTER FOR PRENATAL CARE IN THIRD TRIMESTER OF FIRST PREGNANCY: Status: RESOLVED | Noted: 2018-08-13 | Resolved: 2025-04-02

## 2025-06-26 NOTE — ED PROVIDER NOTE - WR ORDER STATUS 1
Orders:    CBC; Future    lisinopril 40 mg tablet; Take 1 tablet (40 mg) by mouth once daily.     Resulted

## 2025-06-28 ENCOUNTER — NON-APPOINTMENT (OUTPATIENT)
Age: 45
End: 2025-06-28

## 2025-07-11 ENCOUNTER — APPOINTMENT (OUTPATIENT)
Dept: OBGYN | Facility: CLINIC | Age: 45
End: 2025-07-11
Payer: COMMERCIAL

## 2025-07-11 VITALS
HEART RATE: 59 BPM | HEIGHT: 67 IN | BODY MASS INDEX: 27.62 KG/M2 | DIASTOLIC BLOOD PRESSURE: 64 MMHG | TEMPERATURE: 97.1 F | OXYGEN SATURATION: 96 % | SYSTOLIC BLOOD PRESSURE: 108 MMHG | WEIGHT: 176 LBS

## 2025-07-11 PROCEDURE — 99213 OFFICE O/P EST LOW 20 MIN: CPT

## 2025-07-11 PROCEDURE — 99459 PELVIC EXAMINATION: CPT

## 2025-07-14 LAB
HBV SURFACE AG SER QL: NONREACTIVE
HCV RNA SERPL NAA DL=5-ACNC: NOT DETECTED IU/ML
HCV RNA SERPL NAA+PROBE-LOG IU: NOT DETECTED LOGIU/ML
HEPC RNA INTERP: NOT DETECTED
HIV1+2 AB SPEC QL IA.RAPID: NONREACTIVE
T PALLIDUM AB SER QL IA: NEGATIVE

## 2025-07-17 LAB
HSV 1 IGG TYPE-SPECIFIC AB: 7.27 INDEX
HSV 2 IGG TYPE-SPECIFIC AB: <0.9 INDEX

## 2025-07-28 ENCOUNTER — NON-APPOINTMENT (OUTPATIENT)
Age: 45
End: 2025-07-28

## 2025-08-31 ENCOUNTER — NON-APPOINTMENT (OUTPATIENT)
Age: 45
End: 2025-08-31